# Patient Record
Sex: MALE | Race: WHITE | Employment: OTHER | URBAN - METROPOLITAN AREA
[De-identification: names, ages, dates, MRNs, and addresses within clinical notes are randomized per-mention and may not be internally consistent; named-entity substitution may affect disease eponyms.]

---

## 2017-05-01 PROBLEM — Z91.199 NONCOMPLIANCE: Status: ACTIVE | Noted: 2017-05-01

## 2017-05-01 PROBLEM — I10 ESSENTIAL HYPERTENSION: Status: ACTIVE | Noted: 2017-05-01

## 2017-12-05 PROBLEM — E66.01 MORBID OBESITY (HCC): Status: ACTIVE | Noted: 2017-12-05

## 2017-12-13 PROBLEM — E66.9 OBESITY (BMI 35.0-39.9 WITHOUT COMORBIDITY): Status: ACTIVE | Noted: 2017-12-13

## 2019-04-22 ENCOUNTER — HOSPITAL ENCOUNTER (OUTPATIENT)
Dept: GENERAL RADIOLOGY | Age: 64
Discharge: HOME | End: 2019-04-22
Payer: MEDICARE

## 2019-04-22 DIAGNOSIS — G89.29 CHRONIC BILATERAL LOW BACK PAIN WITH SCIATICA, SCIATICA LATERALITY UNSPECIFIED: ICD-10-CM

## 2019-04-22 DIAGNOSIS — M54.40 CHRONIC BILATERAL LOW BACK PAIN WITH SCIATICA, SCIATICA LATERALITY UNSPECIFIED: ICD-10-CM

## 2019-04-22 PROCEDURE — 72100 X-RAY EXAM L-S SPINE 2/3 VWS: CPT

## 2019-04-22 NOTE — PROGRESS NOTES
He has very severe arthritis in the lower back.   Make sure he gets an appointment with Dr. Michelle Damon at [facility] and I would like to see him in 2 weeks

## 2020-01-28 PROBLEM — D75.1 ERYTHROCYTOSIS: Status: ACTIVE | Noted: 2020-01-28

## 2020-02-25 ENCOUNTER — HOSPITAL ENCOUNTER (OUTPATIENT)
Dept: LAB | Age: 65
Discharge: HOME | End: 2020-02-25
Payer: MEDICARE

## 2020-02-25 DIAGNOSIS — D58.2 ELEVATED HEMOGLOBIN (HCC): ICD-10-CM

## 2020-02-25 LAB
ALBUMIN SERPL-MCNC: 3.5 G/DL (ref 3.2–4.6)
ALBUMIN/GLOB SERPL: 0.8 {RATIO} (ref 1.2–3.5)
ALP SERPL-CCNC: 68 U/L (ref 50–136)
ALT SERPL-CCNC: 30 U/L (ref 12–65)
ANION GAP SERPL CALC-SCNC: 4 MMOL/L (ref 7–16)
AST SERPL-CCNC: 26 U/L (ref 15–37)
BASOPHILS # BLD: 0 K/UL (ref 0–0.2)
BASOPHILS NFR BLD: 0 % (ref 0–2)
BILIRUB SERPL-MCNC: 0.4 MG/DL (ref 0.2–1.1)
BUN SERPL-MCNC: 22 MG/DL (ref 8–23)
CALCIUM SERPL-MCNC: 10.3 MG/DL (ref 8.3–10.4)
CHLORIDE SERPL-SCNC: 106 MMOL/L (ref 98–107)
CO2 SERPL-SCNC: 29 MMOL/L (ref 21–32)
CREAT SERPL-MCNC: 1.21 MG/DL (ref 0.8–1.5)
DIFFERENTIAL METHOD BLD: ABNORMAL
EOSINOPHIL # BLD: 0.3 K/UL (ref 0–0.8)
EOSINOPHIL NFR BLD: 4 % (ref 0.5–7.8)
ERYTHROCYTE [DISTWIDTH] IN BLOOD BY AUTOMATED COUNT: 13 % (ref 11.9–14.6)
GLOBULIN SER CALC-MCNC: 4.5 G/DL (ref 2.3–3.5)
GLUCOSE SERPL-MCNC: 97 MG/DL (ref 65–100)
HCT VFR BLD AUTO: 52 % (ref 41.1–50.3)
HGB BLD-MCNC: 17.1 G/DL (ref 13.6–17.2)
IMM GRANULOCYTES # BLD AUTO: 0 K/UL (ref 0–0.5)
IMM GRANULOCYTES NFR BLD AUTO: 0 % (ref 0–5)
LYMPHOCYTES # BLD: 2.2 K/UL (ref 0.5–4.6)
LYMPHOCYTES NFR BLD: 30 % (ref 13–44)
MCH RBC QN AUTO: 30.2 PG (ref 26.1–32.9)
MCHC RBC AUTO-ENTMCNC: 32.9 G/DL (ref 31.4–35)
MCV RBC AUTO: 91.9 FL (ref 79.6–97.8)
MONOCYTES # BLD: 0.7 K/UL (ref 0.1–1.3)
MONOCYTES NFR BLD: 9 % (ref 4–12)
NEUTS SEG # BLD: 4.2 K/UL (ref 1.7–8.2)
NEUTS SEG NFR BLD: 56 % (ref 43–78)
NRBC # BLD: 0 K/UL (ref 0–0.2)
PLATELET # BLD AUTO: 185 K/UL (ref 150–450)
PMV BLD AUTO: 9.3 FL (ref 9.4–12.3)
POTASSIUM SERPL-SCNC: 5.8 MMOL/L (ref 3.5–5.1)
PROT SERPL-MCNC: 8 G/DL (ref 6.3–8.2)
RBC # BLD AUTO: 5.66 M/UL (ref 4.23–5.67)
SODIUM SERPL-SCNC: 139 MMOL/L (ref 136–145)
WBC # BLD AUTO: 7.5 K/UL (ref 4.3–11.1)

## 2020-02-25 PROCEDURE — 85025 COMPLETE CBC W/AUTO DIFF WBC: CPT

## 2020-02-25 PROCEDURE — 80053 COMPREHEN METABOLIC PANEL: CPT

## 2020-02-25 PROCEDURE — 84403 ASSAY OF TOTAL TESTOSTERONE: CPT

## 2020-02-25 PROCEDURE — 36415 COLL VENOUS BLD VENIPUNCTURE: CPT

## 2020-02-27 LAB
TESTOST FREE SERPL-MCNC: 1.3 PG/ML (ref 6.6–18.1)
TESTOST SERPL-MCNC: <3 NG/DL (ref 264–916)

## 2021-06-11 PROBLEM — I73.9 PAD (PERIPHERAL ARTERY DISEASE) (HCC): Status: ACTIVE | Noted: 2021-06-11

## 2021-11-08 PROBLEM — I65.23 ATHEROSCLEROSIS OF BOTH CAROTID ARTERIES: Status: ACTIVE | Noted: 2021-11-08

## 2022-03-18 PROBLEM — I73.9 PAD (PERIPHERAL ARTERY DISEASE) (HCC): Status: ACTIVE | Noted: 2021-06-11

## 2022-03-18 PROBLEM — I65.23 ATHEROSCLEROSIS OF BOTH CAROTID ARTERIES: Status: ACTIVE | Noted: 2021-11-08

## 2022-03-18 PROBLEM — E66.9 OBESITY (BMI 35.0-39.9 WITHOUT COMORBIDITY): Status: ACTIVE | Noted: 2017-12-13

## 2022-03-18 PROBLEM — I10 ESSENTIAL HYPERTENSION: Status: ACTIVE | Noted: 2017-05-01

## 2022-03-18 PROBLEM — Z91.199 NONCOMPLIANCE: Status: ACTIVE | Noted: 2017-05-01

## 2022-03-19 PROBLEM — E66.01 MORBID OBESITY (HCC): Status: ACTIVE | Noted: 2017-12-05

## 2022-03-19 PROBLEM — D75.1 ERYTHROCYTOSIS: Status: ACTIVE | Noted: 2020-01-28

## 2022-06-13 ENCOUNTER — NURSE ONLY (OUTPATIENT)
Dept: UROLOGY | Age: 67
End: 2022-06-13
Payer: MEDICARE

## 2022-06-13 DIAGNOSIS — E29.1 HYPOGONADISM IN MALE: Primary | ICD-10-CM

## 2022-06-13 PROCEDURE — 96372 THER/PROPH/DIAG INJ SC/IM: CPT | Performed by: UROLOGY

## 2022-06-13 RX ORDER — TESTOSTERONE CYPIONATE 200 MG/ML
100 INJECTION INTRAMUSCULAR ONCE
Status: COMPLETED | OUTPATIENT
Start: 2022-06-13 | End: 2022-06-13

## 2022-06-13 RX ADMIN — TESTOSTERONE CYPIONATE 100 MG: 200 INJECTION INTRAMUSCULAR at 10:23

## 2022-06-13 NOTE — PROGRESS NOTES
Per 's note from 08/18/2021, and his verbal consent as in office clinician, patient due to receive 100mg (0.5cc) testosterone injection every 2 weeks. Patient received injection Rangel Engel today without incident. Testosterone injections authorized by insurance in schedule note by Ayse Donnelly.  Per that note: No PA Req / per [employer]ail[employer] / Rozanna Cornea

## 2022-06-27 ENCOUNTER — NURSE ONLY (OUTPATIENT)
Dept: UROLOGY | Age: 67
End: 2022-06-27
Payer: MEDICARE

## 2022-06-27 DIAGNOSIS — E29.1 HYPOGONADISM IN MALE: Primary | ICD-10-CM

## 2022-06-27 PROCEDURE — 96372 THER/PROPH/DIAG INJ SC/IM: CPT | Performed by: UROLOGY

## 2022-06-27 RX ORDER — TESTOSTERONE CYPIONATE 200 MG/ML
100 INJECTION INTRAMUSCULAR ONCE
Status: COMPLETED | OUTPATIENT
Start: 2022-06-27 | End: 2022-06-27

## 2022-06-27 RX ADMIN — TESTOSTERONE CYPIONATE 100 MG: 200 INJECTION INTRAMUSCULAR at 11:35

## 2022-06-27 NOTE — PROGRESS NOTES
Per 's note from 08/18/2021, and his verbal consent as in office clinician, patient due to receive 100mg (0.5cc) testosterone injection every 2 weeks. Patient received injection LUOQ today without incident. Testosterone injections authorized by insurance in schedule note by Trine Lefort.  Per that note: No PA Req / per [employer] / Aileen Bradley

## 2022-07-01 ENCOUNTER — TELEPHONE (OUTPATIENT)
Dept: ORTHOPEDIC SURGERY | Age: 67
End: 2022-07-01

## 2022-07-11 ENCOUNTER — NURSE ONLY (OUTPATIENT)
Dept: UROLOGY | Age: 67
End: 2022-07-11
Payer: MEDICARE

## 2022-07-11 DIAGNOSIS — E29.1 HYPOGONADISM IN MALE: Primary | ICD-10-CM

## 2022-07-11 PROCEDURE — 96372 THER/PROPH/DIAG INJ SC/IM: CPT | Performed by: UROLOGY

## 2022-07-11 RX ORDER — TESTOSTERONE CYPIONATE 200 MG/ML
100 INJECTION INTRAMUSCULAR ONCE
Status: COMPLETED | OUTPATIENT
Start: 2022-07-11 | End: 2022-07-11

## 2022-07-11 RX ADMIN — TESTOSTERONE CYPIONATE 100 MG: 200 INJECTION INTRAMUSCULAR at 10:47

## 2022-07-11 NOTE — PROGRESS NOTES
Per 's note from 08/18/2021, and 's verbal consent as in office clinician, patient due to receive 100mg (0.5cc) testosterone injection every 2 weeks. Patient received injection Natividad Robin today without incident. Testosterone injections authorized by insurance in schedule note by Reggie Mcdonald.  Per that note: No EBENEZER Agudelo / per Availchristiano / Bruce Gonzales

## 2022-07-13 ENCOUNTER — TELEPHONE (OUTPATIENT)
Dept: ORTHOPEDIC SURGERY | Age: 67
End: 2022-07-13

## 2022-07-13 DIAGNOSIS — M54.17 LUMBOSACRAL RADICULOPATHY: ICD-10-CM

## 2022-07-13 DIAGNOSIS — M47.816 LUMBAR SPONDYLOSIS: Primary | ICD-10-CM

## 2022-07-13 NOTE — LETTER
[facility] [address] [address]  Phone: [phone]  Fax: [phone]    Vick Cifuentes MD        2022      Lumbar Spine Injection Precert Authorization Form    Name: Danyel Lorenzo  : 1955  Age: 75 y.o. Gender: male    Clinical Information    Referring Doctor: Josiah Adams MD  Diagnosis:     ICD-10-CM    1. Lumbar spondylosis  M47.816    2. Lumbosacral radiculopathy  M54.17    . Body Part: lumbar spine    Type of Service    [ ] 74410+- EMMANUEL Caudal or Lumbar    [ ] 58316- Facet Lumbar (single Level)    [ ] 51993- Facet 2nd Level    [ ] 13480- Facet 3 or more level    [ ] 91141- Sacroiliac joint     [ ] 95875- SNRB Transforaminal EMMANUEL Lumbar or Sacral (single level)    [ ] 31149- SNRB add levels Lumbar or Sacral     [ ] 64806- Sciatic Nerve, single.      [ ] 56712- Radiofrequency L/S single facet     [ ] 45187- Radiofrequency L/s additional facet       Comments  Left L4 SNRB    Insurance:        Sincerely,        Vick Cifuentes MD

## 2022-07-14 NOTE — TELEPHONE ENCOUNTER
Patient scheduled for repeat injections with Dr. Parada Look 7/21 GR 3:00p
Pt called to schedule back injection
Afib    BPH (benign prostatic hyperplasia)    Diabetes    Diabetes mellitus with no complication    History of non-ST elevation myocardial infarction (NSTEMI)    Hypertension    Osteomyelitis  s/p debridement  Perforated gastric ulcer  s/p emergent ex-lap omentopexy and plication 6/2019  Pulmonary embolism

## 2022-07-21 ENCOUNTER — OFFICE VISIT (OUTPATIENT)
Dept: ORTHOPEDIC SURGERY | Age: 67
End: 2022-07-21
Payer: MEDICARE

## 2022-07-21 DIAGNOSIS — M54.17 LUMBOSACRAL RADICULOPATHY: Primary | ICD-10-CM

## 2022-07-21 DIAGNOSIS — M54.50 LOW BACK PAIN, UNSPECIFIED BACK PAIN LATERALITY, UNSPECIFIED CHRONICITY, UNSPECIFIED WHETHER SCIATICA PRESENT: ICD-10-CM

## 2022-07-21 PROCEDURE — 64483 NJX AA&/STRD TFRM EPI L/S 1: CPT | Performed by: PHYSICAL MEDICINE & REHABILITATION

## 2022-07-21 PROCEDURE — 20552 NJX 1/MLT TRIGGER POINT 1/2: CPT | Performed by: PHYSICAL MEDICINE & REHABILITATION

## 2022-07-21 RX ORDER — TRIAMCINOLONE ACETONIDE 40 MG/ML
160 INJECTION, SUSPENSION INTRA-ARTICULAR; INTRAMUSCULAR ONCE
Status: COMPLETED | OUTPATIENT
Start: 2022-07-21 | End: 2022-07-21

## 2022-07-21 RX ADMIN — TRIAMCINOLONE ACETONIDE 160 MG: 40 INJECTION, SUSPENSION INTRA-ARTICULAR; INTRAMUSCULAR at 15:14

## 2022-07-21 NOTE — PROGRESS NOTES
Date: 07/21/22   Name: Abel Brooke    Pre-Procedural Diagnosis:    Diagnosis Orders   1. Lumbosacral radiculopathy  FL NERVE BLOCK LUMBOSACRAL 1ST    triamcinolone acetonide (KENALOG-40) injection 160 mg      2. Low back pain, unspecified back pain laterality, unspecified chronicity, unspecified whether sciatica present  INJECT TRIGGER POINT, 1 OR 2    triamcinolone acetonide (KENALOG-40) injection 160 mg          Procedure: Selective Nerve Root Blocks (Transforaminal) - Single Level with lumbar trigger point injections    Precautions: [facility] Precautions spine injections: None. Patient denies any prior sensitivity to steroid, local anesthetic, contrast dye, iodine or shellfish. The procedure was discussed at length with the patient and informed consent was signed. The patient was placed in a prone position on the fluoroscopy table and the skin was prepped and draped in a routine sterile fashion. The areas to be injected was/were anesthetized with approximately 5 cc of 1% Lidocaine. A 22-gauge 3.5 inch inch spinal needle was carefully advanced under fluoroscopic guidance to the left L4 transforaminal space(s). At this time 0.25 cc of omnipaque administered. Once proper placement was confirmed, 2 cc of 0.25% Marcaine and 80 mg of Kenalog were injected through the spinal needle at that/each site. After informed oral consent, patient was prepped per routine. The left lower lumbar paraspinal area(s) was injected. 80 mg of Kenalog with 1 cc of 0.25% Marcaine injected at each site. Patient tolerated well. Band aid(s) applied. Fluoroscopic guidance was used intermittently over a 10-minute period to insure proper needle placement and patient safety. A hard copy of the fluoroscopic  images has been placed in the patient's chart. The patient was monitored after the procedure and discharged home in stable fashion.      Resume Meds:  N/A  Kirstin Kelly MD  07/21/22

## 2022-07-25 ENCOUNTER — NURSE ONLY (OUTPATIENT)
Dept: UROLOGY | Age: 67
End: 2022-07-25
Payer: MEDICARE

## 2022-07-25 DIAGNOSIS — E29.1 HYPOGONADISM IN MALE: Primary | ICD-10-CM

## 2022-07-25 PROCEDURE — 96372 THER/PROPH/DIAG INJ SC/IM: CPT | Performed by: UROLOGY

## 2022-07-25 RX ORDER — TESTOSTERONE CYPIONATE 200 MG/ML
100 INJECTION INTRAMUSCULAR ONCE
Status: COMPLETED | OUTPATIENT
Start: 2022-07-25 | End: 2022-07-25

## 2022-07-25 RX ADMIN — TESTOSTERONE CYPIONATE 100 MG: 200 INJECTION INTRAMUSCULAR at 11:39

## 2022-07-25 NOTE — PROGRESS NOTES
Per 's note from 08/18/2021, and 's verbal consent as in office clinician, patient due to receive 100mg (0.5cc) testosterone injection every 2 weeks. Patient received injection Jc Shah today without incident. Testosterone injections authorized by insurance in schedule note by Bennett New.  Per that note: No PA Req / per [employer]aility / Néstor Weiner

## 2022-08-10 ENCOUNTER — NURSE ONLY (OUTPATIENT)
Dept: UROLOGY | Age: 67
End: 2022-08-10

## 2022-08-10 DIAGNOSIS — N13.8 BENIGN PROSTATIC HYPERPLASIA WITH URINARY OBSTRUCTION: ICD-10-CM

## 2022-08-10 DIAGNOSIS — N40.1 BENIGN PROSTATIC HYPERPLASIA WITH URINARY OBSTRUCTION: ICD-10-CM

## 2022-08-10 DIAGNOSIS — N13.8 BENIGN PROSTATIC HYPERPLASIA WITH URINARY OBSTRUCTION: Primary | ICD-10-CM

## 2022-08-10 DIAGNOSIS — N40.1 BENIGN PROSTATIC HYPERPLASIA WITH URINARY OBSTRUCTION: Primary | ICD-10-CM

## 2022-08-10 LAB — PSA SERPL-MCNC: 0.7 NG/ML

## 2022-08-17 ENCOUNTER — OFFICE VISIT (OUTPATIENT)
Dept: UROLOGY | Age: 67
End: 2022-08-17
Payer: MEDICARE

## 2022-08-17 DIAGNOSIS — N20.0 KIDNEY STONE: ICD-10-CM

## 2022-08-17 DIAGNOSIS — N13.8 BENIGN PROSTATIC HYPERPLASIA WITH URINARY OBSTRUCTION: ICD-10-CM

## 2022-08-17 DIAGNOSIS — E29.1 HYPOGONADISM IN MALE: Primary | ICD-10-CM

## 2022-08-17 DIAGNOSIS — N40.1 BENIGN PROSTATIC HYPERPLASIA WITH URINARY OBSTRUCTION: ICD-10-CM

## 2022-08-17 LAB
ALBUMIN SERPL-MCNC: 3.6 G/DL (ref 3.2–4.6)
ALBUMIN/GLOB SERPL: 0.9 {RATIO} (ref 1.2–3.5)
ALP SERPL-CCNC: 80 U/L (ref 50–136)
ALT SERPL-CCNC: 27 U/L (ref 12–65)
AST SERPL-CCNC: 26 U/L (ref 15–37)
BASOPHILS # BLD: 0 K/UL (ref 0–0.2)
BASOPHILS NFR BLD: 1 % (ref 0–2)
BILIRUB DIRECT SERPL-MCNC: 0.2 MG/DL
BILIRUB SERPL-MCNC: 0.5 MG/DL (ref 0.2–1.1)
BILIRUBIN, URINE, POC: NEGATIVE
BLOOD URINE, POC: NORMAL
DIFFERENTIAL METHOD BLD: ABNORMAL
EOSINOPHIL # BLD: 0.2 K/UL (ref 0–0.8)
EOSINOPHIL NFR BLD: 2 % (ref 0.5–7.8)
ERYTHROCYTE [DISTWIDTH] IN BLOOD BY AUTOMATED COUNT: 14 % (ref 11.9–14.6)
GLOBULIN SER CALC-MCNC: 3.8 G/DL (ref 2.3–3.5)
GLUCOSE URINE, POC: NEGATIVE
HCT VFR BLD AUTO: 55.1 % (ref 41.1–50.3)
HGB BLD-MCNC: 18.2 G/DL (ref 13.6–17.2)
IMM GRANULOCYTES # BLD AUTO: 0 K/UL (ref 0–0.5)
IMM GRANULOCYTES NFR BLD AUTO: 1 % (ref 0–5)
KETONES, URINE, POC: NEGATIVE
LEUKOCYTE ESTERASE, URINE, POC: NEGATIVE
LYMPHOCYTES # BLD: 2 K/UL (ref 0.5–4.6)
LYMPHOCYTES NFR BLD: 24 % (ref 13–44)
MCH RBC QN AUTO: 31.8 PG (ref 26.1–32.9)
MCHC RBC AUTO-ENTMCNC: 33 G/DL (ref 31.4–35)
MCV RBC AUTO: 96.3 FL (ref 79.6–97.8)
MONOCYTES # BLD: 0.6 K/UL (ref 0.1–1.3)
MONOCYTES NFR BLD: 7 % (ref 4–12)
NEUTS SEG # BLD: 5.7 K/UL (ref 1.7–8.2)
NEUTS SEG NFR BLD: 65 % (ref 43–78)
NITRITE, URINE, POC: NEGATIVE
NRBC # BLD: 0 K/UL (ref 0–0.2)
PH, URINE, POC: 6 (ref 4.6–8)
PLATELET # BLD AUTO: 193 K/UL (ref 150–450)
PMV BLD AUTO: 10.2 FL (ref 9.4–12.3)
PROT SERPL-MCNC: 7.4 G/DL (ref 6.3–8.2)
PROTEIN,URINE, POC: NEGATIVE
RBC # BLD AUTO: 5.72 M/UL (ref 4.23–5.6)
SPECIFIC GRAVITY, URINE, POC: 1.02 (ref 1–1.03)
URINALYSIS CLARITY, POC: NORMAL
URINALYSIS COLOR, POC: NORMAL
UROBILINOGEN, POC: NORMAL
WBC # BLD AUTO: 8.6 K/UL (ref 4.3–11.1)

## 2022-08-17 PROCEDURE — 81003 URINALYSIS AUTO W/O SCOPE: CPT | Performed by: UROLOGY

## 2022-08-17 PROCEDURE — 1123F ACP DISCUSS/DSCN MKR DOCD: CPT | Performed by: UROLOGY

## 2022-08-17 PROCEDURE — G8427 DOCREV CUR MEDS BY ELIG CLIN: HCPCS | Performed by: UROLOGY

## 2022-08-17 PROCEDURE — G8417 CALC BMI ABV UP PARAM F/U: HCPCS | Performed by: UROLOGY

## 2022-08-17 PROCEDURE — 4004F PT TOBACCO SCREEN RCVD TLK: CPT | Performed by: UROLOGY

## 2022-08-17 PROCEDURE — 3017F COLORECTAL CA SCREEN DOC REV: CPT | Performed by: UROLOGY

## 2022-08-17 PROCEDURE — 99214 OFFICE O/P EST MOD 30 MIN: CPT | Performed by: UROLOGY

## 2022-08-17 RX ORDER — TESTOSTERONE CYPIONATE 200 MG/ML
100 VIAL (ML) INTRAMUSCULAR
Qty: 5 ML | Refills: 5 | Status: SHIPPED | OUTPATIENT
Start: 2022-08-17

## 2022-08-17 ASSESSMENT — ENCOUNTER SYMPTOMS
BLOOD IN STOOL: 0
EYE PAIN: 0
EYE DISCHARGE: 0
DIARRHEA: 0
WHEEZING: 0
NAUSEA: 0
SHORTNESS OF BREATH: 0
HEARTBURN: 0
SKIN LESIONS: 0
CONSTIPATION: 0
BACK PAIN: 0
INDIGESTION: 0
ABDOMINAL PAIN: 0
VOMITING: 0
COUGH: 0

## 2022-08-17 NOTE — PROGRESS NOTES
[facility]  [address] [facility] [address], [address] [facility]  [phone]          Subha Holland  : 1955    Chief Complaint   Patient presents with    Follow-up          HPI     Subha Holland is a 75 y.o.  male with symptomatic hypogonadism who presents to clinic for follow up. He previously followed with NP Krissy Stephenson and was on testosterone 200 mg IM q10 days. His last level on 20 was > 1500 with a Hb 18. 3. He was sent to Dr. Lucita Taylor with Hematology who agreed that it was likely due to his very elevated T levels. TRT was stopped and levels normalized. He was then restarted on 200 mg IM TRT q14 days and gets his injections at our office. Hct was again elevated and dose decreased to 100 mg q14 days. Total testosterone and Hct due today. Denies significant urgency, frequency, urinary retention, hematuria, urinary incontinence or other symptoms/concerns. No bothersome fatigue/low libido. States he feels great over all. Of note, he does report ED and is not on any medication at this time for it. Does not want ED medication today. He also reports passing a kidney stone a few weeks back without difficulty.      Lab Results   Component Value Date    PSA 0.7 08/10/2022    PSA 1.2 2021    PSA 0.7 2021           Past Medical History:   Diagnosis Date    Arthritis     Asthma     Calculus of kidney     Hypercholesterolemia     Hypertension      Past Surgical History:   Procedure Laterality Date    HEENT Bilateral     cataracts    ORTHOPEDIC SURGERY      rotator cuff ten years ago     Current Outpatient Medications   Medication Sig Dispense Refill    Testosterone Cypionate 200 MG/ML SOLN Inject 100 mg as directed every 14 days 5 mL 5    aspirin 81 MG EC tablet Take 81 mg by mouth daily      atorvastatin (LIPITOR) 80 MG tablet TAKE 1 TABLET EVERY NIGHT      diazePAM (VALIUM) 5 MG tablet Take 5 mg by mouth.      ezetimibe (ZETIA) 10 MG tablet Take 10 mg by mouth daily      lisinopril (PRINIVIL;ZESTRIL) 20 MG tablet TAKE 1 TABLET EVERY DAY      naproxen sodium (ANAPROX) 220 MG tablet Take 220 mg by mouth daily      rivaroxaban (XARELTO) 2.5 MG TABS tablet Take 2.5 mg by mouth 2 times daily (with meals)      traMADol (ULTRAM) 50 MG tablet Take 50 mg by mouth every 6 hours as needed. No current facility-administered medications for this visit. Allergies   Allergen Reactions    Methocarbamol Hives    Ciprofloxacin Nausea And Vomiting     Social History     Socioeconomic History    Marital status:      Spouse name: Not on file    Number of children: Not on file    Years of education: Not on file    Highest education level: Not on file   Occupational History    Not on file   Tobacco Use    Smoking status: Some Days     Packs/day: 1.00     Types: Cigarettes     Last attempt to quit: 2013     Years since quittin.6    Smokeless tobacco: Never   Substance and Sexual Activity    Alcohol use: Yes     Alcohol/week: 0.0 standard drinks    Drug use: No    Sexual activity: Not on file   Other Topics Concern    Not on file   Social History Narrative    Not on file     Social Determinants of Health     Financial Resource Strain: Not on file   Food Insecurity: Not on file   Transportation Needs: Not on file   Physical Activity: Not on file   Stress: Not on file   Social Connections: Not on file   Intimate Partner Violence: Not on file   Housing Stability: Not on file     Family History   Problem Relation Age of Onset    Asthma Mother     Thyroid Disease Mother     COPD Mother     Heart Disease Father     Hypertension Father        Review of Systems  Constitutional:   Negative for fever, chills, appetite change, malaise/fatigue, headaches and weight loss. Skin:  Negative for skin lesions, rash and itching. Eyes:  Negative for visual disturbance, eye pain and eye discharge.   ENT:  Negative for difficulty articulating words, pain swallowing, high frequency hearing loss and dry mouth. Respiratory:  Negative for cough, blood in sputum, shortness of breath and wheezing. Cardiovascular:  Negative for chest pain, hypertension, irregular heartbeat, leg pain, leg swelling, regular rate and rhythm and varicose veins. GI:  Negative for nausea, vomiting, abdominal pain, blood in stool, constipation, diarrhea, indigestion and heartburn. Genitourinary:  Negative for urinary burning, hematuria, flank pain, recurrent UTIs, history of urolithiasis, nocturia, slower stream, straining, urgency, leakage w/ urge, frequent urination, incomplete emptying, erectile dysfunction, testicular pain, sexually transmitted disease, discharge and urethral stricture. Musculoskeletal:  Negative for back pain, bone pain, arthralgias, tenderness, muscle weakness and neck pain. Neurological:  Negative for dizziness, focal weakness, numbness, seizures and tremors. Psychological:  Negative for depression and psychiatric problem. Endocrine:  Negative for cold intolerance, thirst, excessive urination, fatigue and heat intolerance. Hem/Lymphatic:  Negative for easy bleeding, easy bruising and frequent infections. Urinalysis  UA - Dipstick  Results for orders placed or performed in visit on 08/17/22   AMB POC URINALYSIS DIP STICK AUTO W/O MICRO   Result Value Ref Range    Color (UA POC)      Clarity (UA POC)      Glucose, Urine, POC Negative Negative    Bilirubin, Urine, POC Negative Negative    KETONES, Urine, POC Negative Negative    Specific Gravity, Urine, POC 1.020 1.001 - 1.035    Blood (UA POC) Trace Negative    pH, Urine, POC 6.0 4.6 - 8.0    Protein, Urine, POC Negative Negative    Urobilinogen, POC 0.2 mg/dL     Nitrite, Urine, POC Negative Negative    Leukocyte Esterase, Urine, POC Negative Negative           Assessment and Plan    ICD-10-CM    1.  Hypogonadism in male  E29.1 AMB POC URINALYSIS DIP STICK AUTO W/O MICRO     Testosterone, free, total     Testosterone, free, total Hepatic Function Panel     CBC with Auto Differential     Testosterone Cypionate 200 MG/ML SOLN     Testosterone, free, total     CANCELED: CBC with Auto Differential     CANCELED: Hepatic Function Panel     CANCELED: Hepatic Function Panel     CANCELED: CBC with Auto Differential      2. Benign prostatic hyperplasia with urinary obstruction  N40.1 AMB POC URINALYSIS DIP STICK AUTO W/O MICRO    N13.8 PSA, Diagnostic        Hypogonadism:   Will get CBC. LFTs and T level today and call with results. Otherwise, will continue TRT 6 months 100 mg [id number]. This is working well for him. BPH/LUTS:   Will recheck PSA in 1 year. Doing well without bothersome LUTS. Will continue to monitor. Kidney stone:   Passed on own. No symptoms of stone today. I have spent 30 minutes today reviewing previous notes, test results and face to face with the patient as well as documenting. Juancarlos Apodaca M.D.     [facility]  [place] [address] [address] [facility]  Phone: [phone]  Fax: [phone]

## 2022-08-19 ENCOUNTER — TELEPHONE (OUTPATIENT)
Dept: UROLOGY | Age: 67
End: 2022-08-19

## 2022-08-19 NOTE — TELEPHONE ENCOUNTER
----- Message from Lenette Schirmer, MD sent at 8/18/2022  1:01 PM EDT -----  Castaneda Cons,    Please Call Mr. Meliton Sinha to let him know that his Hemoglobin is high again. I would recommend that he stop his testosterone and donate 1 units of blood as well as increase his hydration. Please then schedule him repeat CBC in 1 month after he has donated blood. He should continue to hold testosterone until that recheck CBC is completed. Thanks!   [place]

## 2022-08-20 LAB
TESTOST FREE SERPL-MCNC: 0.8 PG/ML (ref 6.6–18.1)
TESTOST SERPL-MCNC: 108 NG/DL (ref 264–916)

## 2022-10-03 ENCOUNTER — OFFICE VISIT (OUTPATIENT)
Dept: ORTHOPEDIC SURGERY | Age: 67
End: 2022-10-03
Payer: MEDICARE

## 2022-10-03 DIAGNOSIS — M54.50 LOW BACK PAIN, UNSPECIFIED BACK PAIN LATERALITY, UNSPECIFIED CHRONICITY, UNSPECIFIED WHETHER SCIATICA PRESENT: Primary | ICD-10-CM

## 2022-10-03 DIAGNOSIS — R29.898 LEG WEAKNESS, BILATERAL: ICD-10-CM

## 2022-10-03 DIAGNOSIS — M48.061 SPINAL STENOSIS OF LUMBAR REGION WITHOUT NEUROGENIC CLAUDICATION: ICD-10-CM

## 2022-10-03 PROCEDURE — 3017F COLORECTAL CA SCREEN DOC REV: CPT | Performed by: PHYSICAL MEDICINE & REHABILITATION

## 2022-10-03 PROCEDURE — G8417 CALC BMI ABV UP PARAM F/U: HCPCS | Performed by: PHYSICAL MEDICINE & REHABILITATION

## 2022-10-03 PROCEDURE — 99213 OFFICE O/P EST LOW 20 MIN: CPT | Performed by: PHYSICAL MEDICINE & REHABILITATION

## 2022-10-03 PROCEDURE — 4004F PT TOBACCO SCREEN RCVD TLK: CPT | Performed by: PHYSICAL MEDICINE & REHABILITATION

## 2022-10-03 PROCEDURE — 20552 NJX 1/MLT TRIGGER POINT 1/2: CPT | Performed by: PHYSICAL MEDICINE & REHABILITATION

## 2022-10-03 PROCEDURE — 1123F ACP DISCUSS/DSCN MKR DOCD: CPT | Performed by: PHYSICAL MEDICINE & REHABILITATION

## 2022-10-03 PROCEDURE — G8484 FLU IMMUNIZE NO ADMIN: HCPCS | Performed by: PHYSICAL MEDICINE & REHABILITATION

## 2022-10-03 PROCEDURE — G8428 CUR MEDS NOT DOCUMENT: HCPCS | Performed by: PHYSICAL MEDICINE & REHABILITATION

## 2022-10-03 RX ORDER — TRIAMCINOLONE ACETONIDE 40 MG/ML
160 INJECTION, SUSPENSION INTRA-ARTICULAR; INTRAMUSCULAR ONCE
Status: COMPLETED | OUTPATIENT
Start: 2022-10-03 | End: 2022-10-03

## 2022-10-03 RX ORDER — TRAMADOL HYDROCHLORIDE 50 MG/1
50 TABLET ORAL DAILY PRN
Qty: 30 TABLET | Refills: 2 | Status: SHIPPED | OUTPATIENT
Start: 2022-10-03 | End: 2022-11-02

## 2022-10-03 RX ADMIN — TRIAMCINOLONE ACETONIDE 160 MG: 40 INJECTION, SUSPENSION INTRA-ARTICULAR; INTRAMUSCULAR at 16:08

## 2022-10-03 NOTE — PROGRESS NOTES
Date:  10/03/22      Diagnosis Orders   1. Low back pain, unspecified back pain laterality, unspecified chronicity, unspecified whether sciatica present  INJECT TRIGGER POINT, 1 OR 2    triamcinolone acetonide (KENALOG-40) injection 160 mg    MRI LUMBAR SPINE WO CONTRAST      2. Leg weakness, bilateral  MRI LUMBAR SPINE WO CONTRAST      3. Spinal stenosis of lumbar region without neurogenic claudication            HPI:  I am seeing John Blandon in evalution/folowup. I last saw him in the office setting just over a year ago. At that time he was having pain in the lower lumbar paraspinal areas but also some weakness in the legs with ambulation. He was evaluated by Dr. Tristen Duran and was felt that spine surgery could be more of a last resort. A translaminar lumbar EMMANUEL worked pretty well then repeat injection was performed there was some difficulty placing the needle into the epidural spaces. At that time trigger point injections as well as a left L4 selective nerve root block offered some benefit, 5% pain reduction for up to 6 months. This injection was performed in January of this year as well as most recently 2 and half months ago. The latter injection offered a 75% pain reduction for over 2 months. The pain is come back in a similar fashion though not with his much gravitation of symptoms into the left leg. He still has some trouble with ambulation, tells me he can walk about 40 or 50 feet starts having some difficulties, though it does vary. Last year we felt it was around 150 feet before difficulties. He tells me he definitely feels that he has been progressively getting worse. Takes tramadol sparingly, no side effects. The most recent MRI of the lumbar spine was from around 3 years ago that revealed moderate spinal stenosis centrally at several levels. This did show some progression from prior MRI.     Did have an episode a few weeks back where he said he was sitting his truck and and felt that his right leg was little bit weaker. It got better. There were no other associated features. He thought there might be a problem with the right arm but he was not sure there was nothing else associated. There is no shortness of breath, slurred speech, etc.    ROS: As per previous notes    PE: Certain cooperative. He has reasonably good strength in lower extremities. Some weakness of dorsiflexion on the left, noting he has at least 2 inches of a built up heel say for leg length discrepancy. He has depressed reflexes. He has tenderness in lower lumbar paraspinal areas. Good range of motion in the hips. Assessment/Plan:  PREDOMINANTLY MUSCULOSKELETAL LUMBOSACRAL SPINE PAIN the context of significant spinal stenosis, there apparently has been some progression of gait abnormality. Fortunately his radicular symptoms are doing pretty well, responded to the last injection. Does have pain across the lower lumbar paraspinal areas. I would advocate a repeat MRI of the lumbar spine. Trigger point injections into the lower lumbar paraspinal areas today. I will give him a refill on his tramadol, noting he takes it sparingly. I will see him back in 6 months. The spell he had few weeks ago was very nonspecific, mostly right leg in which she does have weakness on both sides. If he has an episode in which there is definite involvement of other areas on the right such as speech or arm that he would need to seek medical attention. Right now it is very nonspecific. Trigger Point Injection(s):  After informed oral consent, patient was prepped per routine. The bilateral lumbar paraspinal area(s) were injected. 80 mg of Kenalog with 1 cc of 0.25% Marcaine injected at that site. Patient tolerated well. Band aid(s) applied. A total of two muscles/sites injected today for trigger point charge.     Jim Ferreira MD

## 2022-10-10 ENCOUNTER — TELEPHONE (OUTPATIENT)
Dept: ORTHOPEDIC SURGERY | Age: 67
End: 2022-10-10

## 2022-10-10 DIAGNOSIS — M47.816 LUMBAR SPONDYLOSIS: ICD-10-CM

## 2022-10-10 DIAGNOSIS — M48.061 SPINAL STENOSIS OF LUMBAR REGION WITHOUT NEUROGENIC CLAUDICATION: ICD-10-CM

## 2022-10-10 DIAGNOSIS — M54.17 LUMBOSACRAL RADICULOPATHY: Primary | ICD-10-CM

## 2022-10-10 NOTE — TELEPHONE ENCOUNTER
MRI order faxed to [facility] due to insurance problems. Patient aware that MRI appointment at [facility] has been canceled.

## 2022-10-25 DIAGNOSIS — R56.9 SEIZURE (HCC): Primary | ICD-10-CM

## 2022-10-25 RX ORDER — DIAZEPAM 5 MG/1
TABLET ORAL
Qty: 30 TABLET | Refills: 3 | Status: SHIPPED | OUTPATIENT
Start: 2022-10-25 | End: 2022-11-25

## 2022-10-31 ENCOUNTER — NURSE ONLY (OUTPATIENT)
Dept: UROLOGY | Age: 67
End: 2022-10-31

## 2022-10-31 DIAGNOSIS — R79.9 ABNORMAL BLOOD CELL COUNT: Primary | ICD-10-CM

## 2022-10-31 LAB
BASOPHILS # BLD: 0 K/UL (ref 0–0.2)
BASOPHILS NFR BLD: 0 % (ref 0–2)
DIFFERENTIAL METHOD BLD: NORMAL
EOSINOPHIL # BLD: 0.1 K/UL (ref 0–0.8)
EOSINOPHIL NFR BLD: 1 % (ref 0.5–7.8)
ERYTHROCYTE [DISTWIDTH] IN BLOOD BY AUTOMATED COUNT: 14.1 % (ref 11.9–14.6)
HCT VFR BLD AUTO: 43.8 % (ref 41.1–50.3)
HGB BLD-MCNC: 14.9 G/DL (ref 13.6–17.2)
IMM GRANULOCYTES # BLD AUTO: 0.1 K/UL (ref 0–0.5)
IMM GRANULOCYTES NFR BLD AUTO: 1 % (ref 0–5)
LYMPHOCYTES # BLD: 2.2 K/UL (ref 0.5–4.6)
LYMPHOCYTES NFR BLD: 23 % (ref 13–44)
MCH RBC QN AUTO: 32 PG (ref 26.1–32.9)
MCHC RBC AUTO-ENTMCNC: 34 G/DL (ref 31.4–35)
MCV RBC AUTO: 94.2 FL (ref 82–102)
MONOCYTES # BLD: 0.7 K/UL (ref 0.1–1.3)
MONOCYTES NFR BLD: 7 % (ref 4–12)
NEUTS SEG # BLD: 6.5 K/UL (ref 1.7–8.2)
NEUTS SEG NFR BLD: 68 % (ref 43–78)
NRBC # BLD: 0 K/UL (ref 0–0.2)
PLATELET # BLD AUTO: 150 K/UL (ref 150–450)
PMV BLD AUTO: 10 FL (ref 9.4–12.3)
RBC # BLD AUTO: 4.65 M/UL (ref 4.23–5.6)
WBC # BLD AUTO: 9.6 K/UL (ref 4.3–11.1)

## 2022-11-07 ENCOUNTER — APPOINTMENT (OUTPATIENT)
Dept: CT IMAGING | Age: 67
DRG: 163 | End: 2022-11-07
Payer: MEDICARE

## 2022-11-07 ENCOUNTER — HOSPITAL ENCOUNTER (INPATIENT)
Age: 67
LOS: 4 days | Discharge: HOME | DRG: 163 | End: 2022-11-11
Attending: EMERGENCY MEDICINE | Admitting: INTERNAL MEDICINE
Payer: MEDICARE

## 2022-11-07 ENCOUNTER — NURSE ONLY (OUTPATIENT)
Dept: UROLOGY | Age: 67
End: 2022-11-07
Payer: MEDICARE

## 2022-11-07 ENCOUNTER — HOSPITAL ENCOUNTER (EMERGENCY)
Dept: GENERAL RADIOLOGY | Age: 67
Discharge: HOME | DRG: 163 | End: 2022-11-10
Payer: MEDICARE

## 2022-11-07 DIAGNOSIS — R06.02 SHORTNESS OF BREATH: ICD-10-CM

## 2022-11-07 DIAGNOSIS — I26.99 OTHER ACUTE PULMONARY EMBOLISM WITHOUT ACUTE COR PULMONALE (HCC): Primary | ICD-10-CM

## 2022-11-07 DIAGNOSIS — I26.99 ACUTE PULMONARY EMBOLISM, UNSPECIFIED PULMONARY EMBOLISM TYPE, UNSPECIFIED WHETHER ACUTE COR PULMONALE PRESENT (HCC): ICD-10-CM

## 2022-11-07 DIAGNOSIS — E29.1 HYPOGONADISM IN MALE: Primary | ICD-10-CM

## 2022-11-07 PROBLEM — I73.9 PAD (PERIPHERAL ARTERY DISEASE) (HCC): Status: ACTIVE | Noted: 2021-06-11

## 2022-11-07 PROBLEM — I65.23 ATHEROSCLEROSIS OF BOTH CAROTID ARTERIES: Status: ACTIVE | Noted: 2021-11-08

## 2022-11-07 PROBLEM — E66.9 OBESITY (BMI 35.0-39.9 WITHOUT COMORBIDITY): Status: ACTIVE | Noted: 2017-12-13

## 2022-11-07 PROBLEM — I10 ESSENTIAL HYPERTENSION: Status: ACTIVE | Noted: 2017-05-01

## 2022-11-07 LAB
ALBUMIN SERPL-MCNC: 3.3 G/DL (ref 3.2–4.6)
ALBUMIN/GLOB SERPL: 0.8 {RATIO} (ref 0.4–1.6)
ALP SERPL-CCNC: 73 U/L (ref 50–136)
ALT SERPL-CCNC: 35 U/L (ref 12–65)
ANION GAP SERPL CALC-SCNC: 5 MMOL/L (ref 2–11)
APTT PPP: 167.5 SEC (ref 24.5–34.2)
AST SERPL-CCNC: 48 U/L (ref 15–37)
B PERT DNA SPEC QL NAA+PROBE: NOT DETECTED
BASOPHILS # BLD: 0 K/UL (ref 0–0.2)
BASOPHILS NFR BLD: 0 % (ref 0–2)
BILIRUB SERPL-MCNC: 0.8 MG/DL (ref 0.2–1.1)
BILIRUB UR QL: ABNORMAL
BORDETELLA PARAPERTUSSIS BY PCR: NOT DETECTED
BUN SERPL-MCNC: 29 MG/DL (ref 8–23)
C PNEUM DNA SPEC QL NAA+PROBE: NOT DETECTED
CALCIUM SERPL-MCNC: 10.3 MG/DL (ref 8.3–10.4)
CHLORIDE SERPL-SCNC: 108 MMOL/L (ref 101–110)
CO2 SERPL-SCNC: 23 MMOL/L (ref 21–32)
CREAT SERPL-MCNC: 1.3 MG/DL (ref 0.8–1.5)
DIFFERENTIAL METHOD BLD: ABNORMAL
EKG ATRIAL RATE: 127 BPM
EKG DIAGNOSIS: ABNORMAL
EKG P AXIS: 73 DEGREES
EKG P-R INTERVAL: 146 MS
EKG Q-T INTERVAL: 294 MS
EKG QRS DURATION: 78 MS
EKG QTC CALCULATION (BAZETT): 427 MS
EKG R AXIS: 65 DEGREES
EKG T AXIS: 21 DEGREES
EKG VENTRICULAR RATE: 127 BPM
EOSINOPHIL # BLD: 0 K/UL (ref 0–0.8)
EOSINOPHIL NFR BLD: 0 % (ref 0.5–7.8)
ERYTHROCYTE [DISTWIDTH] IN BLOOD BY AUTOMATED COUNT: 13.8 % (ref 11.9–14.6)
FLUAV SUBTYP SPEC NAA+PROBE: NOT DETECTED
FLUBV RNA SPEC QL NAA+PROBE: NOT DETECTED
GLOBULIN SER CALC-MCNC: 4.3 G/DL (ref 2.8–4.5)
GLUCOSE BLD STRIP.AUTO-MCNC: 106 MG/DL (ref 65–100)
GLUCOSE SERPL-MCNC: 131 MG/DL (ref 65–100)
GLUCOSE UR QL STRIP.AUTO: NEGATIVE MG/DL
HADV DNA SPEC QL NAA+PROBE: NOT DETECTED
HCOV 229E RNA SPEC QL NAA+PROBE: NOT DETECTED
HCOV HKU1 RNA SPEC QL NAA+PROBE: NOT DETECTED
HCOV NL63 RNA SPEC QL NAA+PROBE: NOT DETECTED
HCOV OC43 RNA SPEC QL NAA+PROBE: NOT DETECTED
HCT VFR BLD AUTO: 48 % (ref 41.1–50.3)
HGB BLD-MCNC: 16.2 G/DL (ref 13.6–17.2)
HMPV RNA SPEC QL NAA+PROBE: NOT DETECTED
HPIV1 RNA SPEC QL NAA+PROBE: NOT DETECTED
HPIV2 RNA SPEC QL NAA+PROBE: NOT DETECTED
HPIV3 RNA SPEC QL NAA+PROBE: NOT DETECTED
HPIV4 RNA SPEC QL NAA+PROBE: NOT DETECTED
IMM GRANULOCYTES # BLD AUTO: 0.1 K/UL (ref 0–0.5)
IMM GRANULOCYTES NFR BLD AUTO: 1 % (ref 0–5)
INR PPP: 1.2
KETONES UR-MCNC: ABNORMAL MG/DL
LACTATE SERPL-SCNC: 2.1 MMOL/L (ref 0.4–2)
LACTATE SERPL-SCNC: 2.2 MMOL/L (ref 0.4–2)
LEUKOCYTE ESTERASE UR QL STRIP: NEGATIVE
LYMPHOCYTES # BLD: 1.9 K/UL (ref 0.5–4.6)
LYMPHOCYTES NFR BLD: 15 % (ref 13–44)
M PNEUMO DNA SPEC QL NAA+PROBE: NOT DETECTED
MCH RBC QN AUTO: 31.8 PG (ref 26.1–32.9)
MCHC RBC AUTO-ENTMCNC: 33.8 G/DL (ref 31.4–35)
MCV RBC AUTO: 94.1 FL (ref 82–102)
MONOCYTES # BLD: 0.7 K/UL (ref 0.1–1.3)
MONOCYTES NFR BLD: 6 % (ref 4–12)
NEUTS SEG # BLD: 9.5 K/UL (ref 1.7–8.2)
NEUTS SEG NFR BLD: 78 % (ref 43–78)
NITRITE UR QL: NEGATIVE
NRBC # BLD: 0 K/UL (ref 0–0.2)
NT PRO BNP: 4319 PG/ML (ref 5–125)
PH UR: 5 [PH] (ref 5–9)
PLATELET # BLD AUTO: 156 K/UL (ref 150–450)
PMV BLD AUTO: 9.7 FL (ref 9.4–12.3)
POTASSIUM SERPL-SCNC: 4.9 MMOL/L (ref 3.5–5.1)
PROCALCITONIN SERPL-MCNC: <0.05 NG/ML (ref 0–0.49)
PROT SERPL-MCNC: 7.6 G/DL (ref 6.3–8.2)
PROT UR QL: 100 MG/DL
PROTHROMBIN TIME: 15.6 SEC (ref 12.6–14.3)
RBC # BLD AUTO: 5.1 M/UL (ref 4.23–5.6)
RBC # UR STRIP: NEGATIVE /UL
RSV RNA SPEC QL NAA+PROBE: NOT DETECTED
RV+EV RNA SPEC QL NAA+PROBE: NOT DETECTED
SARS-COV-2 RNA RESP QL NAA+PROBE: NOT DETECTED
SERVICE CMNT-IMP: ABNORMAL
SERVICE CMNT-IMP: ABNORMAL
SODIUM SERPL-SCNC: 136 MMOL/L (ref 133–143)
SP GR UR: >1.03 (ref 1–1.02)
TROPONIN I SERPL HS-MCNC: 1087.4 PG/ML (ref 0–14)
UROBILINOGEN UR QL: 0.2 EU/DL (ref 0.2–1)
WBC # BLD AUTO: 12.2 K/UL (ref 4.3–11.1)

## 2022-11-07 PROCEDURE — 6370000000 HC RX 637 (ALT 250 FOR IP): Performed by: FAMILY MEDICINE

## 2022-11-07 PROCEDURE — 85025 COMPLETE CBC W/AUTO DIFF WBC: CPT

## 2022-11-07 PROCEDURE — 80053 COMPREHEN METABOLIC PANEL: CPT

## 2022-11-07 PROCEDURE — 6360000002 HC RX W HCPCS: Performed by: FAMILY MEDICINE

## 2022-11-07 PROCEDURE — 96372 THER/PROPH/DIAG INJ SC/IM: CPT | Performed by: UROLOGY

## 2022-11-07 PROCEDURE — 93005 ELECTROCARDIOGRAM TRACING: CPT | Performed by: EMERGENCY MEDICINE

## 2022-11-07 PROCEDURE — 1100000003 HC PRIVATE W/ TELEMETRY

## 2022-11-07 PROCEDURE — 2580000003 HC RX 258: Performed by: EMERGENCY MEDICINE

## 2022-11-07 PROCEDURE — 71045 X-RAY EXAM CHEST 1 VIEW: CPT

## 2022-11-07 PROCEDURE — 83880 ASSAY OF NATRIURETIC PEPTIDE: CPT

## 2022-11-07 PROCEDURE — 99285 EMERGENCY DEPT VISIT HI MDM: CPT

## 2022-11-07 PROCEDURE — 94640 AIRWAY INHALATION TREATMENT: CPT

## 2022-11-07 PROCEDURE — 85730 THROMBOPLASTIN TIME PARTIAL: CPT

## 2022-11-07 PROCEDURE — 87040 BLOOD CULTURE FOR BACTERIA: CPT

## 2022-11-07 PROCEDURE — 6360000004 HC RX CONTRAST MEDICATION: Performed by: EMERGENCY MEDICINE

## 2022-11-07 PROCEDURE — 71260 CT THORAX DX C+: CPT | Performed by: EMERGENCY MEDICINE

## 2022-11-07 PROCEDURE — 84145 PROCALCITONIN (PCT): CPT

## 2022-11-07 PROCEDURE — 2580000003 HC RX 258: Performed by: FAMILY MEDICINE

## 2022-11-07 PROCEDURE — 85610 PROTHROMBIN TIME: CPT

## 2022-11-07 PROCEDURE — 96374 THER/PROPH/DIAG INJ IV PUSH: CPT

## 2022-11-07 PROCEDURE — 84484 ASSAY OF TROPONIN QUANT: CPT

## 2022-11-07 PROCEDURE — 81003 URINALYSIS AUTO W/O SCOPE: CPT

## 2022-11-07 PROCEDURE — 0202U NFCT DS 22 TRGT SARS-COV-2: CPT

## 2022-11-07 PROCEDURE — 85520 HEPARIN ASSAY: CPT

## 2022-11-07 PROCEDURE — 83605 ASSAY OF LACTIC ACID: CPT

## 2022-11-07 PROCEDURE — 94664 DEMO&/EVAL PT USE INHALER: CPT

## 2022-11-07 PROCEDURE — 82962 GLUCOSE BLOOD TEST: CPT

## 2022-11-07 PROCEDURE — 6360000002 HC RX W HCPCS: Performed by: EMERGENCY MEDICINE

## 2022-11-07 RX ORDER — DEXTROSE MONOHYDRATE 100 MG/ML
INJECTION, SOLUTION INTRAVENOUS CONTINUOUS PRN
Status: DISCONTINUED | OUTPATIENT
Start: 2022-11-07 | End: 2022-11-11 | Stop reason: HOSPADM

## 2022-11-07 RX ORDER — DIAZEPAM 5 MG/1
5 TABLET ORAL DAILY
Status: DISCONTINUED | OUTPATIENT
Start: 2022-11-08 | End: 2022-11-11 | Stop reason: HOSPADM

## 2022-11-07 RX ORDER — 0.9 % SODIUM CHLORIDE 0.9 %
1000 INTRAVENOUS SOLUTION INTRAVENOUS
Status: COMPLETED | OUTPATIENT
Start: 2022-11-07 | End: 2022-11-07

## 2022-11-07 RX ORDER — 0.9 % SODIUM CHLORIDE 0.9 %
100 INTRAVENOUS SOLUTION INTRAVENOUS ONCE
Status: COMPLETED | OUTPATIENT
Start: 2022-11-07 | End: 2022-11-07

## 2022-11-07 RX ORDER — HEPARIN SODIUM 1000 [USP'U]/ML
80 INJECTION, SOLUTION INTRAVENOUS; SUBCUTANEOUS PRN
Status: DISCONTINUED | OUTPATIENT
Start: 2022-11-07 | End: 2022-11-09

## 2022-11-07 RX ORDER — ATORVASTATIN CALCIUM 40 MG/1
80 TABLET, FILM COATED ORAL NIGHTLY
Status: DISCONTINUED | OUTPATIENT
Start: 2022-11-07 | End: 2022-11-11 | Stop reason: HOSPADM

## 2022-11-07 RX ORDER — HEPARIN SODIUM 10000 [USP'U]/100ML
5-30 INJECTION, SOLUTION INTRAVENOUS CONTINUOUS
Status: DISPENSED | OUTPATIENT
Start: 2022-11-07 | End: 2022-11-09

## 2022-11-07 RX ORDER — HEPARIN SODIUM 1000 [USP'U]/ML
40 INJECTION, SOLUTION INTRAVENOUS; SUBCUTANEOUS PRN
Status: DISCONTINUED | OUTPATIENT
Start: 2022-11-07 | End: 2022-11-09

## 2022-11-07 RX ORDER — ACETAMINOPHEN 325 MG/1
650 TABLET ORAL EVERY 6 HOURS PRN
Status: DISCONTINUED | OUTPATIENT
Start: 2022-11-07 | End: 2022-11-11 | Stop reason: HOSPADM

## 2022-11-07 RX ORDER — BUDESONIDE 0.5 MG/2ML
0.5 INHALANT ORAL 2 TIMES DAILY
Status: DISCONTINUED | OUTPATIENT
Start: 2022-11-07 | End: 2022-11-08

## 2022-11-07 RX ORDER — FAMOTIDINE 20 MG/1
20 TABLET, FILM COATED ORAL 2 TIMES DAILY
Status: DISCONTINUED | OUTPATIENT
Start: 2022-11-07 | End: 2022-11-11 | Stop reason: HOSPADM

## 2022-11-07 RX ORDER — EZETIMIBE 10 MG/1
10 TABLET ORAL NIGHTLY
Status: DISCONTINUED | OUTPATIENT
Start: 2022-11-07 | End: 2022-11-11 | Stop reason: HOSPADM

## 2022-11-07 RX ORDER — SODIUM CHLORIDE 0.9 % (FLUSH) 0.9 %
5-40 SYRINGE (ML) INJECTION EVERY 12 HOURS SCHEDULED
Status: DISCONTINUED | OUTPATIENT
Start: 2022-11-07 | End: 2022-11-11 | Stop reason: HOSPADM

## 2022-11-07 RX ORDER — ALBUTEROL SULFATE 2.5 MG/3ML
2.5 SOLUTION RESPIRATORY (INHALATION) EVERY 8 HOURS PRN
Status: DISCONTINUED | OUTPATIENT
Start: 2022-11-07 | End: 2022-11-11 | Stop reason: HOSPADM

## 2022-11-07 RX ORDER — INSULIN LISPRO 100 [IU]/ML
0-8 INJECTION, SOLUTION INTRAVENOUS; SUBCUTANEOUS
Status: DISCONTINUED | OUTPATIENT
Start: 2022-11-07 | End: 2022-11-11 | Stop reason: HOSPADM

## 2022-11-07 RX ORDER — ONDANSETRON 2 MG/ML
4 INJECTION INTRAMUSCULAR; INTRAVENOUS EVERY 6 HOURS PRN
Status: DISCONTINUED | OUTPATIENT
Start: 2022-11-07 | End: 2022-11-11 | Stop reason: HOSPADM

## 2022-11-07 RX ORDER — TESTOSTERONE CYPIONATE 200 MG/ML
100 INJECTION INTRAMUSCULAR ONCE
Status: COMPLETED | OUTPATIENT
Start: 2022-11-07 | End: 2022-11-07

## 2022-11-07 RX ORDER — ARFORMOTEROL TARTRATE 15 UG/2ML
15 SOLUTION RESPIRATORY (INHALATION) 2 TIMES DAILY
Status: DISCONTINUED | OUTPATIENT
Start: 2022-11-07 | End: 2022-11-08

## 2022-11-07 RX ORDER — ONDANSETRON 4 MG/1
4 TABLET, ORALLY DISINTEGRATING ORAL EVERY 8 HOURS PRN
Status: DISCONTINUED | OUTPATIENT
Start: 2022-11-07 | End: 2022-11-11 | Stop reason: HOSPADM

## 2022-11-07 RX ORDER — ACETAMINOPHEN 650 MG/1
650 SUPPOSITORY RECTAL EVERY 6 HOURS PRN
Status: DISCONTINUED | OUTPATIENT
Start: 2022-11-07 | End: 2022-11-11 | Stop reason: HOSPADM

## 2022-11-07 RX ORDER — SODIUM CHLORIDE 9 MG/ML
INJECTION, SOLUTION INTRAVENOUS PRN
Status: DISCONTINUED | OUTPATIENT
Start: 2022-11-07 | End: 2022-11-11 | Stop reason: HOSPADM

## 2022-11-07 RX ORDER — HEPARIN SODIUM 1000 [USP'U]/ML
80 INJECTION, SOLUTION INTRAVENOUS; SUBCUTANEOUS ONCE
Status: COMPLETED | OUTPATIENT
Start: 2022-11-07 | End: 2022-11-07

## 2022-11-07 RX ORDER — INSULIN LISPRO 100 [IU]/ML
0-4 INJECTION, SOLUTION INTRAVENOUS; SUBCUTANEOUS NIGHTLY
Status: DISCONTINUED | OUTPATIENT
Start: 2022-11-07 | End: 2022-11-11 | Stop reason: HOSPADM

## 2022-11-07 RX ORDER — LEVALBUTEROL INHALATION SOLUTION 1.25 MG/3ML
1.25 SOLUTION RESPIRATORY (INHALATION) EVERY 8 HOURS PRN
Status: DISCONTINUED | OUTPATIENT
Start: 2022-11-07 | End: 2022-11-07 | Stop reason: CLARIF

## 2022-11-07 RX ORDER — SODIUM CHLORIDE 0.9 % (FLUSH) 0.9 %
10 SYRINGE (ML) INJECTION
Status: COMPLETED | OUTPATIENT
Start: 2022-11-07 | End: 2022-11-07

## 2022-11-07 RX ORDER — POLYETHYLENE GLYCOL 3350 17 G/17G
17 POWDER, FOR SOLUTION ORAL DAILY PRN
Status: DISCONTINUED | OUTPATIENT
Start: 2022-11-07 | End: 2022-11-11 | Stop reason: HOSPADM

## 2022-11-07 RX ORDER — ASPIRIN 81 MG/1
81 TABLET ORAL DAILY
Status: DISCONTINUED | OUTPATIENT
Start: 2022-11-08 | End: 2022-11-11 | Stop reason: HOSPADM

## 2022-11-07 RX ORDER — SODIUM CHLORIDE 0.9 % (FLUSH) 0.9 %
5-40 SYRINGE (ML) INJECTION PRN
Status: DISCONTINUED | OUTPATIENT
Start: 2022-11-07 | End: 2022-11-11 | Stop reason: HOSPADM

## 2022-11-07 RX ORDER — NICOTINE 21 MG/24HR
1 PATCH, TRANSDERMAL 24 HOURS TRANSDERMAL DAILY
Status: DISCONTINUED | OUTPATIENT
Start: 2022-11-07 | End: 2022-11-11 | Stop reason: HOSPADM

## 2022-11-07 RX ADMIN — SODIUM CHLORIDE 100 ML: 9 INJECTION, SOLUTION INTRAVENOUS at 17:22

## 2022-11-07 RX ADMIN — SODIUM CHLORIDE, PRESERVATIVE FREE 10 ML: 5 INJECTION INTRAVENOUS at 17:22

## 2022-11-07 RX ADMIN — HEPARIN SODIUM 7660 UNITS: 1000 INJECTION INTRAVENOUS; SUBCUTANEOUS at 18:33

## 2022-11-07 RX ADMIN — ATORVASTATIN CALCIUM 80 MG: 40 TABLET, FILM COATED ORAL at 22:33

## 2022-11-07 RX ADMIN — FAMOTIDINE 20 MG: 20 TABLET, FILM COATED ORAL at 22:34

## 2022-11-07 RX ADMIN — SODIUM CHLORIDE, PRESERVATIVE FREE 5 ML: 5 INJECTION INTRAVENOUS at 22:52

## 2022-11-07 RX ADMIN — EZETIMIBE 10 MG: 10 TABLET ORAL at 22:33

## 2022-11-07 RX ADMIN — ARFORMOTEROL TARTRATE 15 MCG: 15 SOLUTION RESPIRATORY (INHALATION) at 20:54

## 2022-11-07 RX ADMIN — SODIUM CHLORIDE 1000 ML: 9 INJECTION, SOLUTION INTRAVENOUS at 16:26

## 2022-11-07 RX ADMIN — BUDESONIDE 500 MCG: 0.5 INHALANT RESPIRATORY (INHALATION) at 20:54

## 2022-11-07 RX ADMIN — HEPARIN SODIUM 18 UNITS/KG/HR: 10000 INJECTION, SOLUTION INTRAVENOUS at 18:33

## 2022-11-07 RX ADMIN — TESTOSTERONE CYPIONATE 100 MG: 200 INJECTION INTRAMUSCULAR at 11:07

## 2022-11-07 RX ADMIN — IOPAMIDOL 100 ML: 755 INJECTION, SOLUTION INTRAVENOUS at 17:22

## 2022-11-07 ASSESSMENT — PAIN - FUNCTIONAL ASSESSMENT: PAIN_FUNCTIONAL_ASSESSMENT: 0-10

## 2022-11-07 ASSESSMENT — ENCOUNTER SYMPTOMS
ABDOMINAL PAIN: 0
SHORTNESS OF BREATH: 1
BACK PAIN: 0
VOMITING: 0
COUGH: 1

## 2022-11-07 ASSESSMENT — PAIN SCALES - GENERAL: PAINLEVEL_OUTOF10: 0

## 2022-11-07 NOTE — ED TRIAGE NOTES
Patient ambulatory to triage with CO sudden onset SOB with tachycardia noted at home since last night. Denies hx irregular HR. Denies CP reports nausea.

## 2022-11-07 NOTE — PROGRESS NOTES
Pt presents for testosterone injection. After order verified, 100 mg testosterone given IM in right hip. Pt tolerated well. To return in 3 weeks for next injection.

## 2022-11-07 NOTE — ED PROVIDER NOTES
Emergency Department Provider Note                   PCP:                NOT ON FILE               Age: 75 y.o. Sex: male     No diagnosis found. DISPOSITION          MDM  Number of Diagnoses or Management Options  Diagnosis management comments: EKG shows sinus tachycardia with occasional PVC. No diagnostic ST changes. Blood work shows mild leukocytosis 12.2 lactic acid 2.1. Unremarkable basic panel. Respiratory panel negative. Chest x-ray no acute abnormality. Due to patient's significant hypoxia and tachycardia chest CT was obtained and confirms bilateral pulmonary emboli. We will treat with heparin. Hospitalist consulted for admission. Orders Placed This Encounter   Procedures    Culture, Blood 1    Culture, Blood 1    Respiratory Panel, Molecular, with COVID-19 (Restricted: peds pts or suitable admitted adults)    XR CHEST PORTABLE    CT CHEST PULMONARY EMBOLISM W CONTRAST    Lactate, Sepsis    CBC with Auto Differential    CMP    Procalcitonin    Cardiac Monitor - ED Only    Straight Cath (Select if patient is unable to provide a sample)    POCT Urine Dipstick    EKG 12 Lead    Saline lock IV        Medications   0.9 % sodium chloride bolus (1,000 mLs IntraVENous New Bag 11/7/22 1408)       New Prescriptions    No medications on file        Tyra Driver is a 75 y.o. male who presents to the Emergency Department with chief complaint of    Chief Complaint   Patient presents with    Shortness of Breath      26-year-old white male history of hypertension but no cardiopulmonary disease presents with heart racing associated with shortness of breath for the past 2 days. Has had some chills and sweats but no measured temperature. No chest pain. Nonproductive cough. Shortness of breath aggravated by exertion. The history is provided by the patient. Review of Systems   Constitutional:  Positive for chills. Negative for fever.    Respiratory:  Positive for cough and shortness of breath. Cardiovascular:  Negative for chest pain and leg swelling. Gastrointestinal:  Negative for abdominal pain and vomiting. Genitourinary:  Negative for dysuria. Musculoskeletal:  Negative for back pain. Skin:  Negative for rash. Neurological:  Negative for headaches. All other systems reviewed and are negative. Past Medical History:   Diagnosis Date    Arthritis     Asthma     Calculus of kidney     Hypercholesterolemia     Hypertension         Past Surgical History:   Procedure Laterality Date    HEENT Bilateral     cataracts    ORTHOPEDIC SURGERY      rotator cuff ten years ago        Family History   Problem Relation Age of Onset    Asthma Mother     Thyroid Disease Mother     COPD Mother     Heart Disease Father     Hypertension Father         Social History     Socioeconomic History    Marital status:    Tobacco Use    Smoking status: Some Days     Packs/day: 1.00     Types: Cigarettes     Last attempt to quit: 2013     Years since quittin.8    Smokeless tobacco: Never   Substance and Sexual Activity    Alcohol use: Yes     Alcohol/week: 0.0 standard drinks    Drug use: No         Methocarbamol and Ciprofloxacin     Previous Medications    ASPIRIN 81 MG EC TABLET    Take 81 mg by mouth daily    ATORVASTATIN (LIPITOR) 80 MG TABLET    TAKE 1 TABLET EVERY NIGHT    DIAZEPAM (VALIUM) 5 MG TABLET    Take one a day    EZETIMIBE (ZETIA) 10 MG TABLET    Take 10 mg by mouth daily    LISINOPRIL (PRINIVIL;ZESTRIL) 20 MG TABLET    TAKE 1 TABLET EVERY DAY    NAPROXEN SODIUM (ANAPROX) 220 MG TABLET    Take 220 mg by mouth daily    RIVAROXABAN (XARELTO) 2.5 MG TABS TABLET    Take 2.5 mg by mouth 2 times daily (with meals)    TESTOSTERONE CYPIONATE 200 MG/ML SOLN    Inject 100 mg as directed every 14 days    TRAMADOL (ULTRAM) 50 MG TABLET    Take 50 mg by mouth every 6 hours as needed. Vitals signs and nursing note reviewed.    Patient Vitals for the past 4 hrs:   Temp Pulse Resp BP SpO2   11/07/22 1404 98.5 °F (36.9 °C) -- 24 112/84 93 %   11/07/22 1402 -- (!) 130 -- -- (!) 86 %          Physical Exam  Vitals and nursing note reviewed. Constitutional:       General: He is not in acute distress. Appearance: Normal appearance. He is not toxic-appearing. HENT:      Head: Normocephalic and atraumatic. Nose: Nose normal.      Mouth/Throat:      Mouth: Mucous membranes are moist.      Pharynx: Oropharynx is clear. Eyes:      Conjunctiva/sclera: Conjunctivae normal.      Pupils: Pupils are equal, round, and reactive to light. Cardiovascular:      Rate and Rhythm: Regular rhythm. Tachycardia present. Heart sounds: No murmur heard. Pulmonary:      Effort: Pulmonary effort is normal.      Breath sounds: Normal breath sounds. No wheezing or rales. Abdominal:      General: There is no distension. Palpations: Abdomen is soft. Tenderness: There is no abdominal tenderness. Musculoskeletal:         General: No swelling. Normal range of motion. Cervical back: Normal range of motion and neck supple. Skin:     General: Skin is warm and dry. Neurological:      Mental Status: He is alert and oriented to person, place, and time.    Psychiatric:         Mood and Affect: Mood normal.         Behavior: Behavior normal.        EKG 12 Lead    Date/Time: 11/7/2022 5:32 PM  Performed by: Paul Gonzalez MD  Authorized by: Paul Gonzalez MD     ECG reviewed by ED Physician in the absence of a cardiologist: yes    Interpretation:     Interpretation: abnormal    Rate:     ECG rate:  127    ECG rate assessment: tachycardic    Rhythm:     Rhythm: sinus tachycardia    Ectopy:     Ectopy: PVCs      PVCs:  Infrequent  QRS:     QRS axis:  Normal  ST segments:     ST segments:  Non-specific  T waves:     T waves: normal    Critical Care  Performed by: Paul Gonzalez MD  Authorized by: Paul Gonzalez MD     Critical care provider statement:     Critical care time (minutes):  40    Critical care was necessary to treat or prevent imminent or life-threatening deterioration of the following conditions: Management of shortness of breath with hypoxia and tachycardia due to bilateral pulmonary emboli.     Critical care was time spent personally by me on the following activities:  Blood draw for specimens, discussions with consultants, evaluation of patient's response to treatment, examination of patient, ordering and performing treatments and interventions, ordering and review of laboratory studies, ordering and review of radiographic studies, pulse oximetry, re-evaluation of patient's condition and review of old charts    I assumed direction of critical care for this patient from another provider in my specialty: no      Care discussed with: admitting provider        Results for orders placed or performed during the hospital encounter of 11/07/22   Respiratory Panel, Molecular, with COVID-19 (Restricted: peds pts or suitable admitted adults)    Specimen: Nasopharyngeal   Result Value Ref Range    Adenovirus by PCR NOT DETECTED NOTDET      Coronavirus 229E by PCR NOT DETECTED NOTDET      Coronavirus HKU1 by PCR NOT DETECTED NOTDET      Coronavirus NL63 by PCR NOT DETECTED NOTDET      Coronavirus OC43 by PCR NOT DETECTED NOTDET      SARS-CoV-2, PCR NOT DETECTED NOTDET      Human Metapneumovirus by PCR NOT DETECTED NOTDET      Rhinovirus Enterovirus PCR NOT DETECTED NOTDET      Influenza A by PCR NOT DETECTED NOTDET      Influenza B PCR NOT DETECTED NOTDET      Parainfluenza 1 PCR NOT DETECTED NOTDET      Parainfluenza 2 PCR NOT DETECTED NOTDET      Parainfluenza 3 PCR NOT DETECTED NOTDET      Parainfluenza 4 PCR NOT DETECTED NOTDET      Respiratory Syncytial Virus by PCR NOT DETECTED NOTDET      Bordetella parapertussis by PCR NOT DETECTED NOTDET      Bordetella pertussis by PCR NOT DETECTED NOTDET      Chlamydophila Pneumonia PCR NOT DETECTED NOTDET Mycoplasma pneumo by PCR NOT DETECTED NOTDET     XR CHEST PORTABLE    Narrative    PA CHEST ONE VIEW    HISTORY: Sepsis    COMPARISON: None    FINDINGS: The heart size is normal. There is no consolidation, pleural  effusions, or pulmonary edema. Impression    No consolidation.    Lactate, Sepsis   Result Value Ref Range    Lactic Acid, Sepsis 2.1 (H) 0.4 - 2.0 MMOL/L   CBC with Auto Differential   Result Value Ref Range    WBC 12.2 (H) 4.3 - 11.1 K/uL    RBC 5.10 4.23 - 5.6 M/uL    Hemoglobin 16.2 13.6 - 17.2 g/dL    Hematocrit 48.0 41.1 - 50.3 %    MCV 94.1 82 - 102 FL    MCH 31.8 26.1 - 32.9 PG    MCHC 33.8 31.4 - 35.0 g/dL    RDW 13.8 11.9 - 14.6 %    Platelets 567 626 - 220 K/uL    MPV 9.7 9.4 - 12.3 FL    nRBC 0.00 0.0 - 0.2 K/uL    Differential Type AUTOMATED      Seg Neutrophils 78 43 - 78 %    Lymphocytes 15 13 - 44 %    Monocytes 6 4.0 - 12.0 %    Eosinophils % 0 (L) 0.5 - 7.8 %    Basophils 0 0.0 - 2.0 %    Immature Granulocytes 1 0.0 - 5.0 %    Segs Absolute 9.5 (H) 1.7 - 8.2 K/UL    Absolute Lymph # 1.9 0.5 - 4.6 K/UL    Absolute Mono # 0.7 0.1 - 1.3 K/UL    Absolute Eos # 0.0 0.0 - 0.8 K/UL    Basophils Absolute 0.0 0.0 - 0.2 K/UL    Absolute Immature Granulocyte 0.1 0.0 - 0.5 K/UL   CMP   Result Value Ref Range    Sodium 136 133 - 143 mmol/L    Potassium 4.9 3.5 - 5.1 mmol/L    Chloride 108 101 - 110 mmol/L    CO2 23 21 - 32 mmol/L    Anion Gap 5 2 - 11 mmol/L    Glucose 131 (H) 65 - 100 mg/dL    BUN 29 (H) 8 - 23 MG/DL    Creatinine 1.30 0.8 - 1.5 MG/DL    Est, Glom Filt Rate 60 (L) >60 ml/min/1.73m2    Calcium 10.3 8.3 - 10.4 MG/DL    Total Bilirubin 0.8 0.2 - 1.1 MG/DL    ALT 35 12 - 65 U/L    AST 48 (H) 15 - 37 U/L    Alk Phosphatase 73 50 - 136 U/L    Total Protein 7.6 6.3 - 8.2 g/dL    Albumin 3.3 3.2 - 4.6 g/dL    Globulin 4.3 2.8 - 4.5 g/dL    Albumin/Globulin Ratio 0.8 0.4 - 1.6     Procalcitonin   Result Value Ref Range    Procalcitonin <0.05 0.00 - 0.49 ng/mL   EKG 12 Lead   Result Value Ref Range    Ventricular Rate 127 BPM    Atrial Rate 127 BPM    P-R Interval 146 ms    QRS Duration 78 ms    Q-T Interval 294 ms    QTc Calculation (Bazett) 427 ms    P Axis 73 degrees    R Axis 65 degrees    T Axis 21 degrees    Diagnosis       Sinus tachycardia with occasional Premature ventricular complexes        XR CHEST PORTABLE   Final Result   No consolidation. CT CHEST PULMONARY EMBOLISM W CONTRAST    (Results Pending)                       Voice dictation software was used during the making of this note. This software is not perfect and grammatical and other typographical errors may be present. This note has not been completely proofread for errors.      Eveline Cushing, MD  11/07/22 5652

## 2022-11-08 ENCOUNTER — APPOINTMENT (OUTPATIENT)
Dept: ULTRASOUND IMAGING | Age: 67
DRG: 163 | End: 2022-11-08
Payer: MEDICARE

## 2022-11-08 ENCOUNTER — APPOINTMENT (OUTPATIENT)
Dept: NON INVASIVE DIAGNOSTICS | Age: 67
DRG: 163 | End: 2022-11-08
Payer: MEDICARE

## 2022-11-08 ENCOUNTER — APPOINTMENT (OUTPATIENT)
Dept: INTERVENTIONAL RADIOLOGY/VASCULAR | Age: 67
DRG: 163 | End: 2022-11-08
Payer: MEDICARE

## 2022-11-08 LAB
ALBUMIN SERPL-MCNC: 2.7 G/DL (ref 3.2–4.6)
ALBUMIN/GLOB SERPL: 0.8 {RATIO} (ref 0.4–1.6)
ALP SERPL-CCNC: 62 U/L (ref 50–136)
ALT SERPL-CCNC: 27 U/L (ref 12–65)
ANION GAP SERPL CALC-SCNC: 5 MMOL/L (ref 2–11)
AST SERPL-CCNC: 24 U/L (ref 15–37)
BASOPHILS # BLD: 0 K/UL (ref 0–0.2)
BASOPHILS NFR BLD: 0 % (ref 0–2)
BILIRUB SERPL-MCNC: 0.6 MG/DL (ref 0.2–1.1)
BUN SERPL-MCNC: 24 MG/DL (ref 8–23)
CALCIUM SERPL-MCNC: 8.9 MG/DL (ref 8.3–10.4)
CHLORIDE SERPL-SCNC: 110 MMOL/L (ref 101–110)
CO2 SERPL-SCNC: 24 MMOL/L (ref 21–32)
CREAT SERPL-MCNC: 1 MG/DL (ref 0.8–1.5)
DIFFERENTIAL METHOD BLD: ABNORMAL
ECHO AO ASC DIAM: 3.3 CM
ECHO AO ASCENDING AORTA INDEX: 1.59 CM/M2
ECHO AO ROOT DIAM: 3.1 CM
ECHO AO ROOT INDEX: 1.5 CM/M2
ECHO AV AREA PEAK VELOCITY: 2.2 CM2
ECHO AV AREA VTI: 1.8 CM2
ECHO AV AREA/BSA PEAK VELOCITY: 1.1 CM2/M2
ECHO AV AREA/BSA VTI: 0.9 CM2/M2
ECHO AV MEAN GRADIENT: 4 MMHG
ECHO AV MEAN VELOCITY: 0.9 M/S
ECHO AV PEAK GRADIENT: 7 MMHG
ECHO AV PEAK VELOCITY: 1.3 M/S
ECHO AV VELOCITY RATIO: 0.69
ECHO AV VTI: 17.2 CM
ECHO BSA: 2.13 M2
ECHO LA AREA 2C: 10.7 CM2
ECHO LA AREA 4C: 12.5 CM2
ECHO LA DIAMETER INDEX: 1.35 CM/M2
ECHO LA DIAMETER: 2.8 CM
ECHO LA MAJOR AXIS: 4.9 CM
ECHO LA MINOR AXIS: 4.6 CM
ECHO LA TO AORTIC ROOT RATIO: 0.9
ECHO LA VOL 2C: 20 ML (ref 18–58)
ECHO LA VOL 4C: 26 ML (ref 18–58)
ECHO LA VOL BP: 24 ML (ref 18–58)
ECHO LA VOL/BSA BIPLANE: 12 ML/M2 (ref 16–34)
ECHO LA VOLUME INDEX A2C: 10 ML/M2 (ref 16–34)
ECHO LA VOLUME INDEX A4C: 13 ML/M2 (ref 16–34)
ECHO LV E' LATERAL VELOCITY: 7 CM/S
ECHO LV E' SEPTAL VELOCITY: 9 CM/S
ECHO LV EDV A2C: 86 ML
ECHO LV EDV A4C: 113 ML
ECHO LV EDV INDEX A4C: 55 ML/M2
ECHO LV EDV NDEX A2C: 42 ML/M2
ECHO LV EJECTION FRACTION A2C: 42 %
ECHO LV EJECTION FRACTION A4C: 43 %
ECHO LV EJECTION FRACTION BIPLANE: 42 % (ref 55–100)
ECHO LV ESV A2C: 50 ML
ECHO LV ESV A4C: 64 ML
ECHO LV ESV INDEX A2C: 24 ML/M2
ECHO LV ESV INDEX A4C: 31 ML/M2
ECHO LV FRACTIONAL SHORTENING: 21 % (ref 28–44)
ECHO LV INTERNAL DIMENSION DIASTOLE INDEX: 2.03 CM/M2
ECHO LV INTERNAL DIMENSION DIASTOLIC: 4.2 CM (ref 4.2–5.9)
ECHO LV INTERNAL DIMENSION SYSTOLIC INDEX: 1.59 CM/M2
ECHO LV INTERNAL DIMENSION SYSTOLIC: 3.3 CM
ECHO LV IVSD: 1 CM (ref 0.6–1)
ECHO LV MASS 2D: 137.2 G (ref 88–224)
ECHO LV MASS INDEX 2D: 66.3 G/M2 (ref 49–115)
ECHO LV POSTERIOR WALL DIASTOLIC: 1 CM (ref 0.6–1)
ECHO LV RELATIVE WALL THICKNESS RATIO: 0.48
ECHO LVOT AREA: 3.1 CM2
ECHO LVOT AV VTI INDEX: 0.56
ECHO LVOT DIAM: 2 CM
ECHO LVOT MEAN GRADIENT: 2 MMHG
ECHO LVOT PEAK GRADIENT: 3 MMHG
ECHO LVOT PEAK VELOCITY: 0.9 M/S
ECHO LVOT STROKE VOLUME INDEX: 14.6 ML/M2
ECHO LVOT SV: 30.1 ML
ECHO LVOT VTI: 9.6 CM
ECHO MV A VELOCITY: 1.2 M/S
ECHO MV AREA VTI: 1.7 CM2
ECHO MV E DECELERATION TIME (DT): 248 MS
ECHO MV E VELOCITY: 0.58 M/S
ECHO MV E/A RATIO: 0.48
ECHO MV E/E' LATERAL: 8.29
ECHO MV E/E' RATIO (AVERAGED): 7.37
ECHO MV E/E' SEPTAL: 6.44
ECHO MV LVOT VTI INDEX: 1.9
ECHO MV MAX VELOCITY: 1.4 M/S
ECHO MV MEAN GRADIENT: 2 MMHG
ECHO MV MEAN VELOCITY: 0.7 M/S
ECHO MV PEAK GRADIENT: 8 MMHG
ECHO MV VTI: 18.2 CM
ECHO PV ACCELERATION TIME (AT): 100 MS
ECHO PV MAX VELOCITY: 0.7 M/S
ECHO PV PEAK GRADIENT: 2 MMHG
ECHO RV BASAL DIMENSION: 3.9 CM
ECHO RV FREE WALL PEAK S': 10 CM/S
ECHO RV INTERNAL DIMENSION: 3.5 CM
ECHO RV TAPSE: 1.3 CM (ref 1.7–?)
EOSINOPHIL # BLD: 0 K/UL (ref 0–0.8)
EOSINOPHIL NFR BLD: 0 % (ref 0.5–7.8)
ERYTHROCYTE [DISTWIDTH] IN BLOOD BY AUTOMATED COUNT: 13.9 % (ref 11.9–14.6)
EST. AVERAGE GLUCOSE BLD GHB EST-MCNC: 128 MG/DL
GLOBULIN SER CALC-MCNC: 3.4 G/DL (ref 2.8–4.5)
GLUCOSE BLD STRIP.AUTO-MCNC: 104 MG/DL (ref 65–100)
GLUCOSE BLD STRIP.AUTO-MCNC: 152 MG/DL (ref 65–100)
GLUCOSE BLD STRIP.AUTO-MCNC: 99 MG/DL (ref 65–100)
GLUCOSE SERPL-MCNC: 105 MG/DL (ref 65–100)
HBA1C MFR BLD: 6.1 % (ref 4.8–5.6)
HCT VFR BLD AUTO: 41.3 % (ref 41.1–50.3)
HGB BLD-MCNC: 13.9 G/DL (ref 13.6–17.2)
IMM GRANULOCYTES # BLD AUTO: 0.1 K/UL (ref 0–0.5)
IMM GRANULOCYTES NFR BLD AUTO: 1 % (ref 0–5)
LV EF: 48 %
LVEF MODALITY: ABNORMAL
LYMPHOCYTES # BLD: 1.8 K/UL (ref 0.5–4.6)
LYMPHOCYTES NFR BLD: 16 % (ref 13–44)
MAGNESIUM SERPL-MCNC: 2 MG/DL (ref 1.8–2.4)
MCH RBC QN AUTO: 31.9 PG (ref 26.1–32.9)
MCHC RBC AUTO-ENTMCNC: 33.7 G/DL (ref 31.4–35)
MCV RBC AUTO: 94.7 FL (ref 82–102)
MONOCYTES # BLD: 0.7 K/UL (ref 0.1–1.3)
MONOCYTES NFR BLD: 6 % (ref 4–12)
NEUTS SEG # BLD: 8.8 K/UL (ref 1.7–8.2)
NEUTS SEG NFR BLD: 77 % (ref 43–78)
NRBC # BLD: 0 K/UL (ref 0–0.2)
PLATELET # BLD AUTO: 130 K/UL (ref 150–450)
PMV BLD AUTO: 10.8 FL (ref 9.4–12.3)
POTASSIUM SERPL-SCNC: 3.8 MMOL/L (ref 3.5–5.1)
PROT SERPL-MCNC: 6.1 G/DL (ref 6.3–8.2)
RBC # BLD AUTO: 4.36 M/UL (ref 4.23–5.6)
SERVICE CMNT-IMP: ABNORMAL
SERVICE CMNT-IMP: ABNORMAL
SERVICE CMNT-IMP: NORMAL
SODIUM SERPL-SCNC: 139 MMOL/L (ref 133–143)
UFH PPP CHRO-ACNC: 0.96 IU/ML (ref 0.3–0.7)
UFH PPP CHRO-ACNC: >1.1 IU/ML (ref 0.3–0.7)
UFH PPP CHRO-ACNC: >1.1 IU/ML (ref 0.3–0.7)
WBC # BLD AUTO: 11.5 K/UL (ref 4.3–11.1)

## 2022-11-08 PROCEDURE — 2500000003 HC RX 250 WO HCPCS: Performed by: RADIOLOGY

## 2022-11-08 PROCEDURE — 6360000004 HC RX CONTRAST MEDICATION: Performed by: RADIOLOGY

## 2022-11-08 PROCEDURE — 85025 COMPLETE CBC W/AUTO DIFF WBC: CPT

## 2022-11-08 PROCEDURE — 85520 HEPARIN ASSAY: CPT

## 2022-11-08 PROCEDURE — 2580000003 HC RX 258: Performed by: FAMILY MEDICINE

## 2022-11-08 PROCEDURE — 6360000004 HC RX CONTRAST MEDICATION: Performed by: INTERNAL MEDICINE

## 2022-11-08 PROCEDURE — 83735 ASSAY OF MAGNESIUM: CPT

## 2022-11-08 PROCEDURE — 6360000002 HC RX W HCPCS: Performed by: RADIOLOGY

## 2022-11-08 PROCEDURE — 1100000003 HC PRIVATE W/ TELEMETRY

## 2022-11-08 PROCEDURE — 82962 GLUCOSE BLOOD TEST: CPT

## 2022-11-08 PROCEDURE — 02CQ3ZZ EXTIRPATION OF MATTER FROM RIGHT PULMONARY ARTERY, PERCUTANEOUS APPROACH: ICD-10-PCS | Performed by: RADIOLOGY

## 2022-11-08 PROCEDURE — 93970 EXTREMITY STUDY: CPT

## 2022-11-08 PROCEDURE — 93306 TTE W/DOPPLER COMPLETE: CPT | Performed by: INTERNAL MEDICINE

## 2022-11-08 PROCEDURE — 36415 COLL VENOUS BLD VENIPUNCTURE: CPT

## 2022-11-08 PROCEDURE — 6360000002 HC RX W HCPCS: Performed by: EMERGENCY MEDICINE

## 2022-11-08 PROCEDURE — 2580000003 HC RX 258: Performed by: RADIOLOGY

## 2022-11-08 PROCEDURE — 02CP3ZZ EXTIRPATION OF MATTER FROM PULMONARY TRUNK, PERCUTANEOUS APPROACH: ICD-10-PCS | Performed by: RADIOLOGY

## 2022-11-08 PROCEDURE — 83036 HEMOGLOBIN GLYCOSYLATED A1C: CPT

## 2022-11-08 PROCEDURE — 2700000000 HC OXYGEN THERAPY PER DAY

## 2022-11-08 PROCEDURE — C2628 CATHETER, OCCLUSION: HCPCS

## 2022-11-08 PROCEDURE — 36015 PLACE CATHETER IN ARTERY: CPT

## 2022-11-08 PROCEDURE — 94760 N-INVAS EAR/PLS OXIMETRY 1: CPT

## 2022-11-08 PROCEDURE — 2580000003 HC RX 258: Performed by: INTERNAL MEDICINE

## 2022-11-08 PROCEDURE — 6360000002 HC RX W HCPCS: Performed by: INTERNAL MEDICINE

## 2022-11-08 PROCEDURE — C8929 TTE W OR WO FOL WCON,DOPPLER: HCPCS

## 2022-11-08 PROCEDURE — 6370000000 HC RX 637 (ALT 250 FOR IP): Performed by: FAMILY MEDICINE

## 2022-11-08 PROCEDURE — 80053 COMPREHEN METABOLIC PANEL: CPT

## 2022-11-08 RX ORDER — FENTANYL CITRATE 50 UG/ML
INJECTION, SOLUTION INTRAMUSCULAR; INTRAVENOUS
Status: COMPLETED | OUTPATIENT
Start: 2022-11-08 | End: 2022-11-08

## 2022-11-08 RX ORDER — MIDAZOLAM HYDROCHLORIDE 1 MG/ML
INJECTION INTRAMUSCULAR; INTRAVENOUS
Status: COMPLETED | OUTPATIENT
Start: 2022-11-08 | End: 2022-11-08

## 2022-11-08 RX ORDER — SODIUM CHLORIDE 9 MG/ML
INJECTION, SOLUTION INTRAVENOUS CONTINUOUS
Status: DISCONTINUED | OUTPATIENT
Start: 2022-11-08 | End: 2022-11-08

## 2022-11-08 RX ORDER — HEPARIN SODIUM 5000 [USP'U]/ML
INJECTION, SOLUTION INTRAVENOUS; SUBCUTANEOUS
Status: COMPLETED | OUTPATIENT
Start: 2022-11-08 | End: 2022-11-08

## 2022-11-08 RX ORDER — SODIUM CHLORIDE 9 MG/ML
INJECTION, SOLUTION INTRAVENOUS CONTINUOUS PRN
Status: COMPLETED | OUTPATIENT
Start: 2022-11-08 | End: 2022-11-08

## 2022-11-08 RX ORDER — ACETAMINOPHEN 325 MG/1
650 TABLET ORAL EVERY 4 HOURS PRN
Status: DISCONTINUED | OUTPATIENT
Start: 2022-11-08 | End: 2022-11-08 | Stop reason: SDUPTHER

## 2022-11-08 RX ORDER — DIPHENHYDRAMINE HYDROCHLORIDE 50 MG/ML
INJECTION INTRAMUSCULAR; INTRAVENOUS
Status: COMPLETED | OUTPATIENT
Start: 2022-11-08 | End: 2022-11-08

## 2022-11-08 RX ORDER — OXYCODONE HYDROCHLORIDE 5 MG/1
10 TABLET ORAL EVERY 4 HOURS PRN
Status: DISCONTINUED | OUTPATIENT
Start: 2022-11-08 | End: 2022-11-11 | Stop reason: HOSPADM

## 2022-11-08 RX ORDER — LIDOCAINE HYDROCHLORIDE 20 MG/ML
INJECTION, SOLUTION INFILTRATION; PERINEURAL
Status: COMPLETED | OUTPATIENT
Start: 2022-11-08 | End: 2022-11-08

## 2022-11-08 RX ORDER — HEPARIN SODIUM 200 [USP'U]/100ML
INJECTION, SOLUTION INTRAVENOUS CONTINUOUS PRN
Status: COMPLETED | OUTPATIENT
Start: 2022-11-08 | End: 2022-11-08

## 2022-11-08 RX ADMIN — ASPIRIN 81 MG: 81 TABLET ORAL at 12:44

## 2022-11-08 RX ADMIN — HEPARIN SODIUM 2000 ML/HR: 200 INJECTION, SOLUTION INTRAVENOUS at 09:45

## 2022-11-08 RX ADMIN — HEPARIN SODIUM 5 ML: 5000 INJECTION INTRAVENOUS; SUBCUTANEOUS at 09:53

## 2022-11-08 RX ADMIN — EZETIMIBE 10 MG: 10 TABLET ORAL at 20:02

## 2022-11-08 RX ADMIN — MIDAZOLAM 0.5 MG: 1 INJECTION INTRAMUSCULAR; INTRAVENOUS at 10:00

## 2022-11-08 RX ADMIN — Medication 0.15 ML: at 16:15

## 2022-11-08 RX ADMIN — HEPARIN SODIUM 2000 ML/HR: 200 INJECTION, SOLUTION INTRAVENOUS at 10:15

## 2022-11-08 RX ADMIN — ATORVASTATIN CALCIUM 80 MG: 40 TABLET, FILM COATED ORAL at 20:02

## 2022-11-08 RX ADMIN — DIPHENHYDRAMINE HYDROCHLORIDE 50 MG: 50 INJECTION, SOLUTION INTRAMUSCULAR; INTRAVENOUS at 08:50

## 2022-11-08 RX ADMIN — MIDAZOLAM 0.5 MG: 1 INJECTION INTRAMUSCULAR; INTRAVENOUS at 09:37

## 2022-11-08 RX ADMIN — SODIUM CHLORIDE, PRESERVATIVE FREE 10 ML: 5 INJECTION INTRAVENOUS at 12:45

## 2022-11-08 RX ADMIN — SODIUM CHLORIDE 125 ML/HR: 9 INJECTION, SOLUTION INTRAVENOUS at 08:30

## 2022-11-08 RX ADMIN — MIDAZOLAM 0.5 MG: 1 INJECTION INTRAMUSCULAR; INTRAVENOUS at 09:12

## 2022-11-08 RX ADMIN — FENTANYL CITRATE 50 MCG: 50 INJECTION, SOLUTION INTRAMUSCULAR; INTRAVENOUS at 08:50

## 2022-11-08 RX ADMIN — FAMOTIDINE 20 MG: 20 TABLET, FILM COATED ORAL at 12:44

## 2022-11-08 RX ADMIN — FENTANYL CITRATE 25 MCG: 50 INJECTION, SOLUTION INTRAMUSCULAR; INTRAVENOUS at 10:00

## 2022-11-08 RX ADMIN — HEPARIN SODIUM 13 UNITS/KG/HR: 10000 INJECTION, SOLUTION INTRAVENOUS at 12:10

## 2022-11-08 RX ADMIN — MIDAZOLAM 0.5 MG: 1 INJECTION INTRAMUSCULAR; INTRAVENOUS at 10:25

## 2022-11-08 RX ADMIN — FENTANYL CITRATE 25 MCG: 50 INJECTION, SOLUTION INTRAMUSCULAR; INTRAVENOUS at 10:25

## 2022-11-08 RX ADMIN — FENTANYL CITRATE 25 MCG: 50 INJECTION, SOLUTION INTRAMUSCULAR; INTRAVENOUS at 09:12

## 2022-11-08 RX ADMIN — HEPARIN SODIUM 2000 ML/HR: 200 INJECTION, SOLUTION INTRAVENOUS at 09:15

## 2022-11-08 RX ADMIN — IOPAMIDOL 150 ML: 612 INJECTION, SOLUTION INTRAVENOUS at 10:32

## 2022-11-08 RX ADMIN — ACETAMINOPHEN 650 MG: 325 TABLET ORAL at 20:03

## 2022-11-08 RX ADMIN — DIAZEPAM 5 MG: 5 TABLET ORAL at 12:44

## 2022-11-08 RX ADMIN — LIDOCAINE HYDROCHLORIDE 10 ML: 20 INJECTION, SOLUTION INFILTRATION; PERINEURAL at 09:11

## 2022-11-08 RX ADMIN — FENTANYL CITRATE 25 MCG: 50 INJECTION, SOLUTION INTRAMUSCULAR; INTRAVENOUS at 09:37

## 2022-11-08 RX ADMIN — FAMOTIDINE 20 MG: 20 TABLET, FILM COATED ORAL at 20:02

## 2022-11-08 RX ADMIN — MIDAZOLAM 1 MG: 1 INJECTION INTRAMUSCULAR; INTRAVENOUS at 08:50

## 2022-11-08 ASSESSMENT — PAIN DESCRIPTION - ORIENTATION: ORIENTATION: MID

## 2022-11-08 ASSESSMENT — PAIN DESCRIPTION - DESCRIPTORS: DESCRIPTORS: ACHING

## 2022-11-08 ASSESSMENT — PAIN SCALES - GENERAL
PAINLEVEL_OUTOF10: 0
PAINLEVEL_OUTOF10: 3

## 2022-11-08 ASSESSMENT — PAIN - FUNCTIONAL ASSESSMENT: PAIN_FUNCTIONAL_ASSESSMENT: NONE - DENIES PAIN

## 2022-11-08 ASSESSMENT — PAIN DESCRIPTION - LOCATION: LOCATION: HEAD

## 2022-11-08 NOTE — PROGRESS NOTES
TRANSFER - IN REPORT:    Verbal report received from Michael kunz RN on Armen Lora  being received from ED for routine progression of patient care      Report consisted of patient's Situation, Background, Assessment and   Recommendations(SBAR). Information from the following report(s) ED SBAR, MAR, and Recent Results was reviewed with the receiving nurse. Opportunity for questions and clarification was provided. Assessment completed upon patient's arrival to unit and care assumed.

## 2022-11-08 NOTE — PROGRESS NOTES
Hospitalist Progress Note   Admit Date:  2022  3:59 PM   Name:  Rocael Mercado   Age:  75 y.o. Sex:  male  :  1955   MRN:  [id number]   Room:  [facility]/    Reason(s) for Admission: Shortness of breath [R06.02]  Other acute pulmonary embolism without acute cor pulmonale (HCC) [I26.99]  Acute pulmonary embolism, unspecified pulmonary embolism type, unspecified whether acute cor pulmonale present [facility] Course & Interval History:   Mr. Stephen Hinkle is a nice 74 y/o WM with a h/o ABIGAIL, PAD, HTN, obesity, CAD, HLD who was admitted to our service on  with bilateral PE. Recently resumed on testosterone supplementation outpatient after a 2-3 month hiatus due to erythrocytosis. Developed SOB a few days PTA but states he didn't start his testosterone until the day he was admitted? BNP 4k, troponin 1k. CT chest with large bilateral PE. He was started on a heparin drip. IR was consulted and he underwent bilateral PA thrombectomy on . US with RLE DVT but no intervention required for this per IR. Will transition to Eliquis on  AM.    Subjective/24hr Events (22): Feeling much better after his procedure. Notes his mom had history of DVTs but no other family member that he is aware of. No chest pain, N/V/D. Wants to eat. Numerous family members present. Assessment & Plan:   # Acute hypoxemic respiratory failure 2/2 bilateral PE // RLE DVT  - RA sat 86% initially, improved with 2L. Should be able to wean to RA post-procedure. - S/p thrombectomy on . No intervention required for LE DVT. Con't heparin drip tonight and transition to Eliquis tomorrow morning.   - Testosterone seems an unlikely culprit since he reported taking 1 dose the day of admission and his symptoms started several days prior. May warrant outpatient hypercoag work up given his mom's history of DVTs. # Thrombocytopenia   - CBC tomorrow. # HTN   - Currently controlled off ACEi. Monitor.     # PAD   - ASA, statin    # HLD   - Statin, Zetia    # GERD   - Pepcid    Discharge Planning: Home when able. Diet:  ADULT DIET;  Regular; Low Fat/Low Chol/High Fiber/YOVANA  Code status: Full Code    Hospital Problems             Last Modified POA    * (Principal) Acute pulmonary embolism, unspecified pulmonary embolism type, unspecified whether acute cor pulmonale present ([facility] [address] 11/7/2022 Yes    PAD (peripheral artery disease) ([facility] [address] 11/7/2022 Yes    Atherosclerosis of both carotid arteries 11/7/2022 Yes    Obesity (BMI 35.0-39.9 without comorbidity) 11/7/2022 Yes    Essential hypertension 11/7/2022 Yes    Coronary artery disease involving native coronary artery of native heart without angina pectoris 11/7/2022 Yes    Mixed hyperlipidemia 11/7/2022 Yes       Objective:   Patient Vitals for the past 24 hrs:   Temp Pulse Resp BP SpO2   11/08/22 1122 -- 96 16 117/80 96 %   11/08/22 1115 -- 97 16 122/78 97 %   11/08/22 1101 -- 99 16 136/88 96 %   11/08/22 1051 -- 100 16 (!) 132/96 91 %   11/08/22 1042 -- 97 14 119/83 96 %   11/08/22 1037 -- 99 14 (!) 150/92 95 %   11/08/22 1033 -- 99 14 (!) 140/85 98 %   11/08/22 1028 -- (!) 107 15 (!) 145/93 97 %   11/08/22 1023 -- (!) 103 19 (!) 151/103 98 %   11/08/22 1018 -- (!) 107 20 (!) 169/102 96 %   11/08/22 1014 -- (!) 129 23 -- (!) 67 %   11/08/22 1013 -- (!) 126 22 (!) 112/94 (!) 75 %   11/08/22 1008 -- (!) 110 18 (!) 156/101 92 %   11/08/22 1003 -- (!) 115 22 (!) 140/89 91 %   11/08/22 0957 -- (!) 108 16 (!) 139/92 90 %   11/08/22 0952 -- (!) 112 14 (!) 121/93 91 %   11/08/22 0947 -- (!) 103 15 128/89 90 %   11/08/22 0942 -- 88 16 121/82 92 %   11/08/22 0937 -- (!) 105 19 (!) 133/92 92 %   11/08/22 0933 -- (!) 106 15 119/81 90 %   11/08/22 0928 -- (!) 105 13 108/80 91 %   11/08/22 0923 -- (!) 105 14 106/79 90 %   11/08/22 0918 -- (!) 106 16 112/83 (!) 89 %   11/08/22 0912 -- (!) 106 17 (!) 123/90 93 %   11/08/22 0908 -- (!) 106 17 96/79 90 %   11/08/22 0904 -- (!) 107 20 108/78 91 % 11/08/22 0857 -- (!) 112 22 (!) 145/99 94 %   11/08/22 0852 -- (!) 114 29 (!) 154/100 95 %   11/08/22 0848 -- (!) 109 28 (!) 131/90 94 %   11/08/22 0755 98 °F (36.7 °C) (!) 108 18 (!) 124/99 96 %   11/08/22 0353 98.1 °F (36.7 °C) (!) 109 18 133/87 93 %   11/07/22 2313 98.1 °F (36.7 °C) (!) 117 18 137/80 92 %   11/07/22 2252 98.2 °F (36.8 °C) (!) 117 18 138/89 --   11/07/22 2244 -- (!) 117 -- 138/89 --   11/07/22 2222 -- (!) 117 -- 138/89 --   11/07/22 2221 98.2 °F (36.8 °C) (!) 118 18 (!) 125/95 92 %   11/07/22 2054 -- (!) 101 18 -- 98 %   11/07/22 1907 -- (!) 113 -- -- 97 %   11/07/22 1802 -- (!) 111 -- (!) 158/105 97 %       Estimated body mass index is 33.05 kg/m² as calculated from the following:    Height as of this encounter: 5' 7\" (1.702 m). Weight as of this encounter: 211 lb (95.7 kg). Intake/Output Summary (Last 24 hours) at 11/8/2022 1431  Last data filed at 11/8/2022 1335  Gross per 24 hour   Intake 210 ml   Output 1 ml   Net 209 ml         Physical Exam:   Blood pressure 117/80, pulse 96, temperature 98 °F (36.7 °C), temperature source Oral, resp. rate 16, height 5' 7\" (1.702 m), weight 211 lb (95.7 kg), SpO2 96 %. General:    Well nourished. Obese. Pleasant, conversant, awake in bed, NAD/  Head:  Normocephalic, atraumatic  Eyes:  Sclerae appear normal. Pupils equally round. ENT:  Nares appear normal, no drainage. Moist oral mucosa  Neck:  No restricted ROM. Trachea midline. CV:   RRR. No m/r/g. No jugular venous distension. Lungs:   CTAB. No wheezing, rhonchi, or rales. Respirations even, unlabored. Abdomen: Bowel sounds present. Soft, nontender, nondistended. Extremities: No cyanosis or clubbing. RLE edema. Skin:     No rashes and normal coloration. Warm and dry. Neuro:  CN II-XII grossly intact. Sensation intact. A&Ox3  Psych:  Normal mood and affect.       I have reviewed ordered lab tests and independently visualized imaging below:    Recent Labs:  Recent Results (from the past 48 hour(s))   EKG 12 Lead    Collection Time: 11/07/22  1:58 PM   Result Value Ref Range    Ventricular Rate 127 BPM    Atrial Rate 127 BPM    P-R Interval 146 ms    QRS Duration 78 ms    Q-T Interval 294 ms    QTc Calculation (Bazett) 427 ms    P Axis 73 degrees    R Axis 65 degrees    T Axis 21 degrees    Diagnosis       Sinus tachycardia with occasional Premature ventricular complexes   Lactate, Sepsis    Collection Time: 11/07/22  2:19 PM   Result Value Ref Range    Lactic Acid, Sepsis 2.1 (H) 0.4 - 2.0 MMOL/L   CBC with Auto Differential    Collection Time: 11/07/22  2:19 PM   Result Value Ref Range    WBC 12.2 (H) 4.3 - 11.1 K/uL    RBC 5.10 4.23 - 5.6 M/uL    Hemoglobin 16.2 13.6 - 17.2 g/dL    Hematocrit 48.0 41.1 - 50.3 %    MCV 94.1 82 - 102 FL    MCH 31.8 26.1 - 32.9 PG    MCHC 33.8 31.4 - 35.0 g/dL    RDW 13.8 11.9 - 14.6 %    Platelets 241 684 - 220 K/uL    MPV 9.7 9.4 - 12.3 FL    nRBC 0.00 0.0 - 0.2 K/uL    Differential Type AUTOMATED      Seg Neutrophils 78 43 - 78 %    Lymphocytes 15 13 - 44 %    Monocytes 6 4.0 - 12.0 %    Eosinophils % 0 (L) 0.5 - 7.8 %    Basophils 0 0.0 - 2.0 %    Immature Granulocytes 1 0.0 - 5.0 %    Segs Absolute 9.5 (H) 1.7 - 8.2 K/UL    Absolute Lymph # 1.9 0.5 - 4.6 K/UL    Absolute Mono # 0.7 0.1 - 1.3 K/UL    Absolute Eos # 0.0 0.0 - 0.8 K/UL    Basophils Absolute 0.0 0.0 - 0.2 K/UL    Absolute Immature Granulocyte 0.1 0.0 - 0.5 K/UL   CMP    Collection Time: 11/07/22  2:19 PM   Result Value Ref Range    Sodium 136 133 - 143 mmol/L    Potassium 4.9 3.5 - 5.1 mmol/L    Chloride 108 101 - 110 mmol/L    CO2 23 21 - 32 mmol/L    Anion Gap 5 2 - 11 mmol/L    Glucose 131 (H) 65 - 100 mg/dL    BUN 29 (H) 8 - 23 MG/DL    Creatinine 1.30 0.8 - 1.5 MG/DL    Est, Glom Filt Rate 60 (L) >60 ml/min/1.73m2    Calcium 10.3 8.3 - 10.4 MG/DL    Total Bilirubin 0.8 0.2 - 1.1 MG/DL    ALT 35 12 - 65 U/L    AST 48 (H) 15 - 37 U/L    Alk Phosphatase 73 50 - 136 U/L    Total Protein 7.6 6.3 - 8.2 g/dL    Albumin 3.3 3.2 - 4.6 g/dL    Globulin 4.3 2.8 - 4.5 g/dL    Albumin/Globulin Ratio 0.8 0.4 - 1.6     Procalcitonin    Collection Time: 11/07/22  2:19 PM   Result Value Ref Range    Procalcitonin <0.05 0.00 - 0.49 ng/mL   Respiratory Panel, Molecular, with COVID-19 (Restricted: peds pts or suitable admitted adults)    Collection Time: 11/07/22  2:19 PM    Specimen: Nasopharyngeal   Result Value Ref Range    Adenovirus by PCR NOT DETECTED NOTDET      Coronavirus 229E by PCR NOT DETECTED NOTDET      Coronavirus HKU1 by PCR NOT DETECTED NOTDET      Coronavirus NL63 by PCR NOT DETECTED NOTDET      Coronavirus OC43 by PCR NOT DETECTED NOTDET      SARS-CoV-2, PCR NOT DETECTED NOTDET      Human Metapneumovirus by PCR NOT DETECTED NOTDET      Rhinovirus Enterovirus PCR NOT DETECTED NOTDET      Influenza A by PCR NOT DETECTED NOTDET      Influenza B PCR NOT DETECTED NOTDET      Parainfluenza 1 PCR NOT DETECTED NOTDET      Parainfluenza 2 PCR NOT DETECTED NOTDET      Parainfluenza 3 PCR NOT DETECTED NOTDET      Parainfluenza 4 PCR NOT DETECTED NOTDET      Respiratory Syncytial Virus by PCR NOT DETECTED NOTDET      Bordetella parapertussis by PCR NOT DETECTED NOTDET      Bordetella pertussis by PCR NOT DETECTED NOTDET      Chlamydophila Pneumonia PCR NOT DETECTED NOTDET      Mycoplasma pneumo by PCR NOT DETECTED NOTDET     POCT Urinalysis no Micro    Collection Time: 11/07/22  4:29 PM   Result Value Ref Range    Specific Gravity, Urine, POC >1.030 (H) 1.001 - 1.023    pH, Urine, POC 5.0 5.0 - 9.0      Protein, Urine,  (A) NEG mg/dL    Glucose, UA POC Negative NEG mg/dL    Ketones, Urine, POC TRACE (A) NEG mg/dL    Bilirubin, Urine, POC MODERATE (A) NEG      Blood, UA POC Negative NEG      URINE UROBILINOGEN POC 0.2 0.2 - 1.0 EU/dL    Nitrate, Urine, POC Negative NEG      Leukocyte Est, UA POC Negative NEG      Performed by: Magali Ivy    Lactate, Sepsis    Collection Time: 11/07/22  6:33 PM Result Value Ref Range    Lactic Acid, Sepsis 2.2 (H) 0.4 - 2.0 MMOL/L   APTT    Collection Time: 11/07/22  6:33 PM   Result Value Ref Range    .5 (HH) 24.5 - 34.2 SEC   Protime-INR    Collection Time: 11/07/22  6:33 PM   Result Value Ref Range    Protime 15.6 (H) 12.6 - 14.3 sec    INR 1.2     Brain Natriuretic Peptide    Collection Time: 11/07/22  9:36 PM   Result Value Ref Range    NT Pro-BNP 4,319 (H) 5 - 125 PG/ML   Troponin    Collection Time: 11/07/22  9:36 PM   Result Value Ref Range    Troponin, High Sensitivity 1,087.4 (HH) 0 - 14 pg/mL   POCT Glucose    Collection Time: 11/07/22 10:25 PM   Result Value Ref Range    POC Glucose 106 (H) 65 - 100 mg/dL    Performed by: Yassine    Anti-Xa, Unfractionated Heparin    Collection Time: 11/07/22 11:46 PM   Result Value Ref Range    Anti-XA Unfrac Heparin >1.1 (H) 0.3 - 0.7 IU/mL   CBC with Auto Differential    Collection Time: 11/08/22  3:21 AM   Result Value Ref Range    WBC 11.5 (H) 4.3 - 11.1 K/uL    RBC 4.36 4.23 - 5.6 M/uL    Hemoglobin 13.9 13.6 - 17.2 g/dL    Hematocrit 41.3 41.1 - 50.3 %    MCV 94.7 82 - 102 FL    MCH 31.9 26.1 - 32.9 PG    MCHC 33.7 31.4 - 35.0 g/dL    RDW 13.9 11.9 - 14.6 %    Platelets 435 (L) 026 - 450 K/uL    MPV 10.8 9.4 - 12.3 FL    nRBC 0.00 0.0 - 0.2 K/uL    Differential Type AUTOMATED      Seg Neutrophils 77 43 - 78 %    Lymphocytes 16 13 - 44 %    Monocytes 6 4.0 - 12.0 %    Eosinophils % 0 (L) 0.5 - 7.8 %    Basophils 0 0.0 - 2.0 %    Immature Granulocytes 1 0.0 - 5.0 %    Segs Absolute 8.8 (H) 1.7 - 8.2 K/UL    Absolute Lymph # 1.8 0.5 - 4.6 K/UL    Absolute Mono # 0.7 0.1 - 1.3 K/UL    Absolute Eos # 0.0 0.0 - 0.8 K/UL    Basophils Absolute 0.0 0.0 - 0.2 K/UL    Absolute Immature Granulocyte 0.1 0.0 - 0.5 K/UL   Magnesium    Collection Time: 11/08/22  3:21 AM   Result Value Ref Range    Magnesium 2.0 1.8 - 2.4 mg/dL   Comprehensive Metabolic Panel    Collection Time: 11/08/22  3:21 AM   Result Value Ref Range    Sodium 139 133 - 143 mmol/L    Potassium 3.8 3.5 - 5.1 mmol/L    Chloride 110 101 - 110 mmol/L    CO2 24 21 - 32 mmol/L    Anion Gap 5 2 - 11 mmol/L    Glucose 105 (H) 65 - 100 mg/dL    BUN 24 (H) 8 - 23 MG/DL    Creatinine 1.00 0.8 - 1.5 MG/DL    Est, Glom Filt Rate >60 >60 ml/min/1.73m2    Calcium 8.9 8.3 - 10.4 MG/DL    Total Bilirubin 0.6 0.2 - 1.1 MG/DL    ALT 27 12 - 65 U/L    AST 24 15 - 37 U/L    Alk Phosphatase 62 50 - 136 U/L    Total Protein 6.1 (L) 6.3 - 8.2 g/dL    Albumin 2.7 (L) 3.2 - 4.6 g/dL    Globulin 3.4 2.8 - 4.5 g/dL    Albumin/Globulin Ratio 0.8 0.4 - 1.6     Hemoglobin A1c    Collection Time: 11/08/22  3:21 AM   Result Value Ref Range    Hemoglobin A1C 6.1 (H) 4.8 - 5.6 %    eAG 128 mg/dL   Anti-Xa, Unfractionated Heparin    Collection Time: 11/08/22  8:20 AM   Result Value Ref Range    Anti-XA Unfrac Heparin 0.96 (H) 0.3 - 0.7 IU/mL   POCT Glucose    Collection Time: 11/08/22 12:04 PM   Result Value Ref Range    POC Glucose 104 (H) 65 - 100 mg/dL    Performed by: [facility]          Other Studies:  XR CHEST PORTABLE    Result Date: 11/7/2022  PA CHEST ONE VIEW HISTORY: Sepsis COMPARISON: None FINDINGS: The heart size is normal. There is no consolidation, pleural effusions, or pulmonary edema. No consolidation. CT CHEST PULMONARY EMBOLISM W CONTRAST    Result Date: 11/7/2022  CT Chest with contrast Clinical Indication:  Acute severe shortness of breath, tachycardia, sepsis evaluation. History of coronary artery disease. Comparison:  Radiograph chest earlier today, CT cardiac 9/29/2016 Technique:  Dose reduction technique used: Automated exposure Control and/or adjustment of mA and kV according to patient size. Contiguous axial images were obtained from the neck base through the upper abdomen following the uneventful administration of 100 cc Isovue 370 intravenous contrast. Pulmonary embolus protocol was used.   Coronal reconstructions were done for further evaluation of vessels. Findings:  Pulmonary arteries are opacified with contrast, normal in caliber. There are filling defects involving the distal main pulmonary artery bilaterally with extension into all lobar branches, nearly occlusive. No ventricular septal bowing is evident. There are coronary artery calcifications, and aortic calcifications. Aorta is normal in caliber. No pleural or pericardial effusion. Lung windows demonstrate AP elongation of trachea which can be seen with COPD. No evidence of consolidation, pneumothorax or pulmonary mass. Small peripheral pulmonary infarctions involving lateral right middle and lower lobes. Limited upper abdominal views demonstrate unchanged left renal cortical calcification and right renal cyst. Bones demonstrate no acute osseous abnormality. 1. Acute large bilateral pulmonary emboli. 2. Small right peripheral lung pulmonary infarctions. 3. Coronary artery and aortic calcifications. 4. Probable COPD. DC5 The critical results contained in this report were communicated to Dr. Henrry Huston by Dr Darron Cam on the phone at 5:30 PM. Critical results were communicated as outlined in Section II.C.2.a.i of the ACR Practice Guideline for Communication of Diagnostic Imaging Findings. Vascular duplex lower extremity venous bilateral    Result Date: 11/8/2022  Bilateral lower extremity venous duplex exam. CLINICAL INDICATION: Shortness of breath, pulmonary embolus PROCEDURE: Realtime grayscale, color, and spectral Doppler analysis of the bilateral lower extremity deep venous system from the common femoral vein through the posterior tibial and peroneal veins. COMPARISON: No prior similar studies available for direct comparison. FINDINGS: Right leg: There is occlusive thrombus in the mid to distal femoral vein and in the popliteal vein with occlusive thrombus also noted in the posterior tibial and peroneal veins. Left leg:  There is normal compressibility appreciated throughout the imaged venous structures. Normal flow dynamics are noted. No echogenic intraluminal filling defect noted to indicate deep venous thrombosis. Occlusive DVT in the right femoral, popliteal, posterior tibial and peroneal veins.        Current Meds:  Current Facility-Administered Medications   Medication Dose Route Frequency    oxyCODONE (ROXICODONE) immediate release tablet 10 mg  10 mg Oral Q4H PRN    Or    oxyCODONE (ROXICODONE) immediate release tablet 10 mg  10 mg Oral Q4H PRN    [START ON 11/9/2022] apixaban (ELIQUIS) tablet 10 mg  10 mg Oral BID    heparin (porcine) injection 7,660 Units  80 Units/kg IntraVENous PRN    heparin (porcine) injection 3,830 Units  40 Units/kg IntraVENous PRN    heparin 25,000 units in dextrose 5% 250 mL (premix) infusion  5-30 Units/kg/hr IntraVENous Continuous    sodium chloride flush 0.9 % injection 5-40 mL  5-40 mL IntraVENous 2 times per day    sodium chloride flush 0.9 % injection 5-40 mL  5-40 mL IntraVENous PRN    0.9 % sodium chloride infusion   IntraVENous PRN    ondansetron (ZOFRAN-ODT) disintegrating tablet 4 mg  4 mg Oral Q8H PRN    Or    ondansetron (ZOFRAN) injection 4 mg  4 mg IntraVENous Q6H PRN    polyethylene glycol (GLYCOLAX) packet 17 g  17 g Oral Daily PRN    acetaminophen (TYLENOL) tablet 650 mg  650 mg Oral Q6H PRN    Or    acetaminophen (TYLENOL) suppository 650 mg  650 mg Rectal Q6H PRN    glucose chewable tablet 16 g  4 tablet Oral PRN    dextrose bolus 10% 125 mL  125 mL IntraVENous PRN    Or    dextrose bolus 10% 250 mL  250 mL IntraVENous PRN    glucagon (rDNA) injection 1 mg  1 mg SubCUTAneous PRN    dextrose 10 % infusion   IntraVENous Continuous PRN    insulin lispro (HUMALOG) injection vial 0-8 Units  0-8 Units SubCUTAneous TID WC    insulin lispro (HUMALOG) injection vial 0-4 Units  0-4 Units SubCUTAneous Nightly    atorvastatin (LIPITOR) tablet 80 mg  80 mg Oral Nightly    diazePAM (VALIUM) tablet 5 mg  5 mg Oral Daily    aspirin EC tablet 81 mg  81 mg Oral Daily    ezetimibe (ZETIA) tablet 10 mg  10 mg Oral Nightly    nicotine (NICODERM CQ) 21 MG/24HR 1 patch  1 patch TransDERmal Daily    famotidine (PEPCID) tablet 20 mg  20 mg Oral BID    tiotropium (SPIRIVA RESPIMAT) 2.5 MCG/ACT inhaler 2 puff  2 puff Inhalation Daily    albuterol (PROVENTIL) nebulizer solution 2.5 mg  2.5 mg Nebulization Q8H PRN       Signed:  Larry Dueñas MD    Part of this note may have been written by using a voice dictation software. The note has been proof read but may still contain some grammatical/other typographical errors.

## 2022-11-08 NOTE — H&P
Hospitalist History and Physical   Admit Date:  2022  3:59 PM   Name:  Ruchi Gonzalez   Age:  75 y.o. Sex:  male  :  1955   MRN:  [id number]   Room:  [place] [address]    Presenting Complaint: Shortness of Breath     Reason(s) for Admission: Acute pulmonary embolism, unspecified pulmonary embolism type, unspecified whether acute cor pulmonale present ([facility] [I26.99]     History of Present Illness:   Ruchi Gonzalez is a 75 y.o. male with medical history of obesity, hypogonadism on testosterone, PAD, tobacco abuse who presented with sudden onset SOB with elevated HR and palpitations at home since last night with 2-3 day history of GUERRERO. Admits to some tactile chills and sweats but no known fevers. Has never happened before. No sick contacts. SOB worse with exertion. +non productive cough. Quit smoking in  but restarted and has not smoked for several days due to progressive SOB over 2 days    RR 24 //105 86% spo2 on RA  WBC 12.2K Scr 1.3 BUN 29 Procal nil. Viral respiratory panel negative. LA 2.1 UA no LE/nitrates. CXR nil   CTA PE   1. Acute large bilateral pulmonary emboli. 2. Small right peripheral lung pulmonary infarctions. 3. Coronary artery and aortic calcifications. 4. Probable COPD. S/p 1 L NS , heparin bolus    Was prescribed low dose Xarelto for PAD however it costs 500% monthly w/ his insurance so did not start. No recent travel or immobility. Recently restarted back on testosterone. Was taken off for several months due to erythrocytosis. And restarted on it every 3 weeks rather than every 2 weeks. No family h/o cancer or blood clots. No personal history of either. Had not had colon cancer screening     Review of Systems:  10 systems reviewed and negative except as noted in HPI. Assessment & Plan:     Acute large bilateral PE - submassive, high risk with elevated LA. No RHS on CTA. HDS. Check BNP and troponin. Suspect provoked from testosterone therapy. Heparin gtt. TTE. Venous duplex BLE (RLE larger than left). Remote telemetry. Advised to stop testosterone and needs colon cancer screening and probably low dose CT for lung CA screen outpatient. Check FOBT. Consult IR to evaluate for mechanical interventions. Plan to start [address] in 24 to 48 hours pending clinical progression. Acute hypoxic respiratory failure 2/2 VTE and pulmonary infarcts - improved with 2 L NC. Tobacco dependence syndrome - smoking cessation counseling. NRT. Needs RX on discharge. PAD - asa, statin, smoking cessation     Hyperglycemia - start SSI. Check a1c     HLD - statin     Obesity - adds to complexity     Essential tremor - f/b Dr Cecelia Bourgeois. Cont valium 5 mg daily     Patient is critically ill. Without intervention, there is a high probability of acute organ impairment or life-threatening deterioration in the patient's condition from: PE, hypoxia, RHS, pulmonary infarct   Total critical care time spent: 44 minutes. Time is indicative of direct patient attendance at bedside and on the patient's floor nearby. Includes time spent at bedside performing history and exam, performing chart review, discussing findings and treatment plan with patient and/or family, discussing patient with nursing staff, consultants and colleagues, and ordering/reviewing pertinent laboratory and radiographic evaluations. Time excludes procedures. CPT:  13558: First 30-74 minutes  17459: Each block of 30 min. beyond 76        Anticipated discharge needs:         Diet: ADULT DIET;  Regular; Low Fat/Low Chol/High Fiber/YOVANA  VTE ppx: heparin gtt   Code status: Full Code    Hospital Problems:  Principal Problem:    Acute pulmonary embolism, unspecified pulmonary embolism type, unspecified whether acute cor pulmonale present ([facility]  Active Problems:    PAD (peripheral artery disease) (HCC)    Atherosclerosis of both carotid arteries    Obesity (BMI 35.0-39.9 without comorbidity)    Essential hypertension    Coronary artery disease involving native coronary artery of native heart without angina pectoris    Mixed hyperlipidemia  Resolved Problems:    * No resolved hospital problems. *       Past History:     Past Medical History:   Diagnosis Date    Arthritis     Asthma     Calculus of kidney     Hypercholesterolemia     Hypertension        Past Surgical History:   Procedure Laterality Date    HEENT Bilateral     cataracts    ORTHOPEDIC SURGERY      rotator cuff ten years ago        Social History     Tobacco Use    Smoking status: Every Day     Packs/day: 1.00     Types: Cigarettes    Smokeless tobacco: Never   Substance Use Topics    Alcohol use:  Yes     Alcohol/week: 0.0 standard drinks      Social History     Substance and Sexual Activity   Drug Use No       Family History   Problem Relation Age of Onset    Asthma Mother     Thyroid Disease Mother     COPD Mother     Heart Disease Father     Hypertension Father     Cancer Neg Hx     Deep Vein Thrombosis Neg Hx         Immunization History   Administered Date(s) Administered    Influenza, FLUARIX, FLULAVAL, FLUZONE (age 10 mo+) AND AFLURIA, (age 1 y+), PF, 0.5mL 11/01/2018, 09/10/2019     Allergies   Allergen Reactions    Methocarbamol Hives    Ciprofloxacin Nausea And Vomiting     Prior to Admit Medications:  Current Outpatient Medications   Medication Instructions    aspirin 81 mg, Oral, DAILY    atorvastatin (LIPITOR) 80 MG tablet TAKE 1 TABLET EVERY NIGHT    diazePAM (VALIUM) 5 MG tablet Take one a day    ezetimibe (ZETIA) 10 mg, Oral, DAILY    lisinopril (PRINIVIL;ZESTRIL) 20 MG tablet TAKE 1 TABLET EVERY DAY    naproxen sodium (ANAPROX) 220 mg, Oral, DAILY    rivaroxaban (XARELTO) 2.5 mg, Oral, 2 TIMES DAILY WITH MEALS    Testosterone Cypionate 100 mg, Injection, EVERY 14 DAYS    traMADol (ULTRAM) 50 mg, Oral, EVERY 6 HOURS PRN         Objective:   Patient Vitals for the past 24 hrs:   Temp Pulse Resp BP SpO2   11/07/22 1907 -- (!) 113 -- -- 97 %   11/07/22 1802 -- Isaac Lozano 111 -- (!) 158/105 97 %   11/07/22 1404 98.5 °F (36.9 °C) -- 24 112/84 93 %   11/07/22 1402 -- (!) 130 -- -- (!) 86 %       Oxygen Therapy  SpO2: 97 %  O2 Device: None (Room air)    Estimated body mass index is 33.05 kg/m² as calculated from the following:    Height as of this encounter: 5' 7\" (1.702 m). Weight as of this encounter: 211 lb (95.7 kg). No intake or output data in the 24 hours ending 11/07/22 1957      Physical Exam:    Blood pressure (!) 158/105, pulse (!) 113, temperature 98.5 °F (36.9 °C), temperature source Oral, resp. rate 24, height 5' 7\" (1.702 m), weight 211 lb (95.7 kg), SpO2 97 %. General:    Obese. Ill appearing, NAD  Head:  Normocephalic, atraumatic  Eyes:  Sclerae appear normal.  Pupils equally round. ENT:  Nares appear normal, no drainage. Moist oral mucosa  Neck:  No restricted ROM. Trachea midline   CV:   Tachycardic rate, regular rhythm  No m/r/g. No jugular venous distension. Lungs:   Increased WOB; scant wheeze. No rales   Abdomen: Bowel sounds present. Soft, nontender, nondistended. Extremities: RLE varicosities. R leg > left leg. Skin:     No rashes and normal coloration. Warm and dry. Neuro:  CN II-XII grossly intact. Sensation intact. A&Ox3  Psych:  Normal mood and affect.       I have personally reviewed labs and tests showing:  Recent Labs:  Recent Results (from the past 24 hour(s))   EKG 12 Lead    Collection Time: 11/07/22  1:58 PM   Result Value Ref Range    Ventricular Rate 127 BPM    Atrial Rate 127 BPM    P-R Interval 146 ms    QRS Duration 78 ms    Q-T Interval 294 ms    QTc Calculation (Bazett) 427 ms    P Axis 73 degrees    R Axis 65 degrees    T Axis 21 degrees    Diagnosis       Sinus tachycardia with occasional Premature ventricular complexes   Lactate, Sepsis    Collection Time: 11/07/22  2:19 PM   Result Value Ref Range    Lactic Acid, Sepsis 2.1 (H) 0.4 - 2.0 MMOL/L   CBC with Auto Differential    Collection Time: 11/07/22  2:19 PM   Result Value Ref Range    WBC 12.2 (H) 4.3 - 11.1 K/uL    RBC 5.10 4.23 - 5.6 M/uL    Hemoglobin 16.2 13.6 - 17.2 g/dL    Hematocrit 48.0 41.1 - 50.3 %    MCV 94.1 82 - 102 FL    MCH 31.8 26.1 - 32.9 PG    MCHC 33.8 31.4 - 35.0 g/dL    RDW 13.8 11.9 - 14.6 %    Platelets 451 358 - 420 K/uL    MPV 9.7 9.4 - 12.3 FL    nRBC 0.00 0.0 - 0.2 K/uL    Differential Type AUTOMATED      Seg Neutrophils 78 43 - 78 %    Lymphocytes 15 13 - 44 %    Monocytes 6 4.0 - 12.0 %    Eosinophils % 0 (L) 0.5 - 7.8 %    Basophils 0 0.0 - 2.0 %    Immature Granulocytes 1 0.0 - 5.0 %    Segs Absolute 9.5 (H) 1.7 - 8.2 K/UL    Absolute Lymph # 1.9 0.5 - 4.6 K/UL    Absolute Mono # 0.7 0.1 - 1.3 K/UL    Absolute Eos # 0.0 0.0 - 0.8 K/UL    Basophils Absolute 0.0 0.0 - 0.2 K/UL    Absolute Immature Granulocyte 0.1 0.0 - 0.5 K/UL   CMP    Collection Time: 11/07/22  2:19 PM   Result Value Ref Range    Sodium 136 133 - 143 mmol/L    Potassium 4.9 3.5 - 5.1 mmol/L    Chloride 108 101 - 110 mmol/L    CO2 23 21 - 32 mmol/L    Anion Gap 5 2 - 11 mmol/L    Glucose 131 (H) 65 - 100 mg/dL    BUN 29 (H) 8 - 23 MG/DL    Creatinine 1.30 0.8 - 1.5 MG/DL    Est, Glom Filt Rate 60 (L) >60 ml/min/1.73m2    Calcium 10.3 8.3 - 10.4 MG/DL    Total Bilirubin 0.8 0.2 - 1.1 MG/DL    ALT 35 12 - 65 U/L    AST 48 (H) 15 - 37 U/L    Alk Phosphatase 73 50 - 136 U/L    Total Protein 7.6 6.3 - 8.2 g/dL    Albumin 3.3 3.2 - 4.6 g/dL    Globulin 4.3 2.8 - 4.5 g/dL    Albumin/Globulin Ratio 0.8 0.4 - 1.6     Procalcitonin    Collection Time: 11/07/22  2:19 PM   Result Value Ref Range    Procalcitonin <0.05 0.00 - 0.49 ng/mL   Respiratory Panel, Molecular, with COVID-19 (Restricted: peds pts or suitable admitted adults)    Collection Time: 11/07/22  2:19 PM    Specimen: Nasopharyngeal   Result Value Ref Range    Adenovirus by PCR NOT DETECTED NOTDET      Coronavirus 229E by PCR NOT DETECTED NOTDET      Coronavirus HKU1 by PCR NOT DETECTED NOTDET      Coronavirus NL63 by PCR NOT DETECTED NOTDET      Coronavirus OC43 by PCR NOT DETECTED NOTDET      SARS-CoV-2, PCR NOT DETECTED NOTDET      Human Metapneumovirus by PCR NOT DETECTED NOTDET      Rhinovirus Enterovirus PCR NOT DETECTED NOTDET      Influenza A by PCR NOT DETECTED NOTDET      Influenza B PCR NOT DETECTED NOTDET      Parainfluenza 1 PCR NOT DETECTED NOTDET      Parainfluenza 2 PCR NOT DETECTED NOTDET      Parainfluenza 3 PCR NOT DETECTED NOTDET      Parainfluenza 4 PCR NOT DETECTED NOTDET      Respiratory Syncytial Virus by PCR NOT DETECTED NOTDET      Bordetella parapertussis by PCR NOT DETECTED NOTDET      Bordetella pertussis by PCR NOT DETECTED NOTDET      Chlamydophila Pneumonia PCR NOT DETECTED NOTDET      Mycoplasma pneumo by PCR NOT DETECTED NOTDET     POCT Urinalysis no Micro    Collection Time: 11/07/22  4:29 PM   Result Value Ref Range    Specific Gravity, Urine, POC >1.030 (H) 1.001 - 1.023    pH, Urine, POC 5.0 5.0 - 9.0      Protein, Urine,  (A) NEG mg/dL    Glucose, UA POC Negative NEG mg/dL    Ketones, Urine, POC TRACE (A) NEG mg/dL    Bilirubin, Urine, POC MODERATE (A) NEG      Blood, UA POC Negative NEG      URINE UROBILINOGEN POC 0.2 0.2 - 1.0 EU/dL    Nitrate, Urine, POC Negative NEG      Leukocyte Est, UA POC Negative NEG      Performed by: Mildred Willams    Lactate, Sepsis    Collection Time: 11/07/22  6:33 PM   Result Value Ref Range    Lactic Acid, Sepsis 2.2 (H) 0.4 - 2.0 MMOL/L   APTT    Collection Time: 11/07/22  6:33 PM   Result Value Ref Range    .5 (HH) 24.5 - 34.2 SEC   Protime-INR    Collection Time: 11/07/22  6:33 PM   Result Value Ref Range    Protime 15.6 (H) 12.6 - 14.3 sec    INR 1.2         I have personally reviewed imaging studies showing:  XR CHEST PORTABLE    Result Date: 11/7/2022  PA CHEST ONE VIEW HISTORY: Sepsis COMPARISON: None FINDINGS: The heart size is normal. There is no consolidation, pleural effusions, or pulmonary edema. No consolidation.     CT CHEST PULMONARY EMBOLISM W CONTRAST    Result Date: 11/7/2022  CT Chest with contrast Clinical Indication:  Acute severe shortness of breath, tachycardia, sepsis evaluation. History of coronary artery disease. Comparison:  Radiograph chest earlier today, CT cardiac 9/29/2016 Technique:  Dose reduction technique used: Automated exposure Control and/or adjustment of mA and kV according to patient size. Contiguous axial images were obtained from the neck base through the upper abdomen following the uneventful administration of 100 cc Isovue 370 intravenous contrast. Pulmonary embolus protocol was used. Coronal reconstructions were done for further evaluation of vessels. Findings:  Pulmonary arteries are opacified with contrast, normal in caliber. There are filling defects involving the distal main pulmonary artery bilaterally with extension into all lobar branches, nearly occlusive. No ventricular septal bowing is evident. There are coronary artery calcifications, and aortic calcifications. Aorta is normal in caliber. No pleural or pericardial effusion. Lung windows demonstrate AP elongation of trachea which can be seen with COPD. No evidence of consolidation, pneumothorax or pulmonary mass. Small peripheral pulmonary infarctions involving lateral right middle and lower lobes. Limited upper abdominal views demonstrate unchanged left renal cortical calcification and right renal cyst. Bones demonstrate no acute osseous abnormality. 1. Acute large bilateral pulmonary emboli. 2. Small right peripheral lung pulmonary infarctions. 3. Coronary artery and aortic calcifications. 4. Probable COPD. DC5 The critical results contained in this report were communicated to Dr. Olga Burns by Dr Barbie Rich on the phone at 5:30 PM. Critical results were communicated as outlined in Section II.C.2.a.i of the ACR Practice Guideline for Communication of Diagnostic Imaging Findings.        Echocardiogram:  No results found for this or any previous visit. Orders Placed This Encounter   Medications    0.9 % sodium chloride bolus    sodium chloride flush 0.9 % injection 10 mL    0.9 % sodium chloride bolus    iopamidol (ISOVUE-370) 76 % injection 100 mL    heparin (porcine) injection 7,660 Units    heparin (porcine) injection 7,660 Units    heparin (porcine) injection 3,830 Units    heparin 25,000 units in dextrose 5% 250 mL (premix) infusion    sodium chloride flush 0.9 % injection 5-40 mL    sodium chloride flush 0.9 % injection 5-40 mL    0.9 % sodium chloride infusion    OR Linked Order Group     ondansetron (ZOFRAN-ODT) disintegrating tablet 4 mg     ondansetron (ZOFRAN) injection 4 mg    polyethylene glycol (GLYCOLAX) packet 17 g    OR Linked Order Group     acetaminophen (TYLENOL) tablet 650 mg     acetaminophen (TYLENOL) suppository 650 mg    glucose chewable tablet 16 g    OR Linked Order Group     dextrose bolus 10% 125 mL     dextrose bolus 10% 250 mL    glucagon (rDNA) injection 1 mg    dextrose 10 % infusion    insulin lispro (HUMALOG) injection vial 0-8 Units    insulin lispro (HUMALOG) injection vial 0-4 Units    atorvastatin (LIPITOR) tablet 80 mg    diazePAM (VALIUM) tablet 5 mg    aspirin EC tablet 81 mg    ezetimibe (ZETIA) tablet 10 mg    nicotine (NICODERM CQ) 21 MG/24HR 1 patch    famotidine (PEPCID) tablet 20 mg         Signed:  Negin Stallings DO, DO    Part of this note may have been written by using a voice dictation software. The note has been proof read but may still contain some grammatical/other typographical errors.

## 2022-11-08 NOTE — BRIEF OP NOTE
Department of Interventional Radiology  [phone]        Interventional Radiology Brief Procedure Note    Patient: Yonas García MRN: [id number]  SSN: [id number]    YOB: 1955  Age: 75 y.o. Sex: male      Date of Procedure: 11/8/2022    Pre-Procedure Diagnosis: PE, SOB, Hypoxia. No LE edema or pain. Post-Procedure Diagnosis: SAME    Procedure(s):  Pulmonary Intervention    Brief Description of Procedure: Pulmonary Thrombectomy. Performed By: Abhi Robertson MD     Assistants: None    Anesthesia:Moderate Sedation    Estimated Blood Loss: 30 cc    Specimens:  None    Implants:  None    Findings: Right >> Left PE nearly completely evacuated today. Complications: None    Recommendations: 2 hour bedrest.  LE venous US today. Continue Heparin for now. Follow Up: Will follow up on US.       Signed By: Abhi Robertson MD     November 8, 2022

## 2022-11-08 NOTE — OR NURSING
TRANSFER - OUT REPORT:    Verbal report given to PRAFUL Rossi on Xuan Kaur  being transferred to room 830 for routine progression of patient care       Report consisted of patient's Situation, Background, Assessment and   Recommendations(SBAR). Information from the following report(s) Nurse Handoff Report, Surgery Report, Intake/Output and MAR was reviewed with the receiving nurse. Lines:   Peripheral IV 11/07/22 Right Wrist (Active)   Site Assessment Clean, dry & intact 11/08/22 0724   Line Status Infusing 11/08/22 0724   Phlebitis Assessment No symptoms 11/08/22 0724   Infiltration Assessment 0 11/08/22 0724   Alcohol Cap Used No 11/08/22 0724   Dressing Status Clean, dry & intact 11/08/22 0724   Dressing Type Transparent 11/08/22 0724       Peripheral IV 11/07/22 Distal;Left Cephalic (Active)   Site Assessment Clean, dry & intact 11/08/22 0724   Line Status Capped 11/08/22 0724   Line Care Cap changed 11/08/22 0449   Phlebitis Assessment No symptoms 11/08/22 0724   Infiltration Assessment 0 11/08/22 0724   Alcohol Cap Used No 11/08/22 0724   Dressing Status Clean, dry & intact 11/08/22 0724   Dressing Type Transparent 11/08/22 0724        Opportunity for questions and clarification was provided.       Patient transported with:  Transport

## 2022-11-08 NOTE — PROGRESS NOTES
Department of Interventional Radiology  [phone]                                 Progress Note  Patient: Stanford Tafoya MRN: [id number]  SSN: [id number]    YOB: 1955  Age: 75 y.o. Sex: male      Subjective:   Patient  had a pulmonary thrombectomy under moderate sedation today. Due to him having significant bilateral PE a vascular ultrasound was ordered after his procedure which does show an occlusive DVT in the right femoral, popliteal, posterior tibial and peroneal veins. Patient has not been having any swelling in his right leg and at this time denies any significant pain. He is recovering well after his thrombectomy procedure he just had a few hours ago. Review of Systems:    Pertinent items are noted in HPI. Objective:     Vitals:    11/08/22 1051 11/08/22 1101 11/08/22 1115 11/08/22 1122   BP: (!) 132/96 136/88 122/78 117/80   Pulse: 100 99 97 96   Resp: 16 16 16 16   Temp:       TempSrc:       SpO2: 91% 96% 97% 96%   Weight:       Height:            Pain Assessment                      Pain Level: 0                                    Physical Exam:   HEART: regular rate and rhythm, S1, S2 normal, no murmur, click, rub or gallop  LUNG: clear to auscultation bilaterally  ABDOMEN: soft, non-tender; bowel sounds normal; no masses,  no organomegaly  EXTREMITIES: Patient does not have any swelling in his lower extremities. Skin is warm and dry to touch. No tenderness to palpation in the calf.   No evidence of phlegmasia  Lab/Data Review:  CBC:   Lab Results   Component Value Date/Time    WBC 11.5 11/08/2022 03:21 AM    RBC 4.36 11/08/2022 03:21 AM    HGB 13.9 11/08/2022 03:21 AM    HCT 41.3 11/08/2022 03:21 AM    MCV 94.7 11/08/2022 03:21 AM    RDW 13.9 11/08/2022 03:21 AM     11/08/2022 03:21 AM     CMP:    Lab Results   Component Value Date/Time     11/08/2022 03:21 AM    K 3.8 11/08/2022 03:21 AM     11/08/2022 03:21 AM    CO2 24 11/08/2022 03:21 AM    BUN 24 11/08/2022 03:21 AM    PROT 6.1 11/08/2022 03:21 AM     Assessment:   69-year-old male with past medical history of obesity, hypogonadism on testosterone, PAD, tobacco abuse, and recent diagnosis of bilateral pulmonary emboli. Pt underwent a thrombectomy earlier today with interventional radiology. Vascular duplex ultrasound of lower extremities was obtained after the procedure which shows occlusive DVT in the right femoral, popliteal, posterior tibial and peroneal veins. Plan:   Patient is not having any pain or swelling in his right lower extremity at this time and no evidence of phlegmasia therefore he does not need a thrombectomy of the right lower extremity. [employer] will come and fit him for compression stockings today. He can continue with anticoagulation and we will recheck him tomorrow to see if there are any worsening symptoms in his right leg.     Signed By: EBENEZER Briones     November 8, 2022

## 2022-11-08 NOTE — PRE SEDATION
Sedation Pre-Procedure Note    Patient Name: Porsche Killian   YOB: 1955  Room/Bed: [facility]  Medical Record Number: [id number]  Date: 11/8/2022   Time: 8:17 AM       Indication: Bilateral pulmonary emboli    Consent: I have discussed with the patient and/or the patient representative the indication, alternatives, and the possible risks and/or complications of the planned procedure and the anesthesia methods. The patient and/or patient representative appear to understand and agree to proceed. Vital Signs:   Vitals:    11/08/22 0755   BP: (!) 124/99   Pulse: (!) 108   Resp: 18   Temp: 98 °F (36.7 °C)   SpO2: 96%       Past Medical History:   has a past medical history of Arthritis, Asthma, Calculus of kidney, Hypercholesterolemia, and Hypertension. Past Surgical History:   has a past surgical history that includes orthopedic surgery and heent (Bilateral). Medications:   Scheduled Meds:    sodium chloride flush  5-40 mL IntraVENous 2 times per day    insulin lispro  0-8 Units SubCUTAneous TID WC    insulin lispro  0-4 Units SubCUTAneous Nightly    atorvastatin  80 mg Oral Nightly    diazePAM  5 mg Oral Daily    aspirin  81 mg Oral Daily    ezetimibe  10 mg Oral Nightly    nicotine  1 patch TransDERmal Daily    famotidine  20 mg Oral BID    Arformoterol Tartrate  15 mcg Nebulization BID    budesonide  0.5 mg Nebulization BID    tiotropium  2 puff Inhalation Daily     Continuous Infusions:    heparin (PORCINE) Infusion 15 Units/kg/hr (11/08/22 0234)    sodium chloride      dextrose       PRN Meds: heparin (porcine), heparin (porcine), sodium chloride flush, sodium chloride, ondansetron **OR** ondansetron, polyethylene glycol, acetaminophen **OR** acetaminophen, glucose, dextrose bolus **OR** dextrose bolus, glucagon (rDNA), dextrose, albuterol  Home Meds:   Prior to Admission medications    Medication Sig Start Date End Date Taking?  Authorizing Provider   diazePAM (VALIUM) 5 MG tablet Take one a day 10/25/22 11/25/22  Aide Gellerquest, DO   Testosterone Cypionate 200 MG/ML SOLN Inject 100 mg as directed every 14 days 8/17/22   Collette Altes, MD   aspirin 81 MG EC tablet Take 81 mg by mouth daily    Ar Automatic Reconciliation   atorvastatin (LIPITOR) 80 MG tablet TAKE 1 TABLET EVERY NIGHT 8/26/20   Ar Automatic Reconciliation   ezetimibe (ZETIA) 10 MG tablet Take 10 mg by mouth daily 6/11/21   Ar Automatic Reconciliation   lisinopril (PRINIVIL;ZESTRIL) 20 MG tablet TAKE 1 TABLET EVERY DAY 11/8/21   Ar Automatic Reconciliation   naproxen sodium (ANAPROX) 220 MG tablet Take 220 mg by mouth daily    Ar Automatic Reconciliation   rivaroxaban (XARELTO) 2.5 MG TABS tablet Take 2.5 mg by mouth 2 times daily (with meals) 6/11/21   Ar Automatic Reconciliation   traMADol (ULTRAM) 50 MG tablet Take 50 mg by mouth every 6 hours as needed. Ar Automatic Reconciliation          Pre-Sedation Documentation and Exam:   I have personally completed a history, physical exam & review of systems for this patient (see notes). Vital signs have been reviewed (see flow sheet for vitals).     Mallampati Airway Assessment:  normal, dentition not prohibitive, Mallampati Class II - (soft palate, fauces & uvula are visible)  Patient is edentulous    Prior History of Anesthesia Complications:   none    ASA Classification:  Class 3 - A patient with severe systemic disease that limits activity but is not incapacitating    Sedation/ Anesthesia Plan:   intravenous sedation    Medications Planned:   midazolam (Versed) intravenously and fentanyl intravenously    Patient is an appropriate candidate for plan of sedation: yes    Electronically signed by EBENEZER Mccurdy on 11/8/2022 at 8:17 AM

## 2022-11-08 NOTE — ED NOTES
Provider notified of Critical result on PTT of 167.5     [facility], RN  11/07/22 1948

## 2022-11-08 NOTE — ED NOTES
TRANSFER - OUT REPORT:    Verbal report given to Antonio BASILIO on Mandie Gupta  being transferred to  830 for routine progression of patient care       Report consisted of patient's Situation, Background, Assessment and   Recommendations(SBAR). Information from the following report(s) ED Encounter Summary, ED SBAR and [facility] was reviewed with the receiving nurse. Lines:   Peripheral IV 11/07/22 Right Wrist (Active)   Site Assessment Clean, dry & intact 11/07/22 1602   Line Status Blood return noted 11/07/22 1602   Phlebitis Assessment No symptoms 11/07/22 1602   Infiltration Assessment 0 11/07/22 1602       Peripheral IV 11/07/22 Distal;Left Cephalic (Active)        Opportunity for questions and clarification was provided.       Patient transported with:  Monitor, O2 @ 8lpm and Registered Nurse       Georgiana Martinez RN  11/07/22 9702

## 2022-11-08 NOTE — OR NURSING
TRANSFER - OUT REPORT:    Verbal report given to PRAFUL Yadav on Nicolás Barbosa  being transferred to IR prep/recovery room #2 for routine progression of patient care       Report consisted of patient's Situation, Background, Assessment and   Recommendations(SBAR). Information from the following report(s) Nurse Handoff Report, Surgery Report, Intake/Output and MAR was reviewed with the receiving nurse. Lines:   Peripheral IV 11/07/22 Right Wrist (Active)   Site Assessment Clean, dry & intact 11/08/22 0724   Line Status Infusing 11/08/22 0724   Phlebitis Assessment No symptoms 11/08/22 0724   Infiltration Assessment 0 11/08/22 0724   Alcohol Cap Used No 11/08/22 0724   Dressing Status Clean, dry & intact 11/08/22 0724   Dressing Type Transparent 11/08/22 0724       Peripheral IV 11/07/22 Distal;Left Cephalic (Active)   Site Assessment Clean, dry & intact 11/08/22 0724   Line Status Capped 11/08/22 0724   Line Care Cap changed 11/08/22 0449   Phlebitis Assessment No symptoms 11/08/22 0724   Infiltration Assessment 0 11/08/22 0724   Alcohol Cap Used No 11/08/22 0724   Dressing Status Clean, dry & intact 11/08/22 0724   Dressing Type Transparent 11/08/22 0724        Opportunity for questions and clarification was provided.       Patient transported with:  Registered Nurse

## 2022-11-08 NOTE — FLOWSHEET NOTE
Patient on 2 L NC. Heparin drip @ 15 units. Safety measures noted. Will continue to monitor per policy.      11/08/22 0719   Handoff   Communication Given Shift Handoff   Handoff Received From [facility]   Handoff Communication Face to Face   Time Handoff Given 9025

## 2022-11-08 NOTE — CARE COORDINATION
11/08/22 1350   Service Assessment   Patient Orientation Alert and Oriented   Cognition Alert   History Provided By Patient   Support Systems Spouse/Significant Other   PCP Verified by CM Yes   Prior Functional Level Independent in ADLs/IADLs   Can patient return to prior living arrangement Yes   Ability to make needs known: Good   Would you like for me to discuss the discharge plan with any other family members/significant others, and if so, who? Yes   Financial Resources Medicare   Social/Functional History   Lives With Spouse   Home Layout One level   Home Access Stairs to enter with rails   Entrance Stairs - Number of Steps 5   Ambulation Assistance Independent   Transfer Assistance Independent   Active  Yes   Confirmed patient has a pcp -Lavinia Sanders. He had his first appointment today but missed it due to this hospital stay. No history of HH or rehab. Patient lives with his wife and stepson. His brother and sister in law are supportive. DME: none  CM following for discharge needs.

## 2022-11-09 LAB
ANION GAP SERPL CALC-SCNC: 6 MMOL/L (ref 2–11)
BASOPHILS # BLD: 0 K/UL (ref 0–0.2)
BASOPHILS NFR BLD: 0 % (ref 0–2)
BUN SERPL-MCNC: 16 MG/DL (ref 8–23)
CALCIUM SERPL-MCNC: 8.6 MG/DL (ref 8.3–10.4)
CHLORIDE SERPL-SCNC: 111 MMOL/L (ref 101–110)
CO2 SERPL-SCNC: 24 MMOL/L (ref 21–32)
CREAT SERPL-MCNC: 0.9 MG/DL (ref 0.8–1.5)
DIFFERENTIAL METHOD BLD: ABNORMAL
EKG ATRIAL RATE: 108 BPM
EKG DIAGNOSIS: NORMAL
EKG P AXIS: 74 DEGREES
EKG P-R INTERVAL: 152 MS
EKG Q-T INTERVAL: 340 MS
EKG QRS DURATION: 92 MS
EKG QTC CALCULATION (BAZETT): 455 MS
EKG R AXIS: 70 DEGREES
EKG T AXIS: 51 DEGREES
EKG VENTRICULAR RATE: 108 BPM
EOSINOPHIL # BLD: 0.1 K/UL (ref 0–0.8)
EOSINOPHIL NFR BLD: 1 % (ref 0.5–7.8)
ERYTHROCYTE [DISTWIDTH] IN BLOOD BY AUTOMATED COUNT: 13.9 % (ref 11.9–14.6)
GLUCOSE BLD STRIP.AUTO-MCNC: 119 MG/DL (ref 65–100)
GLUCOSE BLD STRIP.AUTO-MCNC: 122 MG/DL (ref 65–100)
GLUCOSE BLD STRIP.AUTO-MCNC: 154 MG/DL (ref 65–100)
GLUCOSE BLD STRIP.AUTO-MCNC: 96 MG/DL (ref 65–100)
GLUCOSE SERPL-MCNC: 90 MG/DL (ref 65–100)
HCT VFR BLD AUTO: 38.1 % (ref 41.1–50.3)
HGB BLD-MCNC: 12.2 G/DL (ref 13.6–17.2)
IMM GRANULOCYTES # BLD AUTO: 0.2 K/UL (ref 0–0.5)
IMM GRANULOCYTES NFR BLD AUTO: 1 % (ref 0–5)
LYMPHOCYTES # BLD: 1.9 K/UL (ref 0.5–4.6)
LYMPHOCYTES NFR BLD: 15 % (ref 13–44)
MCH RBC QN AUTO: 30.9 PG (ref 26.1–32.9)
MCHC RBC AUTO-ENTMCNC: 32 G/DL (ref 31.4–35)
MCV RBC AUTO: 96.5 FL (ref 82–102)
MONOCYTES # BLD: 0.7 K/UL (ref 0.1–1.3)
MONOCYTES NFR BLD: 6 % (ref 4–12)
NEUTS SEG # BLD: 9.6 K/UL (ref 1.7–8.2)
NEUTS SEG NFR BLD: 77 % (ref 43–78)
NRBC # BLD: 0 K/UL (ref 0–0.2)
PLATELET # BLD AUTO: 112 K/UL (ref 150–450)
PMV BLD AUTO: 10.6 FL (ref 9.4–12.3)
POTASSIUM SERPL-SCNC: 3.9 MMOL/L (ref 3.5–5.1)
RBC # BLD AUTO: 3.95 M/UL (ref 4.23–5.6)
SERVICE CMNT-IMP: ABNORMAL
SERVICE CMNT-IMP: NORMAL
SODIUM SERPL-SCNC: 141 MMOL/L (ref 133–143)
UFH PPP CHRO-ACNC: 0.4 IU/ML (ref 0.3–0.7)
WBC # BLD AUTO: 12.5 K/UL (ref 4.3–11.1)

## 2022-11-09 PROCEDURE — 6370000000 HC RX 637 (ALT 250 FOR IP): Performed by: INTERNAL MEDICINE

## 2022-11-09 PROCEDURE — 93005 ELECTROCARDIOGRAM TRACING: CPT | Performed by: INTERNAL MEDICINE

## 2022-11-09 PROCEDURE — 1100000003 HC PRIVATE W/ TELEMETRY

## 2022-11-09 PROCEDURE — 36415 COLL VENOUS BLD VENIPUNCTURE: CPT

## 2022-11-09 PROCEDURE — 97162 PT EVAL MOD COMPLEX 30 MIN: CPT

## 2022-11-09 PROCEDURE — 97535 SELF CARE MNGMENT TRAINING: CPT

## 2022-11-09 PROCEDURE — 82962 GLUCOSE BLOOD TEST: CPT

## 2022-11-09 PROCEDURE — 97530 THERAPEUTIC ACTIVITIES: CPT

## 2022-11-09 PROCEDURE — 6370000000 HC RX 637 (ALT 250 FOR IP): Performed by: FAMILY MEDICINE

## 2022-11-09 PROCEDURE — 80048 BASIC METABOLIC PNL TOTAL CA: CPT

## 2022-11-09 PROCEDURE — 85025 COMPLETE CBC W/AUTO DIFF WBC: CPT

## 2022-11-09 PROCEDURE — 6370000000 HC RX 637 (ALT 250 FOR IP): Performed by: RADIOLOGY

## 2022-11-09 PROCEDURE — 2580000003 HC RX 258: Performed by: FAMILY MEDICINE

## 2022-11-09 PROCEDURE — 97165 OT EVAL LOW COMPLEX 30 MIN: CPT

## 2022-11-09 PROCEDURE — 85520 HEPARIN ASSAY: CPT

## 2022-11-09 RX ORDER — MIDAZOLAM HYDROCHLORIDE 1 MG/ML
INJECTION INTRAMUSCULAR; INTRAVENOUS
Status: COMPLETED | OUTPATIENT
Start: 2022-11-08 | End: 2022-11-08

## 2022-11-09 RX ORDER — FENTANYL CITRATE 50 UG/ML
INJECTION, SOLUTION INTRAMUSCULAR; INTRAVENOUS
Status: COMPLETED | OUTPATIENT
Start: 2022-11-08 | End: 2022-11-08

## 2022-11-09 RX ORDER — ALBUTEROL SULFATE 90 UG/1
2 AEROSOL, METERED RESPIRATORY (INHALATION) EVERY 4 HOURS PRN
Qty: 1 EACH | Refills: 0 | Status: SHIPPED | OUTPATIENT
Start: 2022-11-09

## 2022-11-09 RX ADMIN — SODIUM CHLORIDE, PRESERVATIVE FREE 10 ML: 5 INJECTION INTRAVENOUS at 09:22

## 2022-11-09 RX ADMIN — EZETIMIBE 10 MG: 10 TABLET ORAL at 21:02

## 2022-11-09 RX ADMIN — OXYCODONE 10 MG: 5 TABLET ORAL at 13:08

## 2022-11-09 RX ADMIN — DIAZEPAM 5 MG: 5 TABLET ORAL at 09:21

## 2022-11-09 RX ADMIN — APIXABAN 10 MG: 5 TABLET, FILM COATED ORAL at 21:02

## 2022-11-09 RX ADMIN — ATORVASTATIN CALCIUM 80 MG: 40 TABLET, FILM COATED ORAL at 21:02

## 2022-11-09 RX ADMIN — FAMOTIDINE 20 MG: 20 TABLET, FILM COATED ORAL at 21:02

## 2022-11-09 RX ADMIN — ASPIRIN 81 MG: 81 TABLET ORAL at 09:21

## 2022-11-09 RX ADMIN — FAMOTIDINE 20 MG: 20 TABLET, FILM COATED ORAL at 09:21

## 2022-11-09 RX ADMIN — APIXABAN 10 MG: 5 TABLET, FILM COATED ORAL at 09:21

## 2022-11-09 RX ADMIN — ACETAMINOPHEN 650 MG: 325 TABLET ORAL at 23:30

## 2022-11-09 RX ADMIN — SODIUM CHLORIDE, PRESERVATIVE FREE 5 ML: 5 INJECTION INTRAVENOUS at 21:02

## 2022-11-09 ASSESSMENT — PAIN DESCRIPTION - DESCRIPTORS: DESCRIPTORS: ACHING

## 2022-11-09 ASSESSMENT — PAIN SCALES - GENERAL
PAINLEVEL_OUTOF10: 0
PAINLEVEL_OUTOF10: 8
PAINLEVEL_OUTOF10: 0

## 2022-11-09 ASSESSMENT — PAIN DESCRIPTION - ORIENTATION: ORIENTATION: MID;RIGHT;LEFT

## 2022-11-09 ASSESSMENT — PAIN DESCRIPTION - LOCATION: LOCATION: HEAD

## 2022-11-09 NOTE — PROGRESS NOTES
ACUTE PHYSICAL THERAPY GOALS:   (Developed with and agreed upon by patient and/or caregiver.)  (1.) Julia Alessio  will move from supine to sit and sit to supine , scoot up and down, and roll side to side with MODIFIED INDEPENDENCE within 7 treatment day(s). (2.) Julia Alessio will transfer from bed to chair and chair to bed with MODIFIED INDEPENDENCE using the least restrictive device within 7 treatment day(s). (3.) Julia Mccallum will ambulate with MODIFIED INDEPENDENCE for 300 feet with the least restrictive device within 7 treatment day(s). (4.) Julia Mccallum will perform standing static and dynamic balance activities x 25 minutes with MODIFIED INDEPENDENCE to improve safety and activity tolerance within 7 treatment day(s). (5.) Julia Mccallum will ascend and descend 6 stairs using one hand rail(s) with MODIFIED INDEPENDENCE to improve functional mobility and safety within 7 treatment day(s). (6.) Julia Mccallum will perform therapeutic exercises x 20 min for HEP with INDEPENDENCE to improve strength, endurance, and functional mobility within 7 treatment day(s).      PHYSICAL THERAPY Initial Assessment, Daily Note, and PM  (Link to Caseload Tracking: PT Visit Days : 1  Acknowledge Orders  Time In/Out  PT Charge Capture  Rehab Caseload Tracker    Julia Mccallum is a 75 y.o. male   PRIMARY DIAGNOSIS: Acute pulmonary embolism, unspecified pulmonary embolism type, unspecified whether acute cor pulmonale present (HCC)  Shortness of breath [R06.02]  Other acute pulmonary embolism without acute cor pulmonale (HCC) [I26.99]  Acute pulmonary embolism, unspecified pulmonary embolism type, unspecified whether acute cor pulmonale present ([facility] [address] [I26.99]       Reason for Referral: Generalized Muscle Weakness (M62.81)  Difficulty in walking, Not elsewhere classified (R26.2)  Other abnormalities of gait and mobility (R26.89)  Inpatient: Payor: Kat Dvae / Plan: Darnell Owen O / Product Type: *No Product type* /     ASSESSMENT:     REHAB RECOMMENDATIONS:   Recommendation to date pending progress:  Setting:  Home Health Therapy    Equipment:    Rolling Walker     ASSESSMENT:  Mr. Ashwini Beard is a 75year old M who presented to hospital with bilateral PE, RLE DVT. Pt underwent pulmonary thrombectomy on 11/8, was started on heparin to address DVT. This date pt performs mobility including bed mobility, sitting balance activities, sit <> stand transfers, standing balance activities, and ambulation in room and halllway with CGA, BUE support at . Pt has a shoe lift he wears on his LLE. Pt endorsed significant fatigue following  minimal exertion; noted HR increased significantly to 147 bpm. RN and MD notified. Similar episode with pt getting up just to stand at sink. SpO2 >90% on room air throughout. Pt presents as functioning below his baseline, with deficits in mobility including transfers, gait, balance, and activity tolerance. Pt will benefit from skilled therapy services to address stated deficits to promote return to highest level of function, independence, and safety. Will continue to follow.      [address] [facility] [address] [address] AM-PAC 6 Clicks Basic Mobility Inpatient Short Form  AM-PAC Mobility Inpatient   How much difficulty turning over in bed?: A Little  How much difficulty sitting down on / standing up from a chair with arms?: A Little  How much difficulty moving from lying on back to sitting on side of bed?: A Little  How much help from another person moving to and from a bed to a chair?: A Little  How much help from another person needed to walk in hospital room?: A Little  How much help from another person for climbing 3-5 steps with a railing?: A Little  [facility] Inpatient Mobility Raw Score : 18  AM-PAC Inpatient T-Scale Score : 43.63  Mobility Inpatient CMS 0-100% Score: 46.58  Mobility Inpatient CMS G-Code Modifier : CK    SUBJECTIVE:   Mr. Ashwini Beard states, \"I just feel tired\" Social/Functional Lives With: Spouse  Home Layout: One level  Home Access: Stairs to enter with rails  Entrance Stairs - Number of Steps: 5  Has the patient had two or more falls in the past year or any fall with injury in the past year?: Yes  Ambulation Assistance: Independent  Transfer Assistance: Independent  Active : Yes    OBJECTIVE:     PAIN: Leim Kassie / O2: Dorrine  / Moran Jaime / Marcille Givens:   Pre Treatment:   Pain Assessment: None - Denies Pain      Post Treatment: 0/10 Vitals        Oxygen      Telemetry   Telemetry   RESTRICTIONS/PRECAUTIONS:                    GROSS EVALUATION: Intact Impaired (Comments):   AROM [x]     PROM [x]    Strength []   Functional weakness BLE. Balance [] Sitting - Static: Good  Sitting - Dynamic: Good, -  Standing - Static: Fair, +  Standing - Dynamic: Fair   Posture [] Forward Head  Rounded Shoulders   Sensation [x]     Coordination [x]      Tone []     Edema []    Activity Tolerance []   Limited. Tachy with minimal exertion.  SpO2 >90% on room air    []      COGNITION/  PERCEPTION: Intact Impaired (Comments):   Orientation [x]     Vision [x]     Hearing [x]     Cognition  [x]       MOBILITY: I Mod I S SBA CGA Min Mod Max Total  NT x2 Comments:   Bed Mobility    Rolling [] [] [] [] [x] [] [] [] [] [] []    Supine to Sit [] [] [] [] [x] [] [] [] [] [] []    Scooting [] [] [] [] [x] [] [] [] [] [] []    Sit to Supine [] [] [] [] [x] [] [] [] [] [] []    Transfers    Sit to Stand [] [] [] [] [x] [x] [] [] [] [] []    Bed to Chair [] [] [] [] [x] [x] [] [] [] [] []    Stand to Sit [] [] [] [] [x] [x] [] [] [] [] []     [] [] [] [] [] [] [] [] [] [] []    I=Independent, Mod I=Modified Independent, S=Supervision, SBA=Standby Assistance, CGA=Contact Guard Assistance,   Min=Minimal Assistance, Mod=Moderate Assistance, Max=Maximal Assistance, Total=Total Assistance, NT=Not Tested    GAIT: I Mod I S SBA CGA Min Mod Max Total  NT x2 Comments:   Level of Assistance [] [] [] [] [x] [x] [] [] [] [] []    Distance 100 + 20 feet    DME Rolling Walker    Gait Quality Path deviations , Trunk sway increased, and Wide base of support    Weightbearing Status      Stairs      I=Independent, Mod I=Modified Independent, S=Supervision, SBA=Standby Assistance, CGA=Contact Guard Assistance,   Min=Minimal Assistance, Mod=Moderate Assistance, Max=Maximal Assistance, Total=Total Assistance, NT=Not Tested    PLAN:   FREQUENCY AND DURATION: 3 times/week for duration of hospital stay or until stated goals are met, whichever comes first.    THERAPY PROGNOSIS: Good    PROBLEM LIST:   (Skilled intervention is medically necessary to address:)  Decreased Activity Tolerance  Decreased Balance  Decreased Gait Ability  Decreased Strength  Decreased Transfer Abilities INTERVENTIONS PLANNED:   (Benefits and precautions of physical therapy have been discussed with the patient.)  Therapeutic Activity  Therapeutic Exercise/HEP  Neuromuscular Re-education  Gait Training  Education       TREATMENT:   EVALUATION: MODERATE COMPLEXITY: (Untimed Charge)    TREATMENT:   Therapeutic Activity (12 Minutes): Therapeutic activity included Supine to Sit, Sit to Supine, Transfer Training, Ambulation on level ground, Sitting balance , and Standing balance to improve functional Activity tolerance, Balance, Mobility, and Strength.     TREATMENT GRID:  N/A    AFTER TREATMENT PRECAUTIONS: Bed, Bed/Chair Locked, Call light within reach, Needs within reach, RN notified, and Visitors at bedside    INTERDISCIPLINARY COLLABORATION:  MD/ PA/ NP , RN/ PCT, PT/ PTA, and OT/ WELDON    EDUCATION: Education Given To: Patient  Education Provided: Role of Therapy;Plan of Care  Education Outcome: Verbalized understanding    TIME IN/OUT:  Time In: 1331  Time Out: [place]  Minutes: [facility],

## 2022-11-09 NOTE — PROGRESS NOTES
Pt resting in bed comfortably at this time, alert and oriented times 4. No distress noted, respirations even and unlabored on 2 L NC. Pt denies pain at this time. Heparin gtt stopped. Pt instructed to call for assistance if needed, call light in place, will continue to monitor.

## 2022-11-09 NOTE — PLAN OF CARE
Problem: Discharge Planning  Goal: Discharge to home or other facility with appropriate resources  Outcome: Progressing  Flowsheets (Taken 11/8/2022 0724 by Jerad Collins RN)  Discharge to home or other facility with appropriate resources:   Identify barriers to discharge with patient and caregiver   Arrange for needed discharge resources and transportation as appropriate   Identify discharge learning needs (meds, wound care, etc)   Refer to discharge planning if patient needs post-hospital services based on physician order or complex needs related to functional status, cognitive ability or social support system     Problem: Safety - Adult  Goal: Free from fall injury  Outcome: Progressing

## 2022-11-09 NOTE — PROGRESS NOTES
ACUTE OCCUPATIONAL THERAPY GOALS:   (Developed with and agreed upon by patient and/or caregiver.)  1. Patient will complete lower body bathing and dressing with MOD I and adaptive equipment as needed. 2.Patient will complete upper body bathing and dressing with MOD I and adaptive equipment as needed. 3. Patient will complete toileting with MOD I.   4. Patient will tolerate 30 minutes of OT treatment with 1-2 rest breaks to increase activity tolerance for ADLs. 5. Patient will complete functional transfers with MOD I and adaptive equipment as needed. 6. Patient will complete simple grooming task standing at the sink MOD I. Timeframe: 7 visits      OCCUPATIONAL THERAPY Initial Assessment and Daily Note       OT Visit Days: 1  Acknowledge Orders  Time  OT Charge Capture  Rehab Caseload Tracker      Gaylord Romberg is a 75 y.o. male   PRIMARY DIAGNOSIS: Acute pulmonary embolism, unspecified pulmonary embolism type, unspecified whether acute cor pulmonale present (HCC)  Shortness of breath [R06.02]  Other acute pulmonary embolism without acute cor pulmonale (HCC) [I26.99]  Acute pulmonary embolism, unspecified pulmonary embolism type, unspecified whether acute cor pulmonale present ([facility] [address] [I26.99]       Reason for Referral: Generalized Muscle Weakness (M62.81)  Other lack of cordination (R27.8)  Difficulty in walking, Not elsewhere classified (R26.2)  Inpatient: Payor: Milind Cordial / Plan: [employer] / Product Type: *No Product type* /     ASSESSMENT:     REHAB RECOMMENDATIONS:   Recommendation to date pending progress:  Setting:  Home Health Therapy    Equipment:    Rolling Walker     ASSESSMENT:  Mr. Neeta Mast presented to the hospital with sudden onset of SOB--pt found to have bilateral Pes and a RLE DVT. Pt is now s/p a bilateral PA thrombectomy on 11/8. At baseline, pt is independent for his ADLs and IADLs. Today, pt was received supine in bed.  Completed bed mobility, LB dressing, functional transfers, ambulation with RW, and grooming tasks standing at the sink with overall CGA. Pt on room and with O2 sats remaining in the 90s throughout session. Pt's HR did increased to the 140s with minimal activity--RN and MD aware. Recommend pt return home with [facility] therapy once medically stable. Xuan Kaur currently demonstrates overall deficits in strength, balance, activity tolerance, and ADL performance. Patient would benefit from skilled OT services at this time in order to address functional deficits and OT goals stated above. [address] [facility] [address] [address] AM-PAC 6 Clicks Daily Activity Inpatient Short Form:    AM-PAC Daily Activity Inpatient   How much help for putting on and taking off regular lower body clothing?: A Little  How much help for Bathing?: A Little  How much help for Toileting?: A Little  How much help for putting on and taking off regular upper body clothing?: A Little  How much help for taking care of personal grooming?: A Little  How much help for eating meals?: None  [facility] Inpatient Daily Activity Raw Score: 19  AM-PAC Inpatient ADL T-Scale Score : 40.22  ADL Inpatient CMS 0-100% Score: 42.8  ADL Inpatient CMS G-Code Modifier : CK      SUBJECTIVE:     Mr. Constantin Samuels states, \"I just feel weak. \"     Social/Functional Lives With: Spouse  Home Layout: One level  Home Access: Stairs to enter with rails  Entrance Stairs - Number of Steps: 5  Ambulation Assistance: Independent  Transfer Assistance: Independent  Active : Yes    OBJECTIVE:     Memo Ogden / Courtney Espazra / Mulu Callas: NA    RESTRICTIONS/PRECAUTIONS:       PAIN: VITALS / O2:   Pre Treatment:   Pain Assessment: None - Denies Pain      Post Treatment: no change        Vitals          Oxygen            GROSS EVALUATION: INTACT IMPAIRED   (See Comments)   UE AROM [x] []   UE PROM [x] []   Strength []  Functional for bADLs, generalized weakness noted throughout      Posture / Balance []  Fair+ sitting and standing    Sensation [x] Coordination []       Tone [x]       Edema [x]    Activity Tolerance []  Increased HR with minimal activity (140s), RN and MD aware     Hand Dominance R [] L []      COGNITION/  PERCEPTION: INTACT IMPAIRED   (See Comments)   Orientation [x]     Vision [x]     Hearing [x]     Cognition  [x]     Perception [x]       MOBILITY: I Mod I S SBA CGA Min Mod Max Total  NT x2 Comments:   Bed Mobility    Rolling [] [] [] [] [] [] [] [] [] [x] []    Supine to Sit [] [] [] [] [x] [] [] [] [] [] []    Scooting [] [] [] [] [x] [] [] [] [] [] []    Sit to Supine [] [] [] [] [x] [] [] [] [] [] []    Transfers    Sit to Stand [] [] [] [] [x] [] [] [] [] [] []    Bed to Chair [] [] [] [] [] [] [] [] [] [x] [] EKG at bedside following session   Stand to Sit [] [] [] [] [x] [] [] [] [] [] []    Tub/Shower [] [] [] [] [] [] [] [] [] [x] []     Toilet [] [] [] [] [] [] [] [] [] [x] []      [] [] [] [] [] [] [] [] [] [] []    I=Independent, Mod I=Modified Independent, S=Supervision/Setup, SBA=Standby Assistance, CGA=Contact Guard Assistance, Min=Minimal Assistance, Mod=Moderate Assistance, Max=Maximal Assistance, Total=Total Assistance, NT=Not Tested    ACTIVITIES OF DAILY LIVING: I Mod I S SBA CGA Min Mod Max Total NT Comments   BASIC ADLs:              Upper Body Bathing  [] [] [] [] [] [] [] [] [] [x]    Lower Body Bathing [] [] [] [] [] [] [] [] [] [x]    Toileting [] [] [] [] [] [] [] [] [] [x]    Upper Body Dressing [] [] [] [] [] [] [] [] [] [x]    Lower Body Dressing [] [] [] [] [x] [] [] [] [] [] shoes   Feeding [] [] [] [] [] [] [] [] [] [x]    Grooming [] [] [] [] [x] [] [] [] [] [] Washed face standing at the sink   Personal Device Care [] [] [] [] [] [] [] [] [] [x]    Functional Mobility [] [] [] [] [x] [] [] [] [] [] RW   I=Independent, Mod I=Modified Independent, S=Supervision/Setup, SBA=Standby Assistance, CGA=Contact Guard Assistance, Min=Minimal Assistance, Mod=Moderate Assistance, Max=Maximal Assistance, Total=Total Assistance, NT=Not Tested    PLAN:   FREQUENCY/DURATION   OT Plan of Care: 3 times/week for duration of hospital stay or until stated goals are met, whichever comes first.    PROBLEM LIST:   (Skilled intervention is medically necessary to address:)  Decreased ADL/Functional Activities  Decreased Activity Tolerance  Decreased Balance  Decreased Coordination  Decreased Safety Awareness  Decreased Strength  Decreased Transfer Abilities   INTERVENTIONS PLANNED:  (Benefits and precautions of occupational therapy have been discussed with the patient.)  Self Care Training  Therapeutic Activity  Therapeutic Exercise/HEP  Neuromuscular Re-education  Manual Therapy  Education         TREATMENT:     EVALUATION: LOW COMPLEXITY: (Untimed Charge)    TREATMENT:   Co-Treatment PT/OT necessary due to patient's decreased overall endurance/tolerance levels, as well as need for high level skilled assistance to complete functional transfers/mobility and functional tasks  Self Care (10 minutes): Patient participated in lower body dressing and grooming ADLs in unsupported sitting and standing with no verbal cueing to increase independence, decrease assistance required, increase activity tolerance, and increase safety awareness. Patient also participated in functional mobility, functional transfer, and energy conservation training to increase independence, decrease assistance required, increase activity tolerance, and increase safety awareness. TREATMENT GRID:  N/A    AFTER TREATMENT PRECAUTIONS: Bed, Call light within reach, Needs within reach, RN notified, and Visitors at bedside    INTERDISCIPLINARY COLLABORATION:  RN/ PCT, PT/ PTA, and OT/ WELDON    EDUCATION:  Education Given To: Patient; Family  Education Provided: Role of Therapy;Plan of Care; Fall Prevention Strategies  Education Outcome: Verbalized understanding;Demonstrated understanding    TOTAL TREATMENT DURATION AND TIME:  Time In: 1331  Time Out: [address]  Minutes: Lavinia Saeed, [place]

## 2022-11-09 NOTE — PROGRESS NOTES
Discharge held due to tachycardia and dizziness with ambulation.  ECHO done 11-8-22 EF 45-50%, monitor on tele, ordered EKG  Alhaji Singh MD

## 2022-11-09 NOTE — DISCHARGE SUMMARY
Hospitalist Discharge Summary   Admit Date:  2022  3:59 PM   DC Note date: 2022  Name:  John Blandon   Age:  75 y.o. Sex:  male  :  1955   MRN:  [id number]   Room:  [facility]  PCP:  NOT ON FILE    Presenting Complaint: Shortness of Breath     Initial Admission Diagnosis: Shortness of breath [R06.02]  Other acute pulmonary embolism without acute cor pulmonale ([facility]) [I26.99]  Acute pulmonary embolism, unspecified pulmonary embolism type, unspecified whether acute cor pulmonale present ([facility] [address] [I26.99]     Problem List for this Hospitalization (present on admission):    Principal Problem:    Acute pulmonary embolism, unspecified pulmonary embolism type, unspecified whether acute cor pulmonale present ([facility] [address]  Active Problems:    PAD (peripheral artery disease) ([facility] [address]    Atherosclerosis of both carotid arteries    Obesity (BMI 35.0-39.9 without comorbidity)    Essential hypertension    Coronary artery disease involving native coronary artery of native heart without angina pectoris    Mixed hyperlipidemia  Resolved Problems:    * No resolved hospital problems. [facility] Course:    Mr. Hardeep Veras is a  74 y/o WM with a h/o ABIGAIL, PAD, HTN, obesity, CAD, HLD who was admitted  on 22 with bilateral PE/ RLE DVT. Recently resumed on testosterone supplementation. Also noted he is not very active due to chronic back pain and smokes cigars. No prior VTE. CT chest with large bilateral PE. He was started on a heparin drip. IR was consulted and he underwent bilateral PA thrombectomy on . US with RLE DVT but no intervention required for this per IR.  transitioned to [facility] on 22. He will have hematology referral at discharge. He is given albuterol/ spiriva due to smoking history and advised cessation. He has a mild anemia/ thrombocytopenia that will need CBC followup. Discharge plans are to home. Disposition: home         Diet: ADULT DIET;  Regular; Low Fat/Low Chol/High Fiber/YOVANA  Code Status: Full Code    Follow Ups:   Follow-up Information     [facility] Follow up on 12/9/2022. Specialty: Oncology  Why: 9 am  Contact information:  [phone] [id number] [phone]                     Time spent in patient discharge and coordination 35  minutes. Follow up labs/diagnostics (ultimately defer to outpatient provider):      CBC    Plan was discussed with patient . All questions answered. Patient was stable at time of discharge. Instructions given to call a physician or return if any concerns. Current Discharge Medication List        START taking these medications    Details   tiotropium (SPIRIVA RESPIMAT) 2.5 MCG/ACT AERS inhaler Inhale 2 puffs into the lungs daily  Qty: 1 each, Refills: 0      albuterol sulfate HFA (PROVENTIL;VENTOLIN;PROAIR) 108 (90 Base) MCG/ACT inhaler Inhale 2 puffs into the lungs every 4 hours as needed for Wheezing or Shortness of Breath  Qty: 1 each, Refills: 0      apixaban (ELIQUIS) 5 MG TABS tablet Take 10 mg every 12 hours for 7 days (last dose on 11-15-22 at 9 PM), then decrease to 5 mg every 12 hours on 11-16-22 at 9 AM  Qty: 60 tablet, Refills: 0           CONTINUE these medications which have NOT CHANGED    Details   diazePAM (VALIUM) 5 MG tablet Take one a day  Qty: 30 tablet, Refills: 3    Associated Diagnoses: Seizure ([facility] [address]      aspirin 81 MG EC tablet Take 81 mg by mouth daily      atorvastatin (LIPITOR) 80 MG tablet TAKE 1 TABLET EVERY NIGHT      ezetimibe (ZETIA) 10 MG tablet Take 10 mg by mouth daily      traMADol (ULTRAM) 50 MG tablet Take 50 mg by mouth every 6 hours as needed.            STOP taking these medications       Testosterone Cypionate 200 MG/ML SOLN Comments:   Reason for Stopping:         lisinopril (PRINIVIL;ZESTRIL) 20 MG tablet Comments:   Reason for Stopping:         naproxen sodium (ANAPROX) 220 MG tablet Comments:   Reason for Stopping: rivaroxaban (XARELTO) 2.5 MG TABS tablet Comments:   Reason for Stopping:               Procedures done this admission:  * No surgery found *    Consults this admission:  IP CONSULT TO INTERVENTIONAL RADIOLOGY    Echocardiogram results:  11/07/22    TRANSTHORACIC ECHOCARDIOGRAM (TTE) COMPLETE (CONTRAST/BUBBLE/3D PRN) 11/08/2022  4:45 PM (Final)    Interpretation Summary    Left Ventricle: Mildly reduced left ventricular systolic function with a visually estimated EF of 45 - 50%. Left ventricle size is normal. Normal wall thickness. Mild global hypokinesis present. Abnormal diastolic function. Right Ventricle: Right ventricle is mildly dilated. Mildly reduced systolic function. Aortic Valve: Tricuspid valve. Mild sclerosis of the aortic valve cusp. Mitral Valve: Mild regurgitation. Technical qualifiers: Color flow Doppler was performed and pulse wave and/or continuous wave Doppler was performed. Contrast used: Definity. Signed by: Bibi Huntley MD on 11/8/2022  4:45 PM      Diagnostic Imaging/Tests:   XR CHEST PORTABLE    Result Date: 11/7/2022  No consolidation. CT CHEST PULMONARY EMBOLISM W CONTRAST    Result Date: 11/7/2022  1. Acute large bilateral pulmonary emboli. 2. Small right peripheral lung pulmonary infarctions. 3. Coronary artery and aortic calcifications. 4. Probable COPD. DC5 The critical results contained in this report were communicated to Dr. Gail Bean by Dr Jase Barnard on the phone at 5:30 PM. Critical results were communicated as outlined in Section II.C.2.a.i of the ACR Practice Guideline for Communication of Diagnostic Imaging Findings. IR GUIDED THROMB DA\[facility]\"" VEIN    Result Date: 11/8/2022  Venography demonstrates no thrombus in the right common iliac vein or lower inferior vena cava. Pulmonary arteriography after thrombectomy demonstrates a small, nonocclusive, thrombus in one of the left lower lobe pulmonary artery segmental branches.   Final right pulmonary angiography demonstrates no residual thrombus. Plan:  2 hours bedrest.  Transition from heparin infusion to oral anticoagulation later tonight or tomorrow. Lower extremity venous ultrasound was performed after this procedure. Duplex ultrasound demonstrates occlusive thrombus in the right femoral, popliteal, posterior tibial, and peroneal veins. However, the patient has no right lower extremity swelling. Recommend minimum 6 months oral anticoagulation. _______________________________________________________________ PROCEDURE SUMMARY: - Venous access with ultrasound guidance - Pelvic venography. - Bilateral main pulmonary angiography - Superselective pulmonary angiography: Right superior trunk pulmonary angiography - Pulmonary arterial interventions as described below - Additional procedure(s): Inari FlowSaver with immediate blood return PROCEDURE DETAILS: Pre-procedure Consent:  Informed written and oral consent for the procedure was obtained after explanation of risks (including, but not limitted to:  hemorrhage, vascular injury, infection) benefits, and alternatives. The patient's questions were answered to his/her satisfaction. He/She stated understanding and requested that we proceed. Final verification:  A time-out identifying the patient and planned procedure was performed prior to this procedure. Preparation (MIPS):  Maximal sterile barrier technique (including:  Sterile Ultrasound probe cover, Sterile Ultrasound gel, cap, mask, Sterile Gown, Sterile Gloves, Sterile Drape, hand hygiene, and 2% chlorhexidine cutaneous antisepsis) was used. Anesthesia/sedation Level of anesthesia/sedation:  Moderate sedation (conscious sedation) Anesthesia/sedation administered by: Independent trained observer under attending supervision with continuous monitoring of the patient?s level of consciousness and physiologic status Total intra-service sedation time (minutes):  110 Access Local anesthesia was administered.   The right great saphenous vein insertion and common femoral vein was sonographically evaluated and determined to be patent. Real time ultrasound was used to visualize needle entry into the vessel and a permanent image was stored. Initially, an 8-Ugandan sheath was placed. Eventually, 24 Fr Inari Sheath sheath was placed. Vein accessed:  Right Great Saphenous Vein Access technique:  Micropuncture set with 21 gauge needle Pelvic venography The pelvic venous system was catheterized with an 8-Ugandan sheath. Indication for angiography:  Diagnostic angiography - There was no prior catheter-based angiographic study available and a full diagnostic study was performed. The decision to intervene was based on the diagnostic study. Concern for right pelvic thrombus given the recent pulmonary embolism. Vessel catheterized:  Right external iliac vein Findings:  Widely patent right external iliac vein and common iliac vein. No thrombus in the peripheral most portion of the inferior vena cava. Some contrast does reflux into the left common iliac vein, demonstrating patency. Multiple catheters and wires were utilized in an attempt to traverse the right heart and access the pulmonary outflow tract. 2  right ventriculograms were obtained in order to confirm appropriate location and to exclude a right ventricular thrombus. Right ventriculogram Vessel catheterized:  Right ventricle was accessed with a pigtail catheter Findings:  No right ventricle thrombus identified. Sluggish flow through the pulmonary outflow tract and into the main right and left pulmonary arteries. Eventually, a Bentson wire was successfully passed through the right heart using a 6-Ugandan  catheter. The  catheter was removed. An occlusion balloon catheter was advanced to the atriocaval junction. The balloon was inflated with 8 cc of air and then gently advanced over the Bentson wire into the left main pulmonary artery.   The balloon catheter met no resistance, confirming that the wire passed was safe and appropriate without in tangle and a chordee tendinae. The Bentson wire was replaced with an exchange length Amplatz wire with the tip positioned in a left lower lobe pulmonary artery. Due to the patient's worsening hypoxia, pulmonary angiography was not performed at this time. Instead, the Triever 24 was quickly advanced and thrombectomy was performed. Mechanical Thrombectomy/Aspiration Thrombectomy location : Left lower lobe and left main pulmonary artery Thrombectomy device:  Inari FlowTriever 24 Thrombectomy type:  Manual Aspiration A small volume of acute thrombus was returned. Post-intervention angiography:  Vessel catheterized:  Left main pulmonary artery Findings:  Excellent anterograde flow into the upper lobe, lingula, and lower lobe with the exception of one segmental branch in the lower lobe. Due to its distal location, and the patient's worsening hypoxia, heart rate, and blood pressure I decided to forego any further left lung treatment and to focus on the right lung. Using the exchange length Amplatz wire and the super pigtail catheter, the right lower lobe pulmonary artery was accessed. The pigtail catheter was removed, the dilator was replaced and the Triever 24 for catheter was quickly advanced into the right lower lobe pulmonary artery. Mechanical thrombectomy was then immediately performed. Mechanical thrombectomy location : Right lower lobe and main pulmonary artery Mechanical thrombectomy device:  Inari FlowTriever 24 Mechanical thrombectomy type:  Manual Aspiration Large volume of acute and chronic thrombus was returned. Once no additional thrombus could be aspirated, a right lower lobe pulmonary arteriogram was performed. Vessel catheterized:  Right lower lobe pulmonary artery Findings:  Excellent flow in the right lower lobe and right middle lobe pulmonary artery branches.   Although incompletely visualized, there appeared to be occlusive thrombus in the right upper lobe pulmonary artery. The [place] Ari was carefully retracted until the tip intubated the superior trunk pulmonary artery. The Amplatz wire was gently advanced and the aspiration catheter was seated into the superior trunk. Vessel catheterized:  Superior trunk, right pulmonary artery Findings:  Occlusive thrombus in the right upper lobe pulmonary artery. Once again, aspiration thrombectomy was performed. Mechanical Thrombectomy/Aspiration Thrombectomy location : Right upper lobe pulmonary artery Thrombectomy device:  Inari FlowTriever 24 Thrombectomy type:  Manual Aspiration A sizable thrombus was returned. After this, a final right upper lobe pulmonary arteriogram was performed. Vessel catheterized:  Right upper lobe pulmonary artery Findings:  No residual thrombus identified in the right upper lobe pulmonary artery distribution. Reflux into the main pulmonary artery again confirms its patency with excellent flow into the right middle lobe and lower lobe pulmonary artery branches. At this point, the [id number] and wire were removed. Blood Conservation:  Fair Zandra was utilized after every aspiration event. Closure The sheath was removed. Hemostasis was achieved with a pursestring suture and Inari FlowStasis device. A sterile dressing was applied. Contrast Contrast agent:  Isovue-300 Contrast volume (mL):  150 Radiation Dose Reference Air Kerma (Haroon Ray):  2138 mGy Dose Area Product/Kerma Area Product (DAP/ORLANDO/PKA): 47,660.26cGy-cm2 Fluoroscopy Exposure Time:  25 minutes 18 seconds     Fluorographic Images:  4 pulmonary arteriograms, 1 right ventriculogram, 1 pelvic venogram Additional Details Equipment details:  None Specimens removed:  None Estimated blood loss (mL):  11-50 Standardized report: [email or web address] Attestation Signer name: Juan Velasquez M.D. I attest that I performed the entire procedure.  I reviewed the stored images and agree with the report as written. Vascular duplex lower extremity venous bilateral    Result Date: 11/8/2022  Occlusive DVT in the right femoral, popliteal, posterior tibial and peroneal veins.         Labs: Results:       BMP, Mg, Phos Recent Labs     11/07/22 1419 11/08/22 0321 11/09/22 0432    139 141   K 4.9 3.8 3.9    110 111*   CO2 23 24 24   ANIONGAP 5 5 6   BUN 29* 24* 16   CREATININE 1.30 1.00 0.90   LABGLOM 60* >60 >60   CALCIUM 10.3 8.9 8.6   GLUCOSE 131* 105* 90   MG  --  2.0  --       CBC Recent Labs     11/07/22 1419 11/08/22 0321 11/09/22 0432   WBC 12.2* 11.5* 12.5*   RBC 5.10 4.36 3.95*   HGB 16.2 13.9 12.2*   HCT 48.0 41.3 38.1*   MCV 94.1 94.7 96.5   MCH 31.8 31.9 30.9   MCHC 33.8 33.7 32.0   RDW 13.8 13.9 13.9    130* 112*   MPV 9.7 10.8 10.6   NRBC 0.00 0.00 0.00   SEGS 78 77 77   LYMPHOPCT 15 16 15   EOSRELPCT 0* 0* 1   MONOPCT 6 6 6   BASOPCT 0 0 0   IMMGRAN 1 1 1   SEGSABS 9.5* 8.8* 9.6*   LYMPHSABS 1.9 1.8 1.9   EOSABS 0.0 0.0 0.1   MONOSABS 0.7 0.7 0.7   BASOSABS 0.0 0.0 0.0   ABSIMMGRAN 0.1 0.1 0.2      LFT Recent Labs     11/07/22 1419 11/08/22 0321   BILITOT 0.8 0.6   ALKPHOS 73 62   AST 48* 24   ALT 35 27   PROT 7.6 6.1*   LABALBU 3.3 2.7*   GLOB 4.3 3.4      Cardiac  Lab Results   Component Value Date/Time    NTPROBNP 4,319 11/07/2022 09:36 PM    TROPHS 1,087.4 11/07/2022 09:36 PM      Coags Lab Results   Component Value Date/Time    PROTIME 15.6 11/07/2022 06:33 PM    INR 1.2 11/07/2022 06:33 PM    APTT 167.5 11/07/2022 06:33 PM      A1c Lab Results   Component Value Date/Time    LABA1C 6.1 11/08/2022 03:21 AM     11/08/2022 03:21 AM      Lipids Lab Results   Component Value Date/Time    CHOL 148 06/02/2021 03:15 PM    LDLCALC 83 06/02/2021 03:15 PM    LABVLDL 29 03/09/2020 12:04 PM    HDL 32 06/02/2021 03:15 PM    TRIG 193 06/02/2021 03:15 PM      Thyroid  No results found for: TSHELE, PAR6BOL     Most Recent UA Lab Results   Component Value Date/Time GLUCOSEU Negative 11/07/2022 04:29 PM    BLOODU Negative 11/07/2022 04:29 PM        No results for input(s): CULTURE in the last 720 hours.     All Labs from Last 24 Hrs:  Recent Results (from the past 24 hour(s))   POCT Glucose    Collection Time: 11/08/22 12:04 PM   Result Value Ref Range    POC Glucose 104 (H) 65 - 100 mg/dL    Performed by: Mani    POCT Glucose    Collection Time: 11/08/22  4:16 PM   Result Value Ref Range    POC Glucose 99 65 - 100 mg/dL    Performed by: Rashad    Transthoracic echocardiogram (TTE) complete with contrast, bubble, strain, and 3D PRN    Collection Time: 11/08/22  4:26 PM   Result Value Ref Range    LV EDV A2C 86 mL    LV EDV A4C 113 mL    LV ESV A2C 50 mL    LV ESV A4C 64 mL    IVSd 1.0 0.6 - 1.0 cm    LVIDd 4.2 4.2 - 5.9 cm    LVIDs 3.3 cm    LVOT Diameter 2.0 cm    LVOT Mean Gradient 2 mmHg    LVOT VTI 9.6 cm    LVOT Peak Velocity 0.9 m/s    LVOT Peak Gradient 3 mmHg    LVPWd 1.0 0.6 - 1.0 cm    LV E' Lateral Velocity 7 cm/s    LV E' Septal Velocity 9 cm/s    LV Ejection Fraction A2C 42 %    LV Ejection Fraction A4C 43 %    EF BP 42 (A) 55 - 100 %    LVOT Area 3.1 cm2    LVOT SV 30.1 ml    LA Minor Axis 4.6 cm    LA Major [place] 4.9 cm    LA Area 2C 10.7 cm2    LA Area 4C 12.5 cm2    LA Volume 2C 20 18 - 58 mL    LA Volume 4C 26 18 - 58 mL    LA Volume BP 24 18 - 58 mL    LA Diameter 2.8 cm    AV Mean Velocity 0.9 m/s    AV Mean Gradient 4 mmHg    AV VTI 17.2 cm    AV Peak Velocity 1.3 m/s    AV Peak Gradient 7 mmHg    AV Area by VTI 1.8 cm2    AV Area by Peak Velocity 2.2 cm2    Aortic Root 3.1 cm    Ascending Aorta 3.3 cm    MV E Wave Deceleration Time 248.0 ms    MV A Velocity 1.20 m/s    MV E Velocity 0.58 m/s    MV Mean Gradient 2 mmHg    MV VTI 18.2 cm    MV Mean Velocity 0.7 m/s    MV Max Velocity 1.4 m/s    MV Peak Gradient 8 mmHg    MV Area by VTI 1.7 cm2    PV .0 ms    PV Max Velocity 0.7 m/s    PV Peak Gradient 2 mmHg    RVIDd 3.5 cm    RV Basal Dimension 3.9 cm    RV Free Wall Peak S' 10 cm/s    TAPSE 1.3 (A) 1.7 cm    Body Surface Area 2.13 m2    Fractional Shortening 2D 21 28 - 44 %    LV ESV Index A4C 31 mL/m2    LV EDV Index A4C 55 mL/m2    LV ESV Index A2C 24 mL/m2    LV EDV Index A2C 42 mL/m2    LVIDd Index 2.03 cm/m2    LVIDs Index 1.59 cm/m2    LV RWT Ratio 0.48     LV Mass 2D 137.2 88 - 224 g    LV Mass 2D Index 66.3 49 - 115 g/m2    MV E/A 0.48     E/E' Ratio (Averaged) 7.37     E/E' Lateral 8.29     E/E' Septal 6.44     LA Volume Index BP 12 (A) 16 - 34 ml/m2    LVOT Stroke Volume Index 14.6 mL/m2    LA Volume Index 2C 10 (A) 16 - 34 mL/m2    LA Volume Index 4C 13 (A) 16 - 34 mL/m2    LA Size Index 1.35 cm/m2    LA/AO Root Ratio 0.90     Ao Root Index 1.50 cm/m2    Ascending Aorta Index 1.59 cm/m2    AV Velocity Ratio 0.69     LVOT:AV VTI Index 0.56     LIZZY/BSA VTI 0.9 cm2/m2    LIZZY/BSA Peak Velocity 1.1 cm2/m2    MV:LVOT VTI Index 1.90    Anti-Xa, Unfractionated Heparin    Collection Time: 11/08/22  6:14 PM   Result Value Ref Range    Anti-XA Unfrac Heparin >1.1 (H) 0.3 - 0.7 IU/mL   POCT Glucose    Collection Time: 11/08/22  8:32 PM   Result Value Ref Range    POC Glucose 152 (H) 65 - 100 mg/dL    [facility]    CBC with Auto Differential    Collection Time: 11/09/22  4:32 AM   Result Value Ref Range    WBC 12.5 (H) 4.3 - 11.1 K/uL    RBC 3.95 (L) 4.23 - 5.6 M/uL    Hemoglobin 12.2 (L) 13.6 - 17.2 g/dL    Hematocrit 38.1 (L) 41.1 - 50.3 %    MCV 96.5 82 - 102 FL    MCH 30.9 26.1 - 32.9 PG    MCHC 32.0 31.4 - 35.0 g/dL    RDW 13.9 11.9 - 14.6 %    Platelets 216 (L) 154 - 450 K/uL    MPV 10.6 9.4 - 12.3 FL    nRBC 0.00 0.0 - 0.2 K/uL    Differential Type AUTOMATED      Seg Neutrophils 77 43 - 78 %    Lymphocytes 15 13 - 44 %    Monocytes 6 4.0 - 12.0 %    Eosinophils % 1 0.5 - 7.8 %    Basophils 0 0.0 - 2.0 %    Immature Granulocytes 1 0.0 - 5.0 %    Segs Absolute 9.6 (H) 1.7 - 8.2 K/UL    Absolute Lymph # 1.9 0.5 - 4.6 K/UL    Absolute Mono # 0.7 0.1 - 1.3 K/UL    Absolute Eos # 0.1 0.0 - 0.8 K/UL    Basophils Absolute 0.0 0.0 - 0.2 K/UL    Absolute Immature Granulocyte 0.2 0.0 - 0.5 K/UL   Basic Metabolic Panel w/ Reflex to MG    Collection Time: 11/09/22  4:32 AM   Result Value Ref Range    Sodium 141 133 - 143 mmol/L    Potassium 3.9 3.5 - 5.1 mmol/L    Chloride 111 (H) 101 - 110 mmol/L    CO2 24 21 - 32 mmol/L    Anion Gap 6 2 - 11 mmol/L    Glucose 90 65 - 100 mg/dL    BUN 16 8 - 23 MG/DL    Creatinine 0.90 0.8 - 1.5 MG/DL    Est, Glom Filt Rate >60 >60 ml/min/1.73m2    Calcium 8.6 8.3 - 10.4 MG/DL   Anti-Xa, Unfractionated Heparin    Collection Time: 11/09/22  4:32 AM   Result Value Ref Range    Anti-XA Unfrac Heparin 0.40 0.3 - 0.7 IU/mL   POCT Glucose    Collection Time: 11/09/22  8:20 AM   Result Value Ref Range    POC Glucose 96 65 - 100 mg/dL    Performed by: River        Allergies   Allergen Reactions    Methocarbamol Hives    Ciprofloxacin Nausea And Vomiting     Immunization History   Administered Date(s) Administered    Influenza, FLUARIX, FLULAVAL, Moo Antis (age 10 mo+) AND AFLURIA, (age 1 y+), PF, 0.5mL 11/01/2018, 09/10/2019       Recent Vital Data:  Patient Vitals for the past 24 hrs:   Temp Pulse Resp BP SpO2   11/09/22 0736 98.7 °F (37.1 °C) 98 17 (!) 137/91 96 %   11/09/22 0301 98.2 °F (36.8 °C) 95 18 120/83 93 %   11/08/22 2228 98.6 °F (37 °C) (!) 103 18 118/82 96 %   11/08/22 1909 98.1 °F (36.7 °C) (!) 120 18 (!) 149/95 93 %   11/08/22 1512 97.7 °F (36.5 °C) 89 18 115/87 97 %       Oxygen Therapy  SpO2: 96 %  Pulse via Oximetry: 96 beats per minute  Pulse Oximeter Device Mode: Intermittent  O2 Device: Nasal cannula  Skin Assessment: Clean, dry, & intact  O2 Flow Rate (L/min): 2 L/min  End Tidal CO2: 22 (%)  O2 Delivery Method: CO2 nasal cannula    Estimated body mass index is 33.05 kg/m² as calculated from the following:    Height as of this encounter: 5' 7\" (1.702 m).     Weight as of this encounter: 211 lb (95.7 kg). Intake/Output Summary (Last 24 hours) at 11/9/2022 1123  Last data filed at 11/9/2022 0549  Gross per 24 hour   Intake 610 ml   Output 400 ml   Net 210 ml         Physical Exam:    General:    Well nourished. No overt distress, pleasant   CV:   RRR. No m/r/g. No JVD, no edema   Lungs:   CTAB. No wheezing, rhonchi, or rales. Respirations even, unlabored  Abdomen:   Soft, nontender, nondistended. Extremities: Warm and dry. No cyanosis or clubbing. No edema. Skin:     No rashes. Normal coloration  Neuro:  grossly intact. Psych:  Normal mood and affect. Signed:  August Chow MD    Part of this note may have been written by using a voice dictation software. The note has been proof read but may still contain some grammatical/other typographical errors.

## 2022-11-09 NOTE — CARE COORDINATION
11/09/22 [address] Discharge None   [employer] Information Provided? No   Mode of Transport at Discharge Other (see comment)   Confirm Follow Up Transport Family   Condition of Participation: Discharge Planning   The Patient and/or Patient Representative was provided with a Choice of Provider? Patient   The Patient and/Or Patient Representative agree with the Discharge Plan? Yes   Freedom of Choice list was provided with basic dialogue that supports the patient's individualized plan of care/goals, treatment preferences, and shares the quality data associated with the providers? Yes   Patient requested a new pcp. CM placed referral to [facility]. CM did not identify any other discharge needs.

## 2022-11-09 NOTE — DISCHARGE INSTRUCTIONS
Pulmonary Embolism: Care Instructions  Overview     Pulmonary embolism is the sudden blockage of an artery in the lung. Blood clots in the deep veins of the leg or pelvis (deep vein thrombosis, or DVT) are the most common cause. These blood clots can travel to the lungs. Pulmonary embolism can be very serious. Because you have had one pulmonary embolism, you are at greater risk for having another one. But you can take steps to prevent another pulmonary embolism. You will probably take a prescription blood-thinning medicine to prevent blood clots. A blood thinner can stop a blood clot from growing larger and prevent new clots from forming. Follow-up care is a key part of your treatment and safety. Be sure to make and go to all appointments, and call your doctor if you are having problems. It's also a good idea to know your test results and keep a list of the medicines you take. How can you care for yourself at home? Take your medicines exactly as prescribed. Call your doctor if you think you are having a problem with your medicine. You will get more details on the specific medicines your doctor prescribes. If you are taking a blood thinner, be sure you get instructions about how to take your medicine safely. Blood thinners can cause serious bleeding problems. Try to walk several times a day. Walking helps keep blood moving in your legs. Before doing other types of exercise, ask your doctor what type and level of exercise is safe for you. Take steps to help prevent blood clots in your legs. For example:  Exercise your lower leg muscles if you sit for long periods of time. Pump your feet up and down by pulling your toes up toward your knees then pointing them down. Repeat. After an illness or surgery, try to get up and out of bed often. If you can't get out of bed, flex your feet every hour to keep the blood moving through your legs. Take plenty of breaks when you travel.  On long car trips, stop the car and walk around every hour or so. On the bus, plane, or train, get out of your seat and walk up and down the aisle every hour, if you can. Wear compression stockings if your doctor recommends them. Check with your doctor about whether you should use hormonal forms of birth control or hormone therapy. These may increase your risk of blood clots. Have a healthy lifestyle. This includes being active, staying at a healthy weight, and not smoking. When should you call for help? Call 911 anytime you think you may need emergency care. For example, call if:    You have shortness of breath. You have chest pain. You passed out (lost consciousness). You cough up blood. Call your doctor now or seek immediate medical care if:    You have new or worsening pain or swelling in your leg. Watch closely for changes in your health, and be sure to contact your doctor if:    You do not get better as expected. Where can you learn more? Go to https://kwiry.[employer]. org and sign in to your [employer] account. Enter E595 in the Bunch box to learn more about \"Pulmonary Embolism: Care Instructions. \"     If you do not have an account, please click on the \"Sign Up Now\" link. Current as of: March 28, 2022               Content Version: 13.4  © 2006-2022 [employer]. Care instructions adapted under license by [facility] ([facility]). If you have questions about a medical condition or this instruction, always ask your healthcare professional. [place] [address] any warranty or liability for your use of this information.

## 2022-11-10 PROBLEM — R42 DIZZINESS: Status: ACTIVE | Noted: 2022-11-10

## 2022-11-10 PROBLEM — R00.0 SINUS TACHYCARDIA: Status: ACTIVE | Noted: 2022-11-10

## 2022-11-10 LAB
BASOPHILS # BLD: 0 K/UL (ref 0–0.2)
BASOPHILS NFR BLD: 0 % (ref 0–2)
DIFFERENTIAL METHOD BLD: ABNORMAL
EOSINOPHIL # BLD: 0.1 K/UL (ref 0–0.8)
EOSINOPHIL NFR BLD: 0 % (ref 0.5–7.8)
ERYTHROCYTE [DISTWIDTH] IN BLOOD BY AUTOMATED COUNT: 13.8 % (ref 11.9–14.6)
GLUCOSE BLD STRIP.AUTO-MCNC: 110 MG/DL (ref 65–100)
GLUCOSE BLD STRIP.AUTO-MCNC: 111 MG/DL (ref 65–100)
GLUCOSE BLD STRIP.AUTO-MCNC: 113 MG/DL (ref 65–100)
GLUCOSE BLD STRIP.AUTO-MCNC: 136 MG/DL (ref 65–100)
HCT VFR BLD AUTO: 35.5 % (ref 41.1–50.3)
HGB BLD-MCNC: 11.7 G/DL (ref 13.6–17.2)
IMM GRANULOCYTES # BLD AUTO: 0.1 K/UL (ref 0–0.5)
IMM GRANULOCYTES NFR BLD AUTO: 1 % (ref 0–5)
LYMPHOCYTES # BLD: 1.6 K/UL (ref 0.5–4.6)
LYMPHOCYTES NFR BLD: 13 % (ref 13–44)
MCH RBC QN AUTO: 31.6 PG (ref 26.1–32.9)
MCHC RBC AUTO-ENTMCNC: 33 G/DL (ref 31.4–35)
MCV RBC AUTO: 95.9 FL (ref 82–102)
MONOCYTES # BLD: 0.8 K/UL (ref 0.1–1.3)
MONOCYTES NFR BLD: 7 % (ref 4–12)
NEUTS SEG # BLD: 9.4 K/UL (ref 1.7–8.2)
NEUTS SEG NFR BLD: 79 % (ref 43–78)
NRBC # BLD: 0 K/UL (ref 0–0.2)
PLATELET # BLD AUTO: 139 K/UL (ref 150–450)
PMV BLD AUTO: 10.8 FL (ref 9.4–12.3)
RBC # BLD AUTO: 3.7 M/UL (ref 4.23–5.6)
SERVICE CMNT-IMP: ABNORMAL
WBC # BLD AUTO: 12.1 K/UL (ref 4.3–11.1)

## 2022-11-10 PROCEDURE — 94640 AIRWAY INHALATION TREATMENT: CPT

## 2022-11-10 PROCEDURE — 36415 COLL VENOUS BLD VENIPUNCTURE: CPT

## 2022-11-10 PROCEDURE — 1100000003 HC PRIVATE W/ TELEMETRY

## 2022-11-10 PROCEDURE — 94760 N-INVAS EAR/PLS OXIMETRY 1: CPT

## 2022-11-10 PROCEDURE — 2580000003 HC RX 258: Performed by: FAMILY MEDICINE

## 2022-11-10 PROCEDURE — 6370000000 HC RX 637 (ALT 250 FOR IP): Performed by: INTERNAL MEDICINE

## 2022-11-10 PROCEDURE — 82962 GLUCOSE BLOOD TEST: CPT

## 2022-11-10 PROCEDURE — 6370000000 HC RX 637 (ALT 250 FOR IP): Performed by: FAMILY MEDICINE

## 2022-11-10 PROCEDURE — 85025 COMPLETE CBC W/AUTO DIFF WBC: CPT

## 2022-11-10 PROCEDURE — 6370000000 HC RX 637 (ALT 250 FOR IP): Performed by: NURSE PRACTITIONER

## 2022-11-10 PROCEDURE — 99223 1ST HOSP IP/OBS HIGH 75: CPT | Performed by: INTERNAL MEDICINE

## 2022-11-10 RX ORDER — METOPROLOL SUCCINATE 25 MG/1
25 TABLET, EXTENDED RELEASE ORAL DAILY
Status: DISCONTINUED | OUTPATIENT
Start: 2022-11-10 | End: 2022-11-10

## 2022-11-10 RX ORDER — METOPROLOL SUCCINATE 25 MG/1
12.5 TABLET, EXTENDED RELEASE ORAL 2 TIMES DAILY
Status: DISCONTINUED | OUTPATIENT
Start: 2022-11-10 | End: 2022-11-11 | Stop reason: HOSPADM

## 2022-11-10 RX ORDER — DOXYCYCLINE HYCLATE 100 MG/1
100 CAPSULE ORAL EVERY 12 HOURS SCHEDULED
Qty: 9 CAPSULE | Refills: 0 | Status: SHIPPED | OUTPATIENT
Start: 2022-11-10 | End: 2022-11-15

## 2022-11-10 RX ORDER — DOXYCYCLINE HYCLATE 100 MG/1
100 CAPSULE ORAL EVERY 12 HOURS SCHEDULED
Status: DISCONTINUED | OUTPATIENT
Start: 2022-11-10 | End: 2022-11-11 | Stop reason: HOSPADM

## 2022-11-10 RX ADMIN — ACETAMINOPHEN 650 MG: 325 TABLET ORAL at 19:52

## 2022-11-10 RX ADMIN — APIXABAN 10 MG: 5 TABLET, FILM COATED ORAL at 08:55

## 2022-11-10 RX ADMIN — METOPROLOL SUCCINATE 12.5 MG: 25 TABLET, EXTENDED RELEASE ORAL at 14:21

## 2022-11-10 RX ADMIN — ASPIRIN 81 MG: 81 TABLET ORAL at 08:55

## 2022-11-10 RX ADMIN — METOPROLOL SUCCINATE 12.5 MG: 25 TABLET, EXTENDED RELEASE ORAL at 21:59

## 2022-11-10 RX ADMIN — SODIUM CHLORIDE, PRESERVATIVE FREE 10 ML: 5 INJECTION INTRAVENOUS at 22:00

## 2022-11-10 RX ADMIN — FAMOTIDINE 20 MG: 20 TABLET, FILM COATED ORAL at 21:59

## 2022-11-10 RX ADMIN — DOXYCYCLINE HYCLATE 100 MG: 100 CAPSULE ORAL at 21:59

## 2022-11-10 RX ADMIN — DIAZEPAM 5 MG: 5 TABLET ORAL at 08:55

## 2022-11-10 RX ADMIN — FAMOTIDINE 20 MG: 20 TABLET, FILM COATED ORAL at 08:55

## 2022-11-10 RX ADMIN — EZETIMIBE 10 MG: 10 TABLET ORAL at 21:59

## 2022-11-10 RX ADMIN — DOXYCYCLINE HYCLATE 100 MG: 100 CAPSULE ORAL at 08:55

## 2022-11-10 RX ADMIN — TIOTROPIUM BROMIDE INHALATION SPRAY 2 PUFF: 3.12 SPRAY, METERED RESPIRATORY (INHALATION) at 09:38

## 2022-11-10 RX ADMIN — APIXABAN 10 MG: 5 TABLET, FILM COATED ORAL at 21:59

## 2022-11-10 RX ADMIN — ATORVASTATIN CALCIUM 80 MG: 40 TABLET, FILM COATED ORAL at 21:59

## 2022-11-10 RX ADMIN — SODIUM CHLORIDE, PRESERVATIVE FREE 10 ML: 5 INJECTION INTRAVENOUS at 08:55

## 2022-11-10 ASSESSMENT — ENCOUNTER SYMPTOMS
WHEEZING: 0
BACK PAIN: 0
COLOR CHANGE: 0
DIARRHEA: 0
BLOATING: 0
BLURRED VISION: 0
COUGH: 1
NAUSEA: 0
DOUBLE VISION: 0
NAIL CHANGES: 0
SHORTNESS OF BREATH: 1
ABDOMINAL PAIN: 0

## 2022-11-10 NOTE — PROGRESS NOTES
Hospitalist Progress Note   Admit Date:  2022  3:59 PM   Name:  Nicolás Barbosa   Age:  75 y.o. Sex:  male  :  1955   MRN:  [id number]   Room:  830/    Presenting Complaint: Shortness of Breath     Reason(s) for Admission: Shortness of breath [R06.02]  Other acute pulmonary embolism without acute cor pulmonale (HCC) [I26.99]  Acute pulmonary embolism, unspecified pulmonary embolism type, unspecified whether acute cor pulmonale present [facility]) [I26.99]     Hospital Course:       Mr. Jed Wang is a  76 y/o WM with a h/o  PAD, HTN, obesity, CAD, HLD who was admitted  on 22 with bilateral PE/ RLE DVT. Recently resumed on testosterone supplementation. Also noted he is not very active due to chronic back pain and smokes cigars. No prior VTE. CT chest with large bilateral PE. He was started on a heparin drip. IR was consulted and he underwent bilateral PA thrombectomy on . US with RLE DVT but no intervention required for this per IR.  transitioned to Eliquis on 22. He will have hematology referral at discharge. He is given albuterol/ spiriva due to smoking history and advised cessation. He has a mild anemia/ thrombocytopenia that will need CBC followup. He developed dizziness and sinus tachycardia into 140s with exertion. [address]  Result status: Final result       Left Ventricle: Mildly reduced left ventricular systolic function with a visually estimated EF of 45 - 50%. Left ventricle size is normal. Normal wall thickness. Mild global hypokinesis present. Abnormal diastolic function. Right Ventricle: Right ventricle is mildly dilated. Mildly reduced systolic function. Aortic Valve: Tricuspid valve. Mild sclerosis of the aortic valve cusp. Mitral Valve: Mild regurgitation. Technical qualifiers: Color flow Doppler was performed and pulse wave and/or continuous wave Doppler was performed. Contrast used: Definity  \"    Flu/covid19 negative.        Discharge plans are to home.       Subjective & 24hr Events (11/10/22): .4, smokes cigars, some congestion,  no dyspnea, not getting up too much, ate, no diarrhea         Assessment & Plan:     Principal Problem:    Acute pulmonary embolism, unspecified pulmonary embolism type, unspecified whether acute cor pulmonale present ([address]  Plan:   Eliquis  Hematology outpatient   Stop testosterone         Active Problems:    Sinus tachycardia  Plan:   Due to PE, deconditioning  Not on rate controlling meds   Ask cardio to evaluate prior to discharge           Dizziness  Plan:   Improving  PT,OT          Obesity (BMI 35.0-39.9 without comorbidity)  Plan:   Needs modification            Coronary artery disease involving native coronary artery of native heart without angina pectoris  Plan:     Mixed hyperlipidemia  Plan: On asa, lipitor           Fever:  Possibly PE related  Covid and flu negative  Doxycycline D 1 /5 added due to respiratory complaints         HFrEF:  Cardiology consult  No current meds      Anemia  Thrombocytopenia:  Trend CBC      Anticipated discharge needs:      Pending     Diet:  ADULT DIET; Regular; Low Fat/Low Chol/High Fiber/YOVANA  DVT PPx: eliquis  Code status: Full Code    Hospital Problems:  Principal Problem:    Acute pulmonary embolism, unspecified pulmonary embolism type, unspecified whether acute cor pulmonale present (HCC)  Active Problems:    Sinus tachycardia    Dizziness    PAD (peripheral artery disease) ([facility])    Atherosclerosis of both carotid arteries    Obesity (BMI 35.0-39.9 without comorbidity)    Essential hypertension    Coronary artery disease involving native coronary artery of native heart without angina pectoris    Mixed hyperlipidemia  Resolved Problems:    * No resolved hospital problems.  *      Objective:   Patient Vitals for the past 24 hrs:   Temp Pulse Resp BP SpO2   11/10/22 1031 99.1 °F (37.3 °C) (!) 107 18 118/79 96 %   11/10/22 0940 -- 75 18 -- 93 %   11/10/22 0737 99.9 °F (37.7 °C) (!) 106 20 124/87 91 %   11/10/22 0415 -- 89 -- -- --   11/10/22 0338 99 °F (37.2 °C) 95 20 130/78 94 %   11/10/22 0030 99.5 °F (37.5 °C) -- -- -- --   11/09/22 2250 100.4 °F (38 °C) (!) 107 20 (!) 141/84 91 %   11/09/22 2041 -- (!) 111 -- -- --   11/09/22 2000 99.5 °F (37.5 °C) (!) 112 20 123/81 94 %   11/09/22 1506 99.5 °F (37.5 °C) (!) 117 18 128/82 92 %   11/09/22 1345 -- (!) 146 -- -- --   11/09/22 1338 -- -- 20 -- --       Oxygen Therapy  SpO2: 96 %  Pulse via Oximetry: 96 beats per minute  Pulse Oximeter Device Mode: Intermittent  O2 Device: None (Room air)  Skin Assessment: Clean, dry, & intact  O2 Flow Rate (L/min): 2 L/min  End Tidal CO2: 22 (%)  O2 Delivery Method: CO2 nasal cannula    Estimated body mass index is 33.05 kg/m² as calculated from the following:    Height as of this encounter: 5' 7\" (1.702 m). Weight as of this encounter: 211 lb (95.7 kg). Intake/Output Summary (Last 24 hours) at 11/10/2022 1329  Last data filed at 11/10/2022 0830  Gross per 24 hour   Intake 360 ml   Output 400 ml   Net -40 ml         Physical Exam:     Blood pressure 118/79, pulse (!) 107, temperature 99.1 °F (37.3 °C), temperature source Oral, resp. rate 18, height 5' 7\" (1.702 m), weight 211 lb (95.7 kg), SpO2 96 %. General:    Well nourished. Alert, no distress, pleasant   CV:   RRR. No m/r/g. No jugular venous distension. No edema   Lungs:   Mild congestion  Abdomen: Bowel sounds present. Soft, nontender, nondistended. Extremities: No cyanosis or clubbing. No edema  Skin:     No rashes and normal coloration. Warm and dry. Neuro:  grossly intact. Psych:  Normal mood and affect.       I have personally reviewed labs and tests showing:  Recent Labs:  Recent Results (from the past 48 hour(s))   POCT Glucose    Collection Time: 11/08/22  4:16 PM   Result Value Ref Range    POC Glucose 99 65 - 100 mg/dL    Performed by: Rashad    Transthoracic echocardiogram (TTE) complete with contrast, 14.6 mL/m2    LA Volume Index 2C 10 (A) 16 - 34 mL/m2    LA Volume Index 4C 13 (A) 16 - 34 mL/m2    LA Size Index 1.35 cm/m2    LA/AO Root Ratio 0.90     Ao Root Index 1.50 cm/m2    Ascending Aorta Index 1.59 cm/m2    AV Velocity Ratio 0.69     LVOT:AV VTI Index 0.56     LIZZY/BSA VTI 0.9 cm2/m2    LIZZY/BSA Peak Velocity 1.1 cm2/m2    MV:LVOT VTI Index 1.90    Anti-Xa, Unfractionated Heparin    Collection Time: 11/08/22  6:14 PM   Result Value Ref Range    Anti-XA Unfrac Heparin >1.1 (H) 0.3 - 0.7 IU/mL   POCT Glucose    Collection Time: 11/08/22  8:32 PM   Result Value Ref Range    POC Glucose 152 (H) 65 - 100 mg/dL    Performed by: Yassine    CBC with Auto Differential    Collection Time: 11/09/22  4:32 AM   Result Value Ref Range    WBC 12.5 (H) 4.3 - 11.1 K/uL    RBC 3.95 (L) 4.23 - 5.6 M/uL    Hemoglobin 12.2 (L) 13.6 - 17.2 g/dL    Hematocrit 38.1 (L) 41.1 - 50.3 %    MCV 96.5 82 - 102 FL    MCH 30.9 26.1 - 32.9 PG    MCHC 32.0 31.4 - 35.0 g/dL    RDW 13.9 11.9 - 14.6 %    Platelets 072 (L) 666 - 450 K/uL    MPV 10.6 9.4 - 12.3 FL    nRBC 0.00 0.0 - 0.2 K/uL    Differential Type AUTOMATED      Seg Neutrophils 77 43 - 78 %    Lymphocytes 15 13 - 44 %    Monocytes 6 4.0 - 12.0 %    Eosinophils % 1 0.5 - 7.8 %    Basophils 0 0.0 - 2.0 %    Immature Granulocytes 1 0.0 - 5.0 %    Segs Absolute 9.6 (H) 1.7 - 8.2 K/UL    Absolute Lymph # 1.9 0.5 - 4.6 K/UL    Absolute Mono # 0.7 0.1 - 1.3 K/UL    Absolute Eos # 0.1 0.0 - 0.8 K/UL    Basophils Absolute 0.0 0.0 - 0.2 K/UL    Absolute Immature Granulocyte 0.2 0.0 - 0.5 K/UL   Basic Metabolic Panel w/ Reflex to MG    Collection Time: 11/09/22  4:32 AM   Result Value Ref Range    Sodium 141 133 - 143 mmol/L    Potassium 3.9 3.5 - 5.1 mmol/L    Chloride 111 (H) 101 - 110 mmol/L    CO2 24 21 - 32 mmol/L    Anion Gap 6 2 - 11 mmol/L    Glucose 90 65 - 100 mg/dL    BUN 16 8 - 23 MG/DL    Creatinine 0.90 0.8 - 1.5 MG/DL    Est, Glom Filt Rate >60 >60 ml/min/1.73m2 Calcium 8.6 8.3 - 10.4 MG/DL   Anti-Xa, Unfractionated Heparin    Collection Time: 11/09/22  4:32 AM   Result Value Ref Range    Anti-XA Unfrac Heparin 0.40 0.3 - 0.7 IU/mL   POCT Glucose    Collection Time: 11/09/22  8:20 AM   Result Value Ref Range    POC Glucose 96 65 - 100 mg/dL    Performed by: River    POCT Glucose    Collection Time: 11/09/22 11:37 AM   Result Value Ref Range    POC Glucose 154 (H) 65 - 100 mg/dL    Performed by: DemarE.M.A.R.C.PCT    EKG 12 Lead    Collection Time: 11/09/22  1:53 PM   Result Value Ref Range    Ventricular Rate 108 BPM    Atrial Rate 108 BPM    P-R Interval 152 ms    QRS Duration 92 ms    Q-T Interval 340 ms    QTc Calculation (Bazett) 455 ms    P Axis 74 degrees    R Axis 70 degrees    T Axis 51 degrees    Diagnosis Sinus tachycardia    POCT Glucose    Collection Time: 11/09/22  4:40 PM   Result Value Ref Range    POC Glucose 119 (H) 65 - 100 mg/dL    Performed by:  Guerda    POCT Glucose    Collection Time: 11/09/22  8:46 PM   Result Value Ref Range    POC Glucose 122 (H) 65 - 100 mg/dL    Performed by: Hector    CBC with Auto Differential    Collection Time: 11/10/22  3:14 AM   Result Value Ref Range    WBC 12.1 (H) 4.3 - 11.1 K/uL    RBC 3.70 (L) 4.23 - 5.6 M/uL    Hemoglobin 11.7 (L) 13.6 - 17.2 g/dL    Hematocrit 35.5 (L) 41.1 - 50.3 %    MCV 95.9 82 - 102 FL    MCH 31.6 26.1 - 32.9 PG    MCHC 33.0 31.4 - 35.0 g/dL    RDW 13.8 11.9 - 14.6 %    Platelets 787 (L) 777 - 450 K/uL    MPV 10.8 9.4 - 12.3 FL    nRBC 0.00 0.0 - 0.2 K/uL    Differential Type AUTOMATED      Seg Neutrophils 79 (H) 43 - 78 %    Lymphocytes 13 13 - 44 %    Monocytes 7 4.0 - 12.0 %    Eosinophils % 0 (L) 0.5 - 7.8 %    Basophils 0 0.0 - 2.0 %    Immature Granulocytes 1 0.0 - 5.0 %    Segs Absolute 9.4 (H) 1.7 - 8.2 K/UL    Absolute Lymph # 1.6 0.5 - 4.6 K/UL    Absolute Mono # 0.8 0.1 - 1.3 K/UL    Absolute Eos # 0.1 0.0 - 0.8 K/UL    Basophils Absolute 0.0 0.0 - 0.2 K/UL    Absolute Immature Granulocyte 0.1 0.0 - 0.5 K/UL   POCT Glucose    Collection Time: 11/10/22  8:59 AM   Result Value Ref Range    POC Glucose 136 (H) 65 - 100 mg/dL    Performed by: ToneyRitaPCT    POCT Glucose    Collection Time: 11/10/22 11:35 AM   Result Value Ref Range    POC Glucose 111 (H) 65 - 100 mg/dL    Performed by: BradfordyRitaPCT        I have personally reviewed imaging studies showing: Other Studies:  Vascular duplex lower extremity venous bilateral   Final Result   Occlusive DVT in the right femoral, popliteal, posterior tibial and   peroneal veins. IR GUIDED THROMB [facility] VEIN   Final Result   Venography demonstrates no thrombus in the right common iliac vein   or lower inferior vena cava. Pulmonary arteriography after thrombectomy   demonstrates a small, nonocclusive, thrombus in one of the left lower lobe   pulmonary artery segmental branches. Final right pulmonary angiography   demonstrates no residual thrombus. Plan:  2 hours bedrest.  Transition from heparin infusion to oral   anticoagulation later tonight or tomorrow. Lower extremity venous ultrasound   was performed after this procedure. Duplex ultrasound demonstrates occlusive   thrombus in the right femoral, popliteal, posterior tibial, and peroneal veins. However, the patient has no right lower extremity swelling. Recommend minimum 6   months oral anticoagulation.    _______________________________________________________________      PROCEDURE SUMMARY:   - Venous access with ultrasound guidance   - Pelvic venography.   - Bilateral main pulmonary angiography   - Superselective pulmonary angiography: Right superior trunk pulmonary   angiography   - Pulmonary arterial interventions as described below   - Additional procedure(s): Inari FlowSaver with immediate blood return      PROCEDURE DETAILS:      Pre-procedure   Consent:  Informed written and oral consent for the procedure was obtained after   explanation of risks (including, but not limitted to:  hemorrhage, vascular   injury, infection) benefits, and alternatives. The patient's questions were   answered to his/her satisfaction. He/She stated understanding and requested   that we proceed. Final verification:  A time-out identifying the patient and planned procedure   was performed prior to this procedure. Preparation (MIPS):  Maximal sterile barrier technique (including:  Sterile   Ultrasound probe cover, Sterile Ultrasound gel, cap, mask, Sterile Gown, Sterile   Gloves, Sterile Drape, hand hygiene, and 2% chlorhexidine cutaneous antisepsis)   was used. Anesthesia/sedation   Level of anesthesia/sedation:  Moderate sedation (conscious sedation)   Anesthesia/sedation administered by: Independent trained observer under   attending supervision with continuous monitoring of the patient?s level of   consciousness and physiologic status   Total intra-service sedation time (minutes):  110      Access   Local anesthesia was administered. The right great saphenous vein insertion and   common femoral vein was sonographically evaluated and determined to be patent. Real time ultrasound was used to visualize needle entry into the vessel and a   permanent image was stored. Initially, an 8-Tunisian sheath was placed. Eventually, 24 Fr Inari Sheath sheath was placed. Vein accessed:  Right Great Saphenous Vein   Access technique:  Micropuncture set with 21 gauge needle      Pelvic venography    The pelvic venous system was catheterized with an 8-Tunisian sheath. Indication for angiography:  Diagnostic angiography - There was no prior   catheter-based angiographic study available and a full diagnostic study was   performed. The decision to intervene was based on the diagnostic study. Concern   for right pelvic thrombus given the recent pulmonary embolism.       Vessel catheterized:  Right external iliac vein   Findings:  Widely patent right external iliac vein and common iliac vein.  No   thrombus in the peripheral most portion of the inferior vena cava. Some   contrast does reflux into the left common iliac vein, demonstrating patency. Multiple catheters and wires were utilized in an attempt to traverse the right   heart and access the pulmonary outflow tract. 2  right ventriculograms were   obtained in order to confirm appropriate location and to exclude a right   ventricular thrombus. Right ventriculogram   Vessel catheterized:  Right ventricle was accessed with a pigtail catheter   Findings:  No right ventricle thrombus identified. Sluggish flow through the   pulmonary outflow tract and into the main right and left pulmonary arteries. Eventually, a Bentson wire was successfully passed through the right heart using   a 6-Swedish  catheter. The  catheter was removed. An   occlusion balloon catheter was advanced to the atriocaval junction. The balloon   was inflated with 8 cc of air and then gently advanced over the Bentson wire   into the left main pulmonary artery. The balloon catheter met no resistance,   confirming that the wire passed was safe and appropriate without in tangle and a   chordee tendinae. The Bentson wire was replaced with an exchange length Amplatz   wire with the tip positioned in a left lower lobe pulmonary artery. Due to the patient's worsening hypoxia, pulmonary angiography was not performed   at this time. Instead, the Triever 24 was quickly advanced and thrombectomy was   performed. Mechanical Thrombectomy/Aspiration Thrombectomy location : Left lower lobe and   left main pulmonary artery   Thrombectomy device:  Inari FlowTriever 24   Thrombectomy type:  Manual Aspiration      A small volume of acute thrombus was returned.       Post-intervention angiography:     Vessel catheterized:  Left main pulmonary artery    Findings:  Excellent anterograde flow into the upper lobe, lingula, and lower   lobe with the exception of one segmental branch in the lower lobe. Due to its   distal location, and the patient's worsening hypoxia, heart rate, and blood   pressure I decided to forego any further left lung treatment and to focus on the   right lung. Using the exchange length Amplatz wire and the super pigtail catheter, the right   lower lobe pulmonary artery was accessed. The pigtail catheter was removed, the   dilator was replaced and the Triever 24 for catheter was quickly advanced into   the right lower lobe pulmonary artery. Mechanical thrombectomy was then   immediately performed. Mechanical thrombectomy location : Right lower lobe and main pulmonary artery   Mechanical thrombectomy device:  Inari FlowTriever 24   Mechanical thrombectomy type:  Manual Aspiration      Large volume of acute and chronic thrombus was returned. Once no additional   thrombus could be aspirated, a right lower lobe pulmonary arteriogram was   performed. Vessel catheterized:  Right lower lobe pulmonary artery   Findings:  Excellent flow in the right lower lobe and right middle lobe   pulmonary artery branches. Although incompletely visualized, there appeared to   be occlusive thrombus in the right upper lobe pulmonary artery. The Glory Simple was carefully retracted until the tip intubated the superior trunk   pulmonary artery. The Amplatz wire was gently advanced and the aspiration   catheter was seated into the superior trunk. Vessel catheterized:  Superior trunk, right pulmonary artery   Findings:  Occlusive thrombus in the right upper lobe pulmonary artery. Once again, aspiration thrombectomy was performed. Mechanical Thrombectomy/Aspiration Thrombectomy location : Right upper lobe   pulmonary artery   Thrombectomy device:  Inari FlowTriever 24   Thrombectomy type:  Manual Aspiration      A sizable thrombus was returned. After this, a final right upper lobe pulmonary   arteriogram was performed. Vessel catheterized:  Right upper lobe pulmonary artery   Findings:  No residual thrombus identified in the right upper lobe pulmonary   artery distribution. Reflux into the main pulmonary artery again confirms its   patency with excellent flow into the right middle lobe and lower lobe pulmonary   artery branches. At this point, the [id number] and wire were removed. Blood Conservation:  Evlyn Messier was utilized after every aspiration event. Closure   The sheath was removed. Hemostasis was achieved with a pursestring suture and   Inari FlowStasis device. A sterile dressing was applied. Contrast   Contrast agent:  Isovue-300   Contrast volume (mL):  150      Radiation Dose   Reference Air Kerma (Suzzane Seed):  2138 mGy   Dose Area Product/Kerma Area Product (DAP/ORLANDO/PKA): 47,660.26cGy-cm2   Fluoroscopy Exposure Time:  25 minutes 18 seconds        Fluorographic Images:  4 pulmonary arteriograms, 1 right ventriculogram, 1   pelvic venogram       Additional Details   Equipment details:  None   Specimens removed:  None   Estimated blood loss (mL):  11-50   Standardized report: [email or web address]      Attestation   Signer name: Pita Nava M.D. I attest that I performed the entire procedure. I reviewed the stored images and   agree with the report as written. CT CHEST PULMONARY EMBOLISM W CONTRAST   Final Result   1. Acute large bilateral pulmonary emboli. 2. Small right peripheral lung pulmonary infarctions. 3. Coronary artery and aortic calcifications. 4. Probable COPD. DC5   The critical results contained in this report were communicated to Dr. Leesa Domingo   by Dr Selam Edwards on the phone at 5:30 PM. Critical results were communicated as   outlined in Section II.C.2.a.i of the ACR Practice Guideline for Communication   of Diagnostic Imaging Findings. XR CHEST PORTABLE   Final Result   No consolidation.           Current Meds:  Current Facility-Administered Medications   Medication Dose Route Frequency    doxycycline hyclate (VIBRAMYCIN) capsule 100 mg  100 mg Oral 2 times per day    oxyCODONE (ROXICODONE) immediate release tablet 10 mg  10 mg Oral Q4H PRN    Or    oxyCODONE (ROXICODONE) immediate release tablet 10 mg  10 mg Oral Q4H PRN    apixaban (ELIQUIS) tablet 10 mg  10 mg Oral BID    sodium chloride flush 0.9 % injection 5-40 mL  5-40 mL IntraVENous 2 times per day    sodium chloride flush 0.9 % injection 5-40 mL  5-40 mL IntraVENous PRN    0.9 % sodium chloride infusion   IntraVENous PRN    ondansetron (ZOFRAN-ODT) disintegrating tablet 4 mg  4 mg Oral Q8H PRN    Or    ondansetron (ZOFRAN) injection 4 mg  4 mg IntraVENous Q6H PRN    polyethylene glycol (GLYCOLAX) packet 17 g  17 g Oral Daily PRN    acetaminophen (TYLENOL) tablet 650 mg  650 mg Oral Q6H PRN    Or    acetaminophen (TYLENOL) suppository 650 mg  650 mg Rectal Q6H PRN    glucose chewable tablet 16 g  4 tablet Oral PRN    dextrose bolus 10% 125 mL  125 mL IntraVENous PRN    Or    dextrose bolus 10% 250 mL  250 mL IntraVENous PRN    glucagon (rDNA) injection 1 mg  1 mg SubCUTAneous PRN    dextrose 10 % infusion   IntraVENous Continuous PRN    insulin lispro (HUMALOG) injection vial 0-8 Units  0-8 Units SubCUTAneous TID WC    insulin lispro (HUMALOG) injection vial 0-4 Units  0-4 Units SubCUTAneous Nightly    atorvastatin (LIPITOR) tablet 80 mg  80 mg Oral Nightly    diazePAM (VALIUM) tablet 5 mg  5 mg Oral Daily    aspirin EC tablet 81 mg  81 mg Oral Daily    ezetimibe (ZETIA) tablet 10 mg  10 mg Oral Nightly    nicotine (NICODERM CQ) 21 MG/24HR 1 patch  1 patch TransDERmal Daily    famotidine (PEPCID) tablet 20 mg  20 mg Oral BID    tiotropium (SPIRIVA RESPIMAT) 2.5 MCG/ACT inhaler 2 puff  2 puff Inhalation Daily    albuterol (PROVENTIL) nebulizer solution 2.5 mg  2.5 mg Nebulization Q8H PRN       Signed:  Paco Schulz MD    Part of this note may have been written by using a voice dictation software. The note has been proof read but may still contain some grammatical/other typographical errors.

## 2022-11-10 NOTE — H&P
[facility] Cardiology Initial Cardiac Evaluation                 Date of  Admission: 11/7/2022  3:59 PM     Primary Care Physician: DASH  Primary Cardiologist: Dr. Rosa Joshi  Referring Physician: Hospitalist   Supervising Cardiologist: Dr. Sahra Francisco    Reason for cardiac evaluation: exertional tachy, recent PE, dizziness with ambulation, depressed EF      Nargis Lyn is a 75 y.o. male with prior h/o CAD, PVD,  mild carotid stenosis per duplex in 2021, abnormal calcium score in 2016 of 904, HTN, HLP, obesity, hypogonadism on testosterone and tob abuse. He was not taking low dose xarelto at home due to cost for his PAD/CAD. Patient presented to ED at [facility] with c/o sudden onset SOB and elevated HR with heart palpitations. He also reported chills, sweats but no known fevers, and non productive cough. In ED, HR was 130, B/P stable. Labs showed WBC 12.2, Cr 1.3, procal normal, O2 sat 86% on RA. CTA chest + PE. He was started on hep in ED. Hospitalist admitted patient for large PE, acute resp failure. It was felt likely his PE from testosterone therapy. IR was consulted and patient underwent bilateral Pulmonary Thrombectomy on 11/8. Also right DVT. Yesterday d/c held d/t tachycardia and dizziness. EKG showed ST and Echo showed EF 45-50%, mild global hypokinesis, DD, RV mildly reduced. Cardiology was consulted for exertional tachy, recent PE, dizziness with ambulation, depressed EF. He has transitioned from hep gtt to eliquis now. He denies any chest pain or heart palpitations PTA and today. Does have SOB but much improved since admission.        Patient Active Problem List   Diagnosis    PAD (peripheral artery disease) (HCC)    Atherosclerosis of both carotid arteries    Obesity (BMI 35.0-39.9 without comorbidity)    Noncompliance    Essential hypertension    Erythrocytosis    Chronic low back pain with sciatica    Hypogonadism in male    Coronary artery disease involving native coronary artery of native heart without angina pectoris    Arthralgia of both knees    Fatigue    Morbid obesity (HCC)    Muscle cramps at night    History of kidney stones    Mixed hyperlipidemia    Acute pulmonary embolism, unspecified pulmonary embolism type, unspecified whether acute cor pulmonale present (HCC)    Sinus tachycardia    Dizziness       Past Medical History:   Diagnosis Date    Arthritis     Asthma     Calculus of kidney     Hypercholesterolemia     Hypertension       Past Surgical History:   Procedure Laterality Date    HEENT Bilateral     cataracts    [facility]  11/8/2022    [facility] 11/8/2022 [facility]    ORTHOPEDIC SURGERY      rotator cuff ten years ago     Allergies   Allergen Reactions    Methocarbamol Hives    Ciprofloxacin Nausea And Vomiting      Family History   Problem Relation Age of Onset    Asthma Mother     Thyroid Disease Mother     COPD Mother     Heart Disease Father     Hypertension Father     Cancer Neg Hx     Deep Vein Thrombosis Neg Hx       Social History     Socioeconomic History    Marital status:      Spouse name: Not on file    Number of children: Not on file    Years of education: Not on file    Highest education level: Not on file   Occupational History    Not on file   Tobacco Use    Smoking status: Every Day     Packs/day: 1.00     Types: Cigarettes    Smokeless tobacco: Never   Substance and Sexual Activity    Alcohol use:  Yes     Alcohol/week: 0.0 standard drinks    Drug use: No    Sexual activity: Not on file   Other Topics Concern    Not on file   Social History Narrative    Not on file     Social Determinants of Health     Financial Resource Strain: Not on file   Food Insecurity: Not on file   Transportation Needs: Not on file   Physical Activity: Not on file   Stress: Not on file   Social Connections: Not on file   Intimate Partner Violence: Not on file   Housing Stability: Not on file       Current Facility-Administered Medications   Medication Dose Route Frequency    doxycycline hyclate (VIBRAMYCIN) capsule 100 mg  100 mg Oral 2 times per day    oxyCODONE (ROXICODONE) immediate release tablet 10 mg  10 mg Oral Q4H PRN    Or    oxyCODONE (ROXICODONE) immediate release tablet 10 mg  10 mg Oral Q4H PRN    apixaban (ELIQUIS) tablet 10 mg  10 mg Oral BID    sodium chloride flush 0.9 % injection 5-40 mL  5-40 mL IntraVENous 2 times per day    sodium chloride flush 0.9 % injection 5-40 mL  5-40 mL IntraVENous PRN    0.9 % sodium chloride infusion   IntraVENous PRN    ondansetron (ZOFRAN-ODT) disintegrating tablet 4 mg  4 mg Oral Q8H PRN    Or    ondansetron (ZOFRAN) injection 4 mg  4 mg IntraVENous Q6H PRN    polyethylene glycol (GLYCOLAX) packet 17 g  17 g Oral Daily PRN    acetaminophen (TYLENOL) tablet 650 mg  650 mg Oral Q6H PRN    Or    acetaminophen (TYLENOL) suppository 650 mg  650 mg Rectal Q6H PRN    glucose chewable tablet 16 g  4 tablet Oral PRN    dextrose bolus 10% 125 mL  125 mL IntraVENous PRN    Or    dextrose bolus 10% 250 mL  250 mL IntraVENous PRN    glucagon (rDNA) injection 1 mg  1 mg SubCUTAneous PRN    dextrose 10 % infusion   IntraVENous Continuous PRN    insulin lispro (HUMALOG) injection vial 0-8 Units  0-8 Units SubCUTAneous TID WC    insulin lispro (HUMALOG) injection vial 0-4 Units  0-4 Units SubCUTAneous Nightly    atorvastatin (LIPITOR) tablet 80 mg  80 mg Oral Nightly    diazePAM (VALIUM) tablet 5 mg  5 mg Oral Daily    aspirin EC tablet 81 mg  81 mg Oral Daily    ezetimibe (ZETIA) tablet 10 mg  10 mg Oral Nightly    nicotine (NICODERM CQ) 21 MG/24HR 1 patch  1 patch TransDERmal Daily    famotidine (PEPCID) tablet 20 mg  20 mg Oral BID    tiotropium (SPIRIVA RESPIMAT) 2.5 MCG/ACT inhaler 2 puff  2 puff Inhalation Daily    albuterol (PROVENTIL) nebulizer solution 2.5 mg  2.5 mg Nebulization Q8H PRN       Review of Systems   Constitutional: Positive for chills and diaphoresis.  Negative for decreased appetite, fever, malaise/fatigue, weight gain and weight loss. HENT:  Negative for congestion. Eyes:  Negative for blurred vision and double vision. Cardiovascular:  Positive for dyspnea on exertion and palpitations. Negative for chest pain, irregular heartbeat, leg swelling, near-syncope and syncope. Respiratory:  Positive for cough (non productive) and shortness of breath. Negative for wheezing. Endocrine: Negative for cold intolerance and heat intolerance. Hematologic/Lymphatic: Does not bruise/bleed easily. Skin:  Negative for color change and nail changes. Musculoskeletal:  Negative for back pain, falls and muscle weakness. Gastrointestinal:  Negative for bloating, abdominal pain, diarrhea, melena and nausea. Genitourinary:  Negative for dysuria and frequency. Neurological:  Negative for dizziness, focal weakness, headaches, light-headedness and weakness. Psychiatric/Behavioral:  Negative for altered mental status.        Physical Exam  Vitals:    11/10/22 0415 11/10/22 0737 11/10/22 0940 11/10/22 1031   BP:  124/87  118/79   Pulse: 89 (!) 106 75 (!) 107   Resp:  20 18 18   Temp:  99.9 °F (37.7 °C)  99.1 °F (37.3 °C)   TempSrc:  Oral  Oral   SpO2:  91% 93% 96%   Weight:       Height:           Physical Exam:  General: Well Developed, Well Nourished, No Acute Distress  HEENT: pupils equal and round, no abnormalities noted  Neck: supple, no JVD, no carotid bruits  Heart: S1S2 with RRR without murmurs or gallops  Lungs: Clear throughout auscultation bilaterally without adventitious sounds  Abd: soft, nontender, nondistended, with good bowel sounds  Ext: warm, no edema, calves supple/nontender, pulses 2+ bilaterally  Skin: warm and dry  Psychiatric: Normal mood and affect  Neurologic: Alert and oriented X 3    Cardiographics    Telemetry: ST  ECG: ST  Echocardiogram: 11/07/22    TRANSTHORACIC ECHOCARDIOGRAM (TTE) COMPLETE (CONTRAST/BUBBLE/3D PRN) 11/08/2022  4:45 PM (Final)    Interpretation Summary    Left Ventricle: Mildly reduced left ventricular systolic function with a visually estimated EF of 45 - 50%. Left ventricle size is normal. Normal wall thickness. Mild global hypokinesis present. Abnormal diastolic function. Right Ventricle: Right ventricle is mildly dilated. Mildly reduced systolic function. Aortic Valve: Tricuspid valve. Mild sclerosis of the aortic valve cusp. Mitral Valve: Mild regurgitation. Technical qualifiers: Color flow Doppler was performed and pulse wave and/or continuous wave Doppler was performed. Contrast used: Definity. Signed by: Cayetano Donaldson MD on 11/8/2022  4:45 PM     Labs:   Recent Labs     11/07/22  1833 11/08/22  0321 11/09/22  0432 11/10/22  0314   NA  --  139 141  --    K  --  3.8 3.9  --    MG  --  2.0  --   --    BUN  --  24* 16  --    WBC  --  11.5* 12.5* 12.1*   HGB  --  13.9 12.2* 11.7*   HCT  --  41.3 38.1* 35.5*   PLT  --  130* 112* 139*   INR 1.2  --   --   --         Assessment/Plan:     Assessment:      Principal Problem:    Acute pulmonary embolism, unspecified pulmonary embolism type, unspecified whether acute cor pulmonale present ([facility] [address]- per primary team; s/p pulmonary Thrombectomy on 11/8. Active Problems:    Sinus tachycardia- likely a compensatory response from large PE. Reviewed echo and EF 45-50%. Will add low dose Toprol. Dizziness- likely from acute illness; will check orthostatic B/Ps      PAD (peripheral artery disease) ([address]- on home meds ASA and statin      Atherosclerosis of both carotid arteries- home meds asa/statin      Obesity (BMI 35.0-39.9 without comorbidity)-      Essential hypertension- controlled; will follow       Coronary artery disease involving native coronary artery of native heart without angina pectoris- no active angaina sx; abnormal calcium score in 2016 of 904 but was asx. Continue home meds ASA, statin. Mixed hyperlipidemia- home statin        We appreciate the opportunity to participate in this patient's care. Geetha Ford, APRN - CNP  Supervising MD: DR. Keisha Mcdonough CARDIOLOGY  [address]  [address]  PHONE: [phone]        11/10/22      NAME:  Mandie Gupta  : 1955  MRN: [id number]      SUBJECTIVE:   Mandie Gupta is a 75 y.o. male seen for a consultation visit regarding the following:     Chief Complaint   Patient presents with    Shortness of Breath            HPI:    78-year-old gentleman who was admitted with acute onset shortness of breath and hypoxia. He was found to have a large PE and underwent bilateral thrombectomy. Ejection fraction is 45% which the patient states is known to him. He does not feel palpitations but resting heart rate is 108 and increases to 120s to 130s with exertion. He has no chest pain shortness of breath orthopnea PND or syncope.     Hospital Problems             Last Modified POA    * (Principal) Acute pulmonary embolism, unspecified pulmonary embolism type, unspecified whether acute cor pulmonale present ([address] 2022 Yes    Sinus tachycardia 11/10/2022 Yes    Dizziness 11/10/2022 Yes    PAD (peripheral artery disease) ([address] 2022 Yes    Atherosclerosis of both carotid arteries 2022 Yes    Obesity (BMI 35.0-39.9 without comorbidity) 2022 Yes    Essential hypertension 2022 Yes    Coronary artery disease involving native coronary artery of native heart without angina pectoris 2022 Yes    Mixed hyperlipidemia 2022 Yes     Allergies   Allergen Reactions    Methocarbamol Hives    Ciprofloxacin Nausea And Vomiting     Past Medical History:   Diagnosis Date    Arthritis     Asthma     Calculus of kidney     Hypercholesterolemia     Hypertension      Past Surgical History:   Procedure Laterality Date    HEENT Bilateral     cataracts    [facility]  2022    [facility] 2022 [facility]    ORTHOPEDIC SURGERY      rotator cuff ten years ago Family History   Problem Relation Age of Onset    Asthma Mother     Thyroid Disease Mother     COPD Mother     Heart Disease Father     Hypertension Father     Cancer Neg Hx     Deep Vein Thrombosis Neg Hx      Social History     Tobacco Use    Smoking status: Every Day     Packs/day: 1.00     Types: Cigarettes    Smokeless tobacco: Never   Substance Use Topics    Alcohol use: Yes     Alcohol/week: 0.0 standard drinks           ROS:    Constitution: Negative for fever. Eyes: Negative for blurred vision. Respiratory: Negative for cough. Endocrine: Negative for cold intolerance and heat intolerance. Skin: Negative for rash. Musculoskeletal: Negative for myalgias. Gastrointestinal: Negative for diarrhea, nausea and vomiting. Genitourinary: Negative for dysuria. Neurological: Negative for headaches and numbness. PHYSICAL EXAM:     /79   Pulse (!) 107   Temp 99.1 °F (37.3 °C) (Oral)   Resp 18   Ht 5' 7\" (1.702 m)   Wt 211 lb (95.7 kg)   SpO2 96%   BMI 33.05 kg/m²    Constitutional: Oriented to person, place, and time. Appears well-developed and well-nourished. Head: Normocephalic and atraumatic. Neck: Neck supple. Cardiovascular: Normal rate and regular rhythm with no murmur -No JVP  Pulmonary/Chest: Breath sounds normal.   Abdominal: Soft. Musculoskeletal: No edema. Neurological: Alert and oriented to person, place, and time. Skin: Skin is warm and dry. Psychiatric: Normal mood and affect. Vitals reviewed        Medical problems and test results were reviewed with the patient today.      Wt Readings from Last 3 Encounters:   11/08/22 211 lb (95.7 kg)   05/09/22 222 lb (100.7 kg)   03/30/22 226 lb (102.5 kg)          Recent Results (from the past 672 hour(s))   CBC with Auto Differential    Collection Time: 10/31/22  8:54 AM   Result Value Ref Range    WBC 9.6 4.3 - 11.1 K/uL    RBC 4.65 4.23 - 5.6 M/uL    Hemoglobin 14.9 13.6 - 17.2 g/dL    Hematocrit 43.8 41.1 - 50.3 %    MCV 94.2 82 - 102 FL    MCH 32.0 26.1 - 32.9 PG    MCHC 34.0 31.4 - 35.0 g/dL    RDW 14.1 11.9 - 14.6 %    Platelets 336 089 - 593 K/uL    MPV 10.0 9.4 - 12.3 FL    nRBC 0.00 0.0 - 0.2 K/uL    Differential Type AUTOMATED      Seg Neutrophils 68 43 - 78 %    Lymphocytes 23 13 - 44 %    Monocytes 7 4.0 - 12.0 %    Eosinophils % 1 0.5 - 7.8 %    Basophils 0 0.0 - 2.0 %    Immature Granulocytes 1 0.0 - 5.0 %    Segs Absolute 6.5 1.7 - 8.2 K/UL    Absolute Lymph # 2.2 0.5 - 4.6 K/UL    Absolute Mono # 0.7 0.1 - 1.3 K/UL    Absolute Eos # 0.1 0.0 - 0.8 K/UL    Basophils Absolute 0.0 0.0 - 0.2 K/UL    Absolute Immature Granulocyte 0.1 0.0 - 0.5 K/UL   EKG 12 Lead    Collection Time: 11/07/22  1:58 PM   Result Value Ref Range    Ventricular Rate 127 BPM    Atrial Rate 127 BPM    P-R Interval 146 ms    QRS Duration 78 ms    Q-T Interval 294 ms    QTc Calculation (Bazett) 427 ms    P Axis 73 degrees    R Axis 65 degrees    T Axis 21 degrees    Diagnosis       Sinus tachycardia with occasional Premature ventricular complexes   Culture, Blood 1    Collection Time: 11/07/22  2:19 PM    Specimen: Blood   Result Value Ref Range    Special Requests LEFT  FOREARM        Culture NO GROWTH 3 DAYS     Lactate, Sepsis    Collection Time: 11/07/22  2:19 PM   Result Value Ref Range    Lactic Acid, Sepsis 2.1 (H) 0.4 - 2.0 MMOL/L   CBC with Auto Differential    Collection Time: 11/07/22  2:19 PM   Result Value Ref Range    WBC 12.2 (H) 4.3 - 11.1 K/uL    RBC 5.10 4.23 - 5.6 M/uL    Hemoglobin 16.2 13.6 - 17.2 g/dL    Hematocrit 48.0 41.1 - 50.3 %    MCV 94.1 82 - 102 FL    MCH 31.8 26.1 - 32.9 PG    MCHC 33.8 31.4 - 35.0 g/dL    RDW 13.8 11.9 - 14.6 %    Platelets 025 213 - 098 K/uL    MPV 9.7 9.4 - 12.3 FL    nRBC 0.00 0.0 - 0.2 K/uL    Differential Type AUTOMATED      Seg Neutrophils 78 43 - 78 %    Lymphocytes 15 13 - 44 %    Monocytes 6 4.0 - 12.0 %    Eosinophils % 0 (L) 0.5 - 7.8 %    Basophils 0 0.0 - 2.0 %    Immature Granulocytes 1 0.0 - 5.0 %    Segs Absolute 9.5 (H) 1.7 - 8.2 K/UL    Absolute Lymph # 1.9 0.5 - 4.6 K/UL    Absolute Mono # 0.7 0.1 - 1.3 K/UL    Absolute Eos # 0.0 0.0 - 0.8 K/UL    Basophils Absolute 0.0 0.0 - 0.2 K/UL    Absolute Immature Granulocyte 0.1 0.0 - 0.5 K/UL   CMP    Collection Time: 11/07/22  2:19 PM   Result Value Ref Range    Sodium 136 133 - 143 mmol/L    Potassium 4.9 3.5 - 5.1 mmol/L    Chloride 108 101 - 110 mmol/L    CO2 23 21 - 32 mmol/L    Anion Gap 5 2 - 11 mmol/L    Glucose 131 (H) 65 - 100 mg/dL    BUN 29 (H) 8 - 23 MG/DL    Creatinine 1.30 0.8 - 1.5 MG/DL    Est, Glom Filt Rate 60 (L) >60 ml/min/1.73m2    Calcium 10.3 8.3 - 10.4 MG/DL    Total Bilirubin 0.8 0.2 - 1.1 MG/DL    ALT 35 12 - 65 U/L    AST 48 (H) 15 - 37 U/L    Alk Phosphatase 73 50 - 136 U/L    Total Protein 7.6 6.3 - 8.2 g/dL    Albumin 3.3 3.2 - 4.6 g/dL    Globulin 4.3 2.8 - 4.5 g/dL    Albumin/Globulin Ratio 0.8 0.4 - 1.6     Procalcitonin    Collection Time: 11/07/22  2:19 PM   Result Value Ref Range    Procalcitonin <0.05 0.00 - 0.49 ng/mL   Respiratory Panel, Molecular, with COVID-19 (Restricted: peds pts or suitable admitted adults)    Collection Time: 11/07/22  2:19 PM    Specimen: Nasopharyngeal   Result Value Ref Range    Adenovirus by PCR NOT DETECTED NOTDET      Coronavirus 229E by PCR NOT DETECTED NOTDET      Coronavirus HKU1 by PCR NOT DETECTED NOTDET      Coronavirus NL63 by PCR NOT DETECTED NOTDET      Coronavirus OC43 by PCR NOT DETECTED NOTDET      SARS-CoV-2, PCR NOT DETECTED NOTDET      Human Metapneumovirus by PCR NOT DETECTED NOTDET      Rhinovirus Enterovirus PCR NOT DETECTED NOTDET      Influenza A by PCR NOT DETECTED NOTDET      Influenza B PCR NOT DETECTED NOTDET      Parainfluenza 1 PCR NOT DETECTED NOTDET      Parainfluenza 2 PCR NOT DETECTED NOTDET      Parainfluenza 3 PCR NOT DETECTED NOTDET      Parainfluenza 4 PCR NOT DETECTED NOTDET      Respiratory Syncytial Virus by PCR NOT DETECTED NOTDET Bordetella parapertussis by PCR NOT DETECTED NOTDET      Bordetella pertussis by PCR NOT DETECTED NOTDET      Chlamydophila Pneumonia PCR NOT DETECTED NOTDET      Mycoplasma pneumo by PCR NOT DETECTED NOTDET     POCT Urinalysis no Micro    Collection Time: 11/07/22  4:29 PM   Result Value Ref Range    Specific Gravity, Urine, POC >1.030 (H) 1.001 - 1.023    pH, Urine, POC 5.0 5.0 - 9.0      Protein, Urine,  (A) NEG mg/dL    Glucose, UA POC Negative NEG mg/dL    Ketones, Urine, POC TRACE (A) NEG mg/dL    Bilirubin, Urine, POC MODERATE (A) NEG      Blood, UA POC Negative NEG      URINE UROBILINOGEN POC 0.2 0.2 - 1.0 EU/dL    Nitrate, Urine, POC Negative NEG      Leukocyte Est, UA POC Negative NEG      Performed by: Mary Kay Peng    Lactate, Sepsis    Collection Time: 11/07/22  6:33 PM   Result Value Ref Range    Lactic Acid, Sepsis 2.2 (H) 0.4 - 2.0 MMOL/L   APTT    Collection Time: 11/07/22  6:33 PM   Result Value Ref Range    .5 (HH) 24.5 - 34.2 SEC   Protime-INR    Collection Time: 11/07/22  6:33 PM   Result Value Ref Range    Protime 15.6 (H) 12.6 - 14.3 sec    INR 1.2     Brain Natriuretic Peptide    Collection Time: 11/07/22  9:36 PM   Result Value Ref Range    NT Pro-BNP 4,319 (H) 5 - 125 PG/ML   Troponin    Collection Time: 11/07/22  9:36 PM   Result Value Ref Range    Troponin, High Sensitivity 1,087.4 (HH) 0 - 14 pg/mL   POCT Glucose    Collection Time: 11/07/22 10:25 PM   Result Value Ref Range    POC Glucose 106 (H) 65 - 100 mg/dL    Performed by: Yassine    Culture, Blood 1    Collection Time: 11/07/22 11:46 PM    Specimen: Blood   Result Value Ref Range    Special Requests LEFT  FOREARM        Culture NO GROWTH 2 DAYS     Anti-Xa, Unfractionated Heparin    Collection Time: 11/07/22 11:46 PM   Result Value Ref Range    Anti-XA Unfrac Heparin >1.1 (H) 0.3 - 0.7 IU/mL   CBC with Auto Differential    Collection Time: 11/08/22  3:21 AM   Result Value Ref Range    WBC 11.5 (H) 4.3 - 11.1 K/uL    RBC 4.36 4.23 - 5.6 M/uL    Hemoglobin 13.9 13.6 - 17.2 g/dL    Hematocrit 41.3 41.1 - 50.3 %    MCV 94.7 82 - 102 FL    MCH 31.9 26.1 - 32.9 PG    MCHC 33.7 31.4 - 35.0 g/dL    RDW 13.9 11.9 - 14.6 %    Platelets 952 (L) 335 - 450 K/uL    MPV 10.8 9.4 - 12.3 FL    nRBC 0.00 0.0 - 0.2 K/uL    Differential Type AUTOMATED      Seg Neutrophils 77 43 - 78 %    Lymphocytes 16 13 - 44 %    Monocytes 6 4.0 - 12.0 %    Eosinophils % 0 (L) 0.5 - 7.8 %    Basophils 0 0.0 - 2.0 %    Immature Granulocytes 1 0.0 - 5.0 %    Segs Absolute 8.8 (H) 1.7 - 8.2 K/UL    Absolute Lymph # 1.8 0.5 - 4.6 K/UL    Absolute Mono # 0.7 0.1 - 1.3 K/UL    Absolute Eos # 0.0 0.0 - 0.8 K/UL    Basophils Absolute 0.0 0.0 - 0.2 K/UL    Absolute Immature Granulocyte 0.1 0.0 - 0.5 K/UL   Magnesium    Collection Time: 11/08/22  3:21 AM   Result Value Ref Range    Magnesium 2.0 1.8 - 2.4 mg/dL   Comprehensive Metabolic Panel    Collection Time: 11/08/22  3:21 AM   Result Value Ref Range    Sodium 139 133 - 143 mmol/L    Potassium 3.8 3.5 - 5.1 mmol/L    Chloride 110 101 - 110 mmol/L    CO2 24 21 - 32 mmol/L    Anion Gap 5 2 - 11 mmol/L    Glucose 105 (H) 65 - 100 mg/dL    BUN 24 (H) 8 - 23 MG/DL    Creatinine 1.00 0.8 - 1.5 MG/DL    Est, Glom Filt Rate >60 >60 ml/min/1.73m2    Calcium 8.9 8.3 - 10.4 MG/DL    Total Bilirubin 0.6 0.2 - 1.1 MG/DL    ALT 27 12 - 65 U/L    AST 24 15 - 37 U/L    Alk Phosphatase 62 50 - 136 U/L    Total Protein 6.1 (L) 6.3 - 8.2 g/dL    Albumin 2.7 (L) 3.2 - 4.6 g/dL    Globulin 3.4 2.8 - 4.5 g/dL    Albumin/Globulin Ratio 0.8 0.4 - 1.6     Hemoglobin A1c    Collection Time: 11/08/22  3:21 AM   Result Value Ref Range    Hemoglobin A1C 6.1 (H) 4.8 - 5.6 %    eAG 128 mg/dL   Anti-Xa, Unfractionated Heparin    Collection Time: 11/08/22  8:20 AM   Result Value Ref Range    Anti-XA Unfrac Heparin 0.96 (H) 0.3 - 0.7 IU/mL   POCT Glucose    Collection Time: 11/08/22 12:04 PM   Result Value Ref Range    POC Glucose 104 (H) 65 - 100 mg/dL    Performed by: Mani    POCT Glucose    Collection Time: 11/08/22  4:16 PM   Result Value Ref Range    POC Glucose 99 65 - 100 mg/dL    Performed by: Rashad    Transthoracic echocardiogram (TTE) complete with contrast, bubble, strain, and 3D PRN    Collection Time: 11/08/22  4:26 PM   Result Value Ref Range    LV EDV A2C 86 mL    LV EDV A4C 113 mL    LV ESV A2C 50 mL    LV ESV A4C 64 mL    IVSd 1.0 0.6 - 1.0 cm    LVIDd 4.2 4.2 - 5.9 cm    LVIDs 3.3 cm    LVOT Diameter 2.0 cm    LVOT Mean Gradient 2 mmHg    LVOT VTI 9.6 cm    LVOT Peak Velocity 0.9 m/s    LVOT Peak Gradient 3 mmHg    LVPWd 1.0 0.6 - 1.0 cm    LV E' Lateral Velocity 7 cm/s    LV E' Septal Velocity 9 cm/s    LV Ejection Fraction A2C 42 %    LV Ejection Fraction A4C 43 %    EF BP 42 (A) 55 - 100 %    LVOT Area 3.1 cm2    LVOT SV 30.1 ml    LA Minor Axis 4.6 cm    LA Major [place] 4.9 cm    LA Area 2C 10.7 cm2    LA Area 4C 12.5 cm2    LA Volume 2C 20 18 - 58 mL    LA Volume 4C 26 18 - 58 mL    LA Volume BP 24 18 - 58 mL    LA Diameter 2.8 cm    AV Mean Velocity 0.9 m/s    AV Mean Gradient 4 mmHg    AV VTI 17.2 cm    AV Peak Velocity 1.3 m/s    AV Peak Gradient 7 mmHg    AV Area by VTI 1.8 cm2    AV Area by Peak Velocity 2.2 cm2    Aortic Root 3.1 cm    Ascending Aorta 3.3 cm    MV E Wave Deceleration Time 248.0 ms    MV A Velocity 1.20 m/s    MV E Velocity 0.58 m/s    MV Mean Gradient 2 mmHg    MV VTI 18.2 cm    MV Mean Velocity 0.7 m/s    MV Max Velocity 1.4 m/s    MV Peak Gradient 8 mmHg    MV Area by VTI 1.7 cm2    PV .0 ms    PV Max Velocity 0.7 m/s    PV Peak Gradient 2 mmHg    RVIDd 3.5 cm    RV Basal Dimension 3.9 cm    RV Free Wall Peak S' 10 cm/s    TAPSE 1.3 (A) 1.7 cm    Body Surface Area 2.13 m2    Fractional Shortening 2D 21 28 - 44 %    LV ESV Index A4C 31 mL/m2    LV EDV Index A4C 55 mL/m2    LV ESV Index A2C 24 mL/m2    LV EDV Index A2C 42 mL/m2    LVIDd Index 2.03 cm/m2    LVIDs Index 1.59 cm/m2    LV RWT Ratio 0.48     LV Mass 2D 137.2 88 - 224 g    LV Mass 2D Index 66.3 49 - 115 g/m2    MV E/A 0.48     E/E' Ratio (Averaged) 7.37     E/E' Lateral 8.29     E/E' Septal 6.44     LA Volume Index BP 12 (A) 16 - 34 ml/m2    LVOT Stroke Volume Index 14.6 mL/m2    LA Volume Index 2C 10 (A) 16 - 34 mL/m2    LA Volume Index 4C 13 (A) 16 - 34 mL/m2    LA Size Index 1.35 cm/m2    LA/AO Root Ratio 0.90     Ao Root Index 1.50 cm/m2    Ascending Aorta Index 1.59 cm/m2    AV Velocity Ratio 0.69     LVOT:AV VTI Index 0.56     LIZZY/BSA VTI 0.9 cm2/m2    LIZZY/BSA Peak Velocity 1.1 cm2/m2    MV:LVOT VTI Index 1.90    Anti-Xa, Unfractionated Heparin    Collection Time: 11/08/22  6:14 PM   Result Value Ref Range    Anti-XA Unfrac Heparin >1.1 (H) 0.3 - 0.7 IU/mL   POCT Glucose    Collection Time: 11/08/22  8:32 PM   Result Value Ref Range    POC Glucose 152 (H) 65 - 100 mg/dL    Performed by: Yassine    CBC with Auto Differential    Collection Time: 11/09/22  4:32 AM   Result Value Ref Range    WBC 12.5 (H) 4.3 - 11.1 K/uL    RBC 3.95 (L) 4.23 - 5.6 M/uL    Hemoglobin 12.2 (L) 13.6 - 17.2 g/dL    Hematocrit 38.1 (L) 41.1 - 50.3 %    MCV 96.5 82 - 102 FL    MCH 30.9 26.1 - 32.9 PG    MCHC 32.0 31.4 - 35.0 g/dL    RDW 13.9 11.9 - 14.6 %    Platelets 792 (L) 296 - 450 K/uL    MPV 10.6 9.4 - 12.3 FL    nRBC 0.00 0.0 - 0.2 K/uL    Differential Type AUTOMATED      Seg Neutrophils 77 43 - 78 %    Lymphocytes 15 13 - 44 %    Monocytes 6 4.0 - 12.0 %    Eosinophils % 1 0.5 - 7.8 %    Basophils 0 0.0 - 2.0 %    Immature Granulocytes 1 0.0 - 5.0 %    Segs Absolute 9.6 (H) 1.7 - 8.2 K/UL    Absolute Lymph # 1.9 0.5 - 4.6 K/UL    Absolute Mono # 0.7 0.1 - 1.3 K/UL    Absolute Eos # 0.1 0.0 - 0.8 K/UL    Basophils Absolute 0.0 0.0 - 0.2 K/UL    Absolute Immature Granulocyte 0.2 0.0 - 0.5 K/UL   Basic Metabolic Panel w/ Reflex to MG    Collection Time: 11/09/22  4:32 AM   Result Value Ref Range    Sodium 141 133 - 143 mmol/L    Potassium 3.9 3.5 - 5.1 mmol/L    Chloride 111 (H) 101 - 110 mmol/L    CO2 24 21 - 32 mmol/L    Anion Gap 6 2 - 11 mmol/L    Glucose 90 65 - 100 mg/dL    BUN 16 8 - 23 MG/DL    Creatinine 0.90 0.8 - 1.5 MG/DL    Est, Glom Filt Rate >60 >60 ml/min/1.73m2    Calcium 8.6 8.3 - 10.4 MG/DL   Anti-Xa, Unfractionated Heparin    Collection Time: 11/09/22  4:32 AM   Result Value Ref Range    Anti-XA Unfrac Heparin 0.40 0.3 - 0.7 IU/mL   POCT Glucose    Collection Time: 11/09/22  8:20 AM   Result Value Ref Range    POC Glucose 96 65 - 100 mg/dL    Performed by: River    POCT Glucose    Collection Time: 11/09/22 11:37 AM   Result Value Ref Range    POC Glucose 154 (H) 65 - 100 mg/dL    Performed by: [email or web address]    EKG 12 Lead    Collection Time: 11/09/22  1:53 PM   Result Value Ref Range    Ventricular Rate 108 BPM    Atrial Rate 108 BPM    P-R Interval 152 ms    QRS Duration 92 ms    Q-T Interval 340 ms    QTc Calculation (Bazett) 455 ms    P Axis 74 degrees    R Axis 70 degrees    T Axis 51 degrees    Diagnosis Sinus tachycardia    POCT Glucose    Collection Time: 11/09/22  4:40 PM   Result Value Ref Range    POC Glucose 119 (H) 65 - 100 mg/dL    Performed by:  Guerda    POCT Glucose    Collection Time: 11/09/22  8:46 PM   Result Value Ref Range    POC Glucose 122 (H) 65 - 100 mg/dL    Performed by: Hector    CBC with Auto Differential    Collection Time: 11/10/22  3:14 AM   Result Value Ref Range    WBC 12.1 (H) 4.3 - 11.1 K/uL    RBC 3.70 (L) 4.23 - 5.6 M/uL    Hemoglobin 11.7 (L) 13.6 - 17.2 g/dL    Hematocrit 35.5 (L) 41.1 - 50.3 %    MCV 95.9 82 - 102 FL    MCH 31.6 26.1 - 32.9 PG    MCHC 33.0 31.4 - 35.0 g/dL    RDW 13.8 11.9 - 14.6 %    Platelets 457 (L) 223 - 450 K/uL    MPV 10.8 9.4 - 12.3 FL    nRBC 0.00 0.0 - 0.2 K/uL    Differential Type AUTOMATED      Seg Neutrophils 79 (H) 43 - 78 %    Lymphocytes 13 13 - 44 %    Monocytes 7 4.0 - 12.0 %    Eosinophils % 0 (L) 0.5 - 7.8 % Basophils 0 0.0 - 2.0 %    Immature Granulocytes 1 0.0 - 5.0 %    Segs Absolute 9.4 (H) 1.7 - 8.2 K/UL    Absolute Lymph # 1.6 0.5 - 4.6 K/UL    Absolute Mono # 0.8 0.1 - 1.3 K/UL    Absolute Eos # 0.1 0.0 - 0.8 K/UL    Basophils Absolute 0.0 0.0 - 0.2 K/UL    Absolute Immature Granulocyte 0.1 0.0 - 0.5 K/UL   POCT Glucose    Collection Time: 11/10/22  8:59 AM   Result Value Ref Range    POC Glucose 136 (H) 65 - 100 mg/dL    Performed by: WilocityitaPCT    POCT Glucose    Collection Time: 11/10/22 11:35 AM   Result Value Ref Range    POC Glucose 111 (H) 65 - 100 mg/dL    Performed by: [email or web address]      Lab Results   Component Value Date/Time    CHOL 148 06/02/2021 03:15 PM    HDL 32 06/02/2021 03:15 PM    VLDL 33 06/02/2021 03:15 PM         ASSESSMENT and PLAN      42-year-old gentleman with pulmonary embolism. His sinus tachycardia is compensatory secondary to his pulmonary embolism and I anticipate it will improve with time. We will add low-dose Lopressor in the interim and follow with you            Thank you for allowing me to participate in this patient's care. Please call or contact me if there are any questions or concerns regarding the above.       Cuba Herbert MD  11/10/22  2:52 PM

## 2022-11-10 NOTE — PROGRESS NOTES
Pt is laying in bed with no complaints of pain or discomfort at this time. Pt is alert and times 4. Pt is on room air with even and unlabored respirations. Pt is made aware to alert staff when needing assistance.

## 2022-11-11 ENCOUNTER — APPOINTMENT (OUTPATIENT)
Dept: GENERAL RADIOLOGY | Age: 67
DRG: 163 | End: 2022-11-11
Payer: MEDICARE

## 2022-11-11 VITALS
BODY MASS INDEX: 33.12 KG/M2 | HEART RATE: 87 BPM | DIASTOLIC BLOOD PRESSURE: 77 MMHG | TEMPERATURE: 98.4 F | OXYGEN SATURATION: 94 % | HEIGHT: 67 IN | RESPIRATION RATE: 17 BRPM | SYSTOLIC BLOOD PRESSURE: 113 MMHG | WEIGHT: 211 LBS

## 2022-11-11 LAB
ANION GAP SERPL CALC-SCNC: 6 MMOL/L (ref 2–11)
BASOPHILS # BLD: 0.1 K/UL (ref 0–0.2)
BASOPHILS NFR BLD: 1 % (ref 0–2)
BUN SERPL-MCNC: 15 MG/DL (ref 8–23)
CALCIUM SERPL-MCNC: 9.8 MG/DL (ref 8.3–10.4)
CHLORIDE SERPL-SCNC: 106 MMOL/L (ref 101–110)
CO2 SERPL-SCNC: 26 MMOL/L (ref 21–32)
CREAT SERPL-MCNC: 1 MG/DL (ref 0.8–1.5)
DIFFERENTIAL METHOD BLD: ABNORMAL
EOSINOPHIL # BLD: 0.1 K/UL (ref 0–0.8)
EOSINOPHIL NFR BLD: 1 % (ref 0.5–7.8)
ERYTHROCYTE [DISTWIDTH] IN BLOOD BY AUTOMATED COUNT: 13.9 % (ref 11.9–14.6)
GLUCOSE BLD STRIP.AUTO-MCNC: 111 MG/DL (ref 65–100)
GLUCOSE BLD STRIP.AUTO-MCNC: 97 MG/DL (ref 65–100)
GLUCOSE SERPL-MCNC: 114 MG/DL (ref 65–100)
HCT VFR BLD AUTO: 40.1 % (ref 41.1–50.3)
HGB BLD-MCNC: 13.1 G/DL (ref 13.6–17.2)
IMM GRANULOCYTES # BLD AUTO: 0.1 K/UL (ref 0–0.5)
IMM GRANULOCYTES NFR BLD AUTO: 1 % (ref 0–5)
LYMPHOCYTES # BLD: 1.6 K/UL (ref 0.5–4.6)
LYMPHOCYTES NFR BLD: 15 % (ref 13–44)
MCH RBC QN AUTO: 31.1 PG (ref 26.1–32.9)
MCHC RBC AUTO-ENTMCNC: 32.7 G/DL (ref 31.4–35)
MCV RBC AUTO: 95.2 FL (ref 82–102)
MONOCYTES # BLD: 0.6 K/UL (ref 0.1–1.3)
MONOCYTES NFR BLD: 5 % (ref 4–12)
NEUTS SEG # BLD: 8.5 K/UL (ref 1.7–8.2)
NEUTS SEG NFR BLD: 77 % (ref 43–78)
NRBC # BLD: 0 K/UL (ref 0–0.2)
PLATELET # BLD AUTO: 162 K/UL (ref 150–450)
PMV BLD AUTO: 9.8 FL (ref 9.4–12.3)
POTASSIUM SERPL-SCNC: 4.1 MMOL/L (ref 3.5–5.1)
RBC # BLD AUTO: 4.21 M/UL (ref 4.23–5.6)
SERVICE CMNT-IMP: ABNORMAL
SERVICE CMNT-IMP: NORMAL
SODIUM SERPL-SCNC: 138 MMOL/L (ref 133–143)
WBC # BLD AUTO: 10.9 K/UL (ref 4.3–11.1)

## 2022-11-11 PROCEDURE — 97530 THERAPEUTIC ACTIVITIES: CPT

## 2022-11-11 PROCEDURE — 80048 BASIC METABOLIC PNL TOTAL CA: CPT

## 2022-11-11 PROCEDURE — 97112 NEUROMUSCULAR REEDUCATION: CPT

## 2022-11-11 PROCEDURE — 6370000000 HC RX 637 (ALT 250 FOR IP): Performed by: NURSE PRACTITIONER

## 2022-11-11 PROCEDURE — 6370000000 HC RX 637 (ALT 250 FOR IP): Performed by: INTERNAL MEDICINE

## 2022-11-11 PROCEDURE — 94640 AIRWAY INHALATION TREATMENT: CPT

## 2022-11-11 PROCEDURE — 85025 COMPLETE CBC W/AUTO DIFF WBC: CPT

## 2022-11-11 PROCEDURE — 71045 X-RAY EXAM CHEST 1 VIEW: CPT

## 2022-11-11 PROCEDURE — 6370000000 HC RX 637 (ALT 250 FOR IP): Performed by: FAMILY MEDICINE

## 2022-11-11 PROCEDURE — 97535 SELF CARE MNGMENT TRAINING: CPT

## 2022-11-11 PROCEDURE — 94760 N-INVAS EAR/PLS OXIMETRY 1: CPT

## 2022-11-11 PROCEDURE — 82962 GLUCOSE BLOOD TEST: CPT

## 2022-11-11 PROCEDURE — 36415 COLL VENOUS BLD VENIPUNCTURE: CPT

## 2022-11-11 RX ORDER — METOPROLOL SUCCINATE 25 MG/1
12.5 TABLET, EXTENDED RELEASE ORAL 2 TIMES DAILY
Qty: 30 TABLET | Refills: 0 | Status: SHIPPED | OUTPATIENT
Start: 2022-11-11

## 2022-11-11 RX ADMIN — DIAZEPAM 5 MG: 5 TABLET ORAL at 09:51

## 2022-11-11 RX ADMIN — APIXABAN 10 MG: 5 TABLET, FILM COATED ORAL at 09:52

## 2022-11-11 RX ADMIN — TIOTROPIUM BROMIDE INHALATION SPRAY 2 PUFF: 3.12 SPRAY, METERED RESPIRATORY (INHALATION) at 08:27

## 2022-11-11 RX ADMIN — DOXYCYCLINE HYCLATE 100 MG: 100 CAPSULE ORAL at 09:51

## 2022-11-11 RX ADMIN — FAMOTIDINE 20 MG: 20 TABLET, FILM COATED ORAL at 09:52

## 2022-11-11 RX ADMIN — METOPROLOL SUCCINATE 12.5 MG: 25 TABLET, EXTENDED RELEASE ORAL at 09:51

## 2022-11-11 RX ADMIN — ASPIRIN 81 MG: 81 TABLET ORAL at 09:52

## 2022-11-11 NOTE — PROGRESS NOTES
Discharge instruction and medication list reviewed and given to patient. Verbalizes understanding. Prescription reviewed and given. Peripheral IV removed with cath tip intact post removal X2. Both exit site pink. Personal belonging with patient (see DC papers). Follow up appointments reviewed. Verbalizes understanding. No personal belongings or home medication to be returned back to pt prior to discharged. Discharged home via wheelchair and CNA assisting with transport.

## 2022-11-11 NOTE — PROGRESS NOTES
Pt resting comfortably in bed at this time. Respirations are even and unlabored on RA, no signs or symptoms of distress noted at this time. No complaints of pain at this time. Call light in reach, safety measures in place, pt instructed to call with needs. Will continue to monitor.

## 2022-11-11 NOTE — PROGRESS NOTES
Pt resting comfortably in bed at this time. Respirations are even and unlabored on RA, no signs or symptoms of distress noted. Pt is alert and oriented x4. No complaints of pain at this time. Call light in reach, safety measures in place. Will continue to monitor and prepare to give report to oncoming shift.

## 2022-11-11 NOTE — PROGRESS NOTES
Pt resting comfortably in bed at this time. Respirations are even and unlabored on RA, no signs or symptoms of distress noted. Pt is alert and oriented x4. No complaints of pain at this time. Call light in reach, safety measures in place, pt instructed to call with needs. Will continue to monitor.

## 2022-11-11 NOTE — DISCHARGE SUMMARY
Hospitalist Discharge Summary   Admit Date:  2022  3:59 PM   DC Note date: 2022  Name:  Taras Blanchard   Age:  75 y.o. Sex:  male  :  1955   MRN:  [id number]   Room:  [facility]  PCP:  NOT ON FILE    Presenting Complaint: Shortness of Breath     Initial Admission Diagnosis: Shortness of breath [R06.02]  Other acute pulmonary embolism without acute cor pulmonale (HCC) [I26.99]  Acute pulmonary embolism, unspecified pulmonary embolism type, unspecified whether acute cor pulmonale present ([facility] [address] [I26.99]     Problem List for this Hospitalization (present on admission):    Principal Problem:    Acute pulmonary embolism, unspecified pulmonary embolism type, unspecified whether acute cor pulmonale present ([facility] [address]  Active Problems:    Sinus tachycardia    Dizziness    PAD (peripheral artery disease) (HCC)    Atherosclerosis of both carotid arteries    Obesity (BMI 35.0-39.9 without comorbidity)    Essential hypertension    Coronary artery disease involving native coronary artery of native heart without angina pectoris    Mixed hyperlipidemia  Resolved Problems:    * No resolved hospital problems. [facility] Course:     Mr. Alex Fink is a  74 y/o WM with a h/o  PAD, HTN, obesity, CAD, HLD who was admitted  on 22 with bilateral PE/ RLE DVT. Recently resumed on testosterone supplementation. Also noted he is not very active due to chronic back pain and smokes cigars. No prior VTE. CT chest with large bilateral PE. He was started on a heparin drip. IR was consulted and he underwent bilateral PA thrombectomy on . US with RLE DVT but no intervention required for this per IR.  transitioned to Eliquis on 22. He will have hematology referral at discharge. He is given albuterol/ spiriva due to smoking history and advised cessation. He has a mild anemia/ thrombocytopenia that will need CBC followup. He developed dizziness and sinus tachycardia into 140s with exertion.       ECHO\"  Result status: Final result       Left Ventricle: Mildly reduced left ventricular systolic function with a visually estimated EF of 45 - 50%. Left ventricle size is normal. Normal wall thickness. Mild global hypokinesis present. Abnormal diastolic function. Right Ventricle: Right ventricle is mildly dilated. Mildly reduced systolic function. Aortic Valve: Tricuspid valve. Mild sclerosis of the aortic valve cusp. Mitral Valve: Mild regurgitation. Technical qualifiers: Color flow Doppler was performed and pulse wave and/or continuous wave Doppler was performed. Contrast used: Definity  \"    He was seen by cardiology who added lower dose metoprolol. He developed mild fever. Followup CXR negative. Leukocytosis resolved. UA pending. Flu/covid19 negative. He is on a course of 5 days doxycycline. His fever may be clot burden related as well. Discharge plans are to home. Disposition: home         Diet: ADULT DIET; Regular; Low Fat/Low Chol/High Fiber/YOVANA  Code Status: Full Code    Follow Ups:   Follow-up Information     [facility] Follow up on 12/9/2022. Specialty: Oncology  Why: 9 am  Contact information:  [address] [place] [id number] [phone]           physician services Follow up. Why: please call physician services at [phone] if you have not   received a pcp within 48 hours           NOT ON FILE . [facility] Follow up in 1 month(s). Specialty: Cardiology  Contact information:  [address] [facility]  [phone]                     Time spent in patient discharge and coordination 35  minutes. Follow up labs/diagnostics (ultimately defer to outpatient provider):      BMP, CBC     Plan was discussed with patient. All questions answered. Patient was stable at time of discharge.   Instructions given to call a physician or return if any concerns. Current Discharge Medication List        START taking these medications    Details   metoprolol succinate (TOPROL XL) 25 MG extended release tablet Take 0.5 tablets by mouth in the morning and at bedtime  Qty: 30 tablet, Refills: 0      nicotine (NICODERM CQ) 7 MG/24HR Place 1 patch onto the skin every 24 hours  Qty: 30 patch, Refills: 0      doxycycline hyclate (VIBRAMYCIN) 100 MG capsule Take 1 capsule by mouth every 12 hours for 9 doses  Qty: 9 capsule, Refills: 0      tiotropium (SPIRIVA RESPIMAT) 2.5 MCG/ACT AERS inhaler Inhale 2 puffs into the lungs daily  Qty: 1 each, Refills: 0      albuterol sulfate HFA (PROVENTIL;VENTOLIN;PROAIR) 108 (90 Base) MCG/ACT inhaler Inhale 2 puffs into the lungs every 4 hours as needed for Wheezing or Shortness of Breath  Qty: 1 each, Refills: 0      apixaban (ELIQUIS) 5 MG TABS tablet Take 10 mg every 12 hours for 7 days (last dose on 11-15-22 at 9 PM), then decrease to 5 mg every 12 hours on 11-16-22 at 9 AM  Qty: 60 tablet, Refills: 0           CONTINUE these medications which have NOT CHANGED    Details   diazePAM (VALIUM) 5 MG tablet Take one a day  Qty: 30 tablet, Refills: 3    Associated Diagnoses: Seizure ([address]      aspirin 81 MG EC tablet Take 81 mg by mouth daily      atorvastatin (LIPITOR) 80 MG tablet TAKE 1 TABLET EVERY NIGHT      ezetimibe (ZETIA) 10 MG tablet Take 10 mg by mouth daily      traMADol (ULTRAM) 50 MG tablet Take 50 mg by mouth every 6 hours as needed.            STOP taking these medications       Testosterone Cypionate 200 MG/ML SOLN Comments:   Reason for Stopping:         lisinopril (PRINIVIL;ZESTRIL) 20 MG tablet Comments:   Reason for Stopping:         naproxen sodium (ANAPROX) 220 MG tablet Comments:   Reason for Stopping:         rivaroxaban (XARELTO) 2.5 MG TABS tablet Comments:   Reason for Stopping:               Procedures done this admission:  * No surgery found *    Consults this admission:  IP CONSULT TO INTERVENTIONAL RADIOLOGY  IP CONSULT TO CARDIOLOGY    Echocardiogram results:  11/07/22    TRANSTHORACIC ECHOCARDIOGRAM (TTE) COMPLETE (CONTRAST/BUBBLE/3D PRN) 11/08/2022  4:45 PM (Final)    Interpretation Summary    Left Ventricle: Mildly reduced left ventricular systolic function with a visually estimated EF of 45 - 50%. Left ventricle size is normal. Normal wall thickness. Mild global hypokinesis present. Abnormal diastolic function. Right Ventricle: Right ventricle is mildly dilated. Mildly reduced systolic function. Aortic Valve: Tricuspid valve. Mild sclerosis of the aortic valve cusp. Mitral Valve: Mild regurgitation. Technical qualifiers: Color flow Doppler was performed and pulse wave and/or continuous wave Doppler was performed. Contrast used: Definity. Signed by: Martell Duane, MD on 11/8/2022  4:45 PM      Diagnostic Imaging/Tests:   XR CHEST PORTABLE    Result Date: 11/11/2022  No acute cardiopulmonary abnormality. XR CHEST PORTABLE    Result Date: 11/7/2022  No consolidation. CT CHEST PULMONARY EMBOLISM W CONTRAST    Result Date: 11/7/2022  1. Acute large bilateral pulmonary emboli. 2. Small right peripheral lung pulmonary infarctions. 3. Coronary artery and aortic calcifications. 4. Probable COPD. DC5 The critical results contained in this report were communicated to Dr. Angela Henderson by Dr Fran Gonzalez on the phone at 5:30 PM. Critical results were communicated as outlined in Section II.C.2.a.i of the ACR Practice Guideline for Communication of Diagnostic Imaging Findings. [facility]    Result Date: 11/8/2022  Venography demonstrates no thrombus in the right common iliac vein or lower inferior vena cava. Pulmonary arteriography after thrombectomy demonstrates a small, nonocclusive, thrombus in one of the left lower lobe pulmonary artery segmental branches. Final right pulmonary angiography demonstrates no residual thrombus.  Plan:  2 hours bedrest.  Transition from heparin infusion to oral anticoagulation later tonight or tomorrow. Lower extremity venous ultrasound was performed after this procedure. Duplex ultrasound demonstrates occlusive thrombus in the right femoral, popliteal, posterior tibial, and peroneal veins. However, the patient has no right lower extremity swelling. Recommend minimum 6 months oral anticoagulation. _______________________________________________________________ PROCEDURE SUMMARY: - Venous access with ultrasound guidance - Pelvic venography. - Bilateral main pulmonary angiography - Superselective pulmonary angiography: Right superior trunk pulmonary angiography - Pulmonary arterial interventions as described below - Additional procedure(s): Inari FlowSaver with immediate blood return PROCEDURE DETAILS: Pre-procedure Consent:  Informed written and oral consent for the procedure was obtained after explanation of risks (including, but not limitted to:  hemorrhage, vascular injury, infection) benefits, and alternatives. The patient's questions were answered to his/her satisfaction. He/She stated understanding and requested that we proceed. Final verification:  A time-out identifying the patient and planned procedure was performed prior to this procedure. Preparation (MIPS):  Maximal sterile barrier technique (including:  Sterile Ultrasound probe cover, Sterile Ultrasound gel, cap, mask, Sterile Gown, Sterile Gloves, Sterile Drape, hand hygiene, and 2% chlorhexidine cutaneous antisepsis) was used. Anesthesia/sedation Level of anesthesia/sedation:  Moderate sedation (conscious sedation) Anesthesia/sedation administered by: Independent trained observer under attending supervision with continuous monitoring of the patient?s level of consciousness and physiologic status Total intra-service sedation time (minutes):  110 Access Local anesthesia was administered.   The right great saphenous vein insertion and common femoral vein was sonographically evaluated and determined to be patent. Real time ultrasound was used to visualize needle entry into the vessel and a permanent image was stored. Initially, an 8-Citizen of [place] sheath was placed. Eventually, 24 Fr Inari Sheath sheath was placed. Vein accessed:  Right Great Saphenous Vein Access technique:  Micropuncture set with 21 gauge needle Pelvic venography The pelvic venous system was catheterized with an 8-Citizen of [place] sheath. Indication for angiography:  Diagnostic angiography - There was no prior catheter-based angiographic study available and a full diagnostic study was performed. The decision to intervene was based on the diagnostic study. Concern for right pelvic thrombus given the recent pulmonary embolism. Vessel catheterized:  Right external iliac vein Findings:  Widely patent right external iliac vein and common iliac vein. No thrombus in the peripheral most portion of the inferior vena cava. Some contrast does reflux into the left common iliac vein, demonstrating patency. Multiple catheters and wires were utilized in an attempt to traverse the right heart and access the pulmonary outflow tract. 2  right ventriculograms were obtained in order to confirm appropriate location and to exclude a right ventricular thrombus. Right ventriculogram Vessel catheterized:  Right ventricle was accessed with a pigtail catheter Findings:  No right ventricle thrombus identified. Sluggish flow through the pulmonary outflow tract and into the main right and left pulmonary arteries. Eventually, a Bentson wire was successfully passed through the right heart using a 6-Citizen of [place]  catheter. The  catheter was removed. An occlusion balloon catheter was advanced to the atriocaval junction. The balloon was inflated with 8 cc of air and then gently advanced over the Bentson wire into the left main pulmonary artery.   The balloon catheter met no resistance, confirming that the wire passed was safe and appropriate without in tangle and a chordee tendinae. The Bentson wire was replaced with an exchange length Amplatz wire with the tip positioned in a left lower lobe pulmonary artery. Due to the patient's worsening hypoxia, pulmonary angiography was not performed at this time. Instead, the Triever 24 was quickly advanced and thrombectomy was performed. Mechanical Thrombectomy/Aspiration Thrombectomy location : Left lower lobe and left main pulmonary artery Thrombectomy device:  Inari FlowTriever 24 Thrombectomy type:  Manual Aspiration A small volume of acute thrombus was returned. Post-intervention angiography:  Vessel catheterized:  Left main pulmonary artery Findings:  Excellent anterograde flow into the upper lobe, lingula, and lower lobe with the exception of one segmental branch in the lower lobe. Due to its distal location, and the patient's worsening hypoxia, heart rate, and blood pressure I decided to forego any further left lung treatment and to focus on the right lung. Using the exchange length Amplatz wire and the super pigtail catheter, the right lower lobe pulmonary artery was accessed. The pigtail catheter was removed, the dilator was replaced and the Triever 24 for catheter was quickly advanced into the right lower lobe pulmonary artery. Mechanical thrombectomy was then immediately performed. Mechanical thrombectomy location : Right lower lobe and main pulmonary artery Mechanical thrombectomy device:  Inari FlowTriever 24 Mechanical thrombectomy type:  Manual Aspiration Large volume of acute and chronic thrombus was returned. Once no additional thrombus could be aspirated, a right lower lobe pulmonary arteriogram was performed. Vessel catheterized:  Right lower lobe pulmonary artery Findings:  Excellent flow in the right lower lobe and right middle lobe pulmonary artery branches. Although incompletely visualized, there appeared to be occlusive thrombus in the right upper lobe pulmonary artery.  The Flynn Rosas was carefully retracted until the tip intubated the superior trunk pulmonary artery. The Amplatz wire was gently advanced and the aspiration catheter was seated into the superior trunk. Vessel catheterized:  Superior trunk, right pulmonary artery Findings:  Occlusive thrombus in the right upper lobe pulmonary artery. Once again, aspiration thrombectomy was performed. Mechanical Thrombectomy/Aspiration Thrombectomy location : Right upper lobe pulmonary artery Thrombectomy device:  Inari FlowTriever 24 Thrombectomy type:  Manual Aspiration A sizable thrombus was returned. After this, a final right upper lobe pulmonary arteriogram was performed. Vessel catheterized:  Right upper lobe pulmonary artery Findings:  No residual thrombus identified in the right upper lobe pulmonary artery distribution. Reflux into the main pulmonary artery again confirms its patency with excellent flow into the right middle lobe and lower lobe pulmonary artery branches. At this point, the [place]13 and wire were removed. Blood Conservation:  Alisson Feather was utilized after every aspiration event. Closure The sheath was removed. Hemostasis was achieved with a pursestring suture and Inari FlowStasis device. A sterile dressing was applied. Contrast Contrast agent:  Isovue-300 Contrast volume (mL):  150 Radiation Dose Reference Air Kerma (Lorry Lied):  2138 mGy Dose Area Product/Kerma Area Product (DAP/[place]O/PKA): 47,660.26cGy-cm2 Fluoroscopy Exposure Time:  25 minutes 18 seconds     Fluorographic Images:  4 pulmonary arteriograms, 1 right ventriculogram, 1 pelvic venogram Additional Details Equipment details:  None Specimens removed:  None Estimated blood loss (mL):  11-50 Standardized report: [email or web address] Attestation Signer name: Hyacinth Echeverria M.D. I attest that I performed the entire procedure. I reviewed the stored images and agree with the report as written.      Vascular duplex lower extremity venous bilateral    Result Date: 11/8/2022  Occlusive DVT in the right femoral, popliteal, posterior tibial and peroneal veins. Labs: Results:       BMP, Mg, Phos Recent Labs     11/09/22 0432 11/11/22  0846    138   K 3.9 4.1   * 106   CO2 24 26   ANIONGAP 6 6   BUN 16 15   CREATININE 0.90 1.00   LABGLOM >60 >60   CALCIUM 8.6 9.8   GLUCOSE 90 114*      CBC Recent Labs     11/09/22  0432 11/10/22  0314 11/11/22  0846   WBC 12.5* 12.1* 10.9   RBC 3.95* 3.70* 4.21*   HGB 12.2* 11.7* 13.1*   HCT 38.1* 35.5* 40.1*   MCV 96.5 95.9 95.2   MCH 30.9 31.6 31.1   MCHC 32.0 33.0 32.7   RDW 13.9 13.8 13.9   * 139* 162   MPV 10.6 10.8 9.8   NRBC 0.00 0.00 0.00   SEGS 77 79* 77   LYMPHOPCT 15 13 15   EOSRELPCT 1 0* 1   MONOPCT 6 7 5   BASOPCT 0 0 1   IMMGRAN 1 1 1   SEGSABS 9.6* 9.4* 8.5*   LYMPHSABS 1.9 1.6 1.6   EOSABS 0.1 0.1 0.1   MONOSABS 0.7 0.8 0.6   BASOSABS 0.0 0.0 0.1   ABSIMMGRAN 0.2 0.1 0.1      LFT No results for input(s): BILITOT, BILIDIR, ALKPHOS, AST, ALT, PROT, LABALBU, GLOB in the last 72 hours.    Cardiac  Lab Results   Component Value Date/Time    NTPROBNP 4,319 11/07/2022 09:36 PM    TROPHS 1,087.4 11/07/2022 09:36 PM      Coags Lab Results   Component Value Date/Time    PROTIME 15.6 11/07/2022 06:33 PM    INR 1.2 11/07/2022 06:33 PM    APTT 167.5 11/07/2022 06:33 PM      A1c Lab Results   Component Value Date/Time    LABA1C 6.1 11/08/2022 03:21 AM     11/08/2022 03:21 AM      Lipids Lab Results   Component Value Date/Time    CHOL 148 06/02/2021 03:15 PM    LDLCALC 83 06/02/2021 03:15 PM    LABVLDL 29 03/09/2020 12:04 PM    HDL 32 06/02/2021 03:15 PM    TRIG 193 06/02/2021 03:15 PM      Thyroid  No results found for: Maxx Sequeira     Most Recent UA Lab Results   Component Value Date/Time    GLUCOSEU Negative 11/07/2022 04:29 PM    BLOODU Negative 11/07/2022 04:29 PM        Recent Labs     11/07/22  2346 11/07/22  1419   CULTURE NO GROWTH 3 DAYS NO GROWTH 4 DAYS       All Labs from Last 24 Hrs:  Recent Results (from the past 24 hour(s))   POCT Glucose    Collection Time: 11/10/22 11:35 AM   Result Value Ref Range    POC Glucose 111 (H) 65 - 100 mg/dL    Performed by: DemaritaPCT    POCT Glucose    Collection Time: 11/10/22  4:30 PM   Result Value Ref Range    POC Glucose 113 (H) 65 - 100 mg/dL    Performed by: DemaritaPCT    POCT Glucose    Collection Time: 11/10/22  8:50 PM   Result Value Ref Range    POC Glucose 110 (H) 65 - 100 mg/dL    Performed by: GamalielPCADEN    POCT Glucose    Collection Time: 11/11/22  7:53 AM   Result Value Ref Range    POC Glucose 97 65 - 100 mg/dL    Performed by:  EmrePCT    Basic Metabolic Panel    Collection Time: 11/11/22  8:46 AM   Result Value Ref Range    Sodium 138 133 - 143 mmol/L    Potassium 4.1 3.5 - 5.1 mmol/L    Chloride 106 101 - 110 mmol/L    CO2 26 21 - 32 mmol/L    Anion Gap 6 2 - 11 mmol/L    Glucose 114 (H) 65 - 100 mg/dL    BUN 15 8 - 23 MG/DL    Creatinine 1.00 0.8 - 1.5 MG/DL    Est, Glom Filt Rate >60 >60 ml/min/1.73m2    Calcium 9.8 8.3 - 10.4 MG/DL   CBC with Auto Differential    Collection Time: 11/11/22  8:46 AM   Result Value Ref Range    WBC 10.9 4.3 - 11.1 K/uL    RBC 4.21 (L) 4.23 - 5.6 M/uL    Hemoglobin 13.1 (L) 13.6 - 17.2 g/dL    Hematocrit 40.1 (L) 41.1 - 50.3 %    MCV 95.2 82 - 102 FL    MCH 31.1 26.1 - 32.9 PG    MCHC 32.7 31.4 - 35.0 g/dL    RDW 13.9 11.9 - 14.6 %    Platelets 851 867 - 917 K/uL    MPV 9.8 9.4 - 12.3 FL    nRBC 0.00 0.0 - 0.2 K/uL    Differential Type AUTOMATED      Seg Neutrophils 77 43 - 78 %    Lymphocytes 15 13 - 44 %    Monocytes 5 4.0 - 12.0 %    Eosinophils % 1 0.5 - 7.8 %    Basophils 1 0.0 - 2.0 %    Immature Granulocytes 1 0.0 - 5.0 %    Segs Absolute 8.5 (H) 1.7 - 8.2 K/UL    Absolute Lymph # 1.6 0.5 - 4.6 K/UL    Absolute Mono # 0.6 0.1 - 1.3 K/UL    Absolute Eos # 0.1 0.0 - 0.8 K/UL    Basophils Absolute 0.1 0.0 - 0.2 K/UL    Absolute Immature Granulocyte 0.1 0.0 - 0.5 K/UL       Allergies   Allergen Reactions    Methocarbamol Hives    Ciprofloxacin Nausea And Vomiting     Immunization History   Administered Date(s) Administered    Influenza, FLUARIX, FLULAVAL, FLUZONE (age 10 mo+) AND AFLURIA, (age 1 y+), PF, 0.5mL 11/01/2018, 09/10/2019       Recent Vital Data:  Patient Vitals for the past 24 hrs:   Temp Pulse Resp BP SpO2   11/11/22 0951 -- 91 -- 120/78 --   11/11/22 0827 -- 91 15 -- 94 %   11/11/22 0741 98.2 °F (36.8 °C) 84 18 120/78 94 %   11/11/22 0503 -- 88 -- -- --   11/11/22 0410 98.4 °F (36.9 °C) 84 18 124/83 94 %   11/11/22 0021 99 °F (37.2 °C) 87 18 137/82 93 %   11/10/22 2200 98.4 °F (36.9 °C) -- -- -- --   11/10/22 2055 -- (!) 108 -- -- --   11/10/22 1945 -- (!) 111 -- 106/74 --   11/10/22 1944 -- (!) 105 -- 106/81 --   11/10/22 1943 (!) 100.8 °F (38.2 °C) 99 20 123/87 93 %   11/10/22 1509 -- (!) 117 -- 107/83 98 %   11/10/22 1507 -- 99 -- 131/89 97 %   11/10/22 1506 99.3 °F (37.4 °C) 99 18 131/83 96 %       Oxygen Therapy  SpO2: 94 %  Pulse via Oximetry: 96 beats per minute  Pulse Oximeter Device Mode: Intermittent  O2 Device: None (Room air)  Skin Assessment: Clean, dry, & intact  O2 Flow Rate (L/min): 2 L/min  End Tidal CO2: 22 (%)  O2 Delivery Method: CO2 nasal cannula    Estimated body mass index is 33.05 kg/m² as calculated from the following:    Height as of this encounter: 5' 7\" (1.702 m). Weight as of this encounter: 211 lb (95.7 kg). No intake or output data in the 24 hours ending 11/11/22 1054      Physical Exam:    General:    Well nourished. No overt distress, alert, pleasant   CV:   RRR. No m/r/g. No JVD, no edema   Lungs:   Mild diffuse congestion   Abdomen:   Soft, nontender, nondistended. Extremities: Warm and dry. No cyanosis or clubbing. No edema. Skin:     No rashes. Normal coloration  Neuro:  grossly intact. Psych:  Normal mood and affect. Signed:  Channing De La Garza MD    Part of this note may have been written by using a voice dictation software.   The note has been proof read but may still contain some grammatical/other typographical errors.

## 2022-11-11 NOTE — PROGRESS NOTES
ACUTE PHYSICAL THERAPY GOALS:   (Developed with and agreed upon by patient and/or caregiver.)  (1.) Benita Boor  will move from supine to sit and sit to supine , scoot up and down, and roll side to side with MODIFIED INDEPENDENCE within 7 treatment day(s). (2.) Benita Boor will transfer from bed to chair and chair to bed with MODIFIED INDEPENDENCE using the least restrictive device within 7 treatment day(s). (3.) Benita Pratt will ambulate with MODIFIED INDEPENDENCE for 300 feet with the least restrictive device within 7 treatment day(s). (4.) Benita Boor will perform standing static and dynamic balance activities x 25 minutes with MODIFIED INDEPENDENCE to improve safety and activity tolerance within 7 treatment day(s). (5.) Benita Boor will ascend and descend 6 stairs using one hand rail(s) with MODIFIED INDEPENDENCE to improve functional mobility and safety within 7 treatment day(s). (6.) Benita Boor will perform therapeutic exercises x 20 min for HEP with INDEPENDENCE to improve strength, endurance, and functional mobility within 7 treatment day(s).      PHYSICAL THERAPY: Daily Note AM   (Link to Caseload Tracking: PT Visit Days : 2  Time In/Out PT Charge Capture  Rehab Caseload Tracker  Orders    Benita Pratt is a 75 y.o. male   PRIMARY DIAGNOSIS: Acute pulmonary embolism, unspecified pulmonary embolism type, unspecified whether acute cor pulmonale present (HCC)  Shortness of breath [R06.02]  Other acute pulmonary embolism without acute cor pulmonale (HCC) [I26.99]  Acute pulmonary embolism, unspecified pulmonary embolism type, unspecified whether acute cor pulmonale present ([facility] [I26.99]       Inpatient: Payor: Milagros Mojica / Plan: [employer] G[employer]US HMO / Product Type: *No Product type* /     ASSESSMENT:     REHAB RECOMMENDATIONS:   Recommendation to date pending progress:  Setting:  No further skilled therapy after discharge from hospital-pt does not want HHPT at discharge    Equipment:    None-declines need for RW     ASSESSMENT:  Mr. Timi Laguerre presents supine, agreeable to mobility, states going home later today. Pt with improved HR with mobility, resting 90s, 126 with mobility. Pt ambulated in hallway without assistive device for 250', 2 standing rest breaks with activity. Pt has R sided trunk lean with ambulation; has leg length discrepancy and lift shoe; reports this is baseline. Pt declined need for HHPT or walker at this time, PT to cont to follow for acute care needs.        SUBJECTIVE:   Mr. Timi Laguerre states, \"I am going home today\"     Social/Functional Lives With: Spouse  Home Layout: One level  Home Access: Stairs to enter with rails  Entrance Stairs - Number of Steps: 5  Has the patient had two or more falls in the past year or any fall with injury in the past year?: Yes  Ambulation Assistance: Independent  Transfer Assistance: Independent  Active : Yes  OBJECTIVE:     PAIN: [place] / O2: Joseph Porter / Zan Goldstein / Igor Quinteros:   Pre Treatment:   Pain Assessment: None - Denies Pain      Post Treatment: 0/10 Vitals        Oxygen RA    None    RESTRICTIONS/PRECAUTIONS:        MOBILITY: I Mod I S SBA CGA Min Mod Max Total  NT x2 Comments:   Bed Mobility    Rolling [x] [] [] [] [] [] [] [] [] [] []    Supine to Sit [x] [] [] [] [] [] [] [] [] [] []    Scooting [x] [] [] [] [] [] [] [] [] [] []    Sit to Supine [x] [] [] [] [] [] [] [] [] [] []    Transfers    Sit to Stand [x] [] [] [] [] [] [] [] [] [] []    Bed to Chair [] [] [] [] [] [] [] [] [] [x] []    Stand to Sit [x] [] [] [] [] [] [] [] [] [] []     [] [] [] [] [] [] [] [] [] [] []    I=Independent, Mod I=Modified Independent, S=Supervision, SBA=Standby Assistance, CGA=Contact Guard Assistance,   Min=Minimal Assistance, Mod=Moderate Assistance, Max=Maximal Assistance, Total=Total Assistance, NT=Not Tested    BALANCE: Good Fair+ Fair Fair- Poor NT Comments   Sitting Static [x] [] [] [] [] []    Sitting Dynamic [x] [] [] [] [] []              Standing Static [] [x] [] [] [] []    Standing Dynamic [] [x] [] [] [] []      GAIT: I Mod I S SBA CGA Min Mod Max Total  NT x2 Comments:   Level of Assistance [] [] [] [x] [] [] [] [] [] [] []    Distance 250  feet    DME N/A    Gait Quality Decreased step clearance, Decreased step length, and Trunk sway increased    Weightbearing Status      Stairs      I=Independent, Mod I=Modified Independent, S=Supervision, SBA=Standby Assistance, CGA=Contact Guard Assistance,   Min=Minimal Assistance, Mod=Moderate Assistance, Max=Maximal Assistance, Total=Total Assistance, NT=Not Tested    PLAN:   FREQUENCY AND DURATION: 3 times/week for duration of hospital stay or until stated goals are met, whichever comes first.    TREATMENT:   TREATMENT:   Co-Treatment PT/OT necessary due to patient's decreased overall endurance/tolerance levels, as well as need for high level skilled assistance to complete functional transfers/mobility and functional tasks  Therapeutic Activity (19 Minutes): Therapeutic activity included Supine to Sit, Sit to Supine, Transfer Training, Ambulation on level ground, Sitting balance , and Standing balance to improve functional Activity tolerance, Balance, Coordination, Mobility, and Strength.     TREATMENT GRID:  N/A    AFTER TREATMENT PRECAUTIONS: Bed, Bed/Chair Locked, Call light within reach, Needs within reach, RN notified, and Side rails x3    INTERDISCIPLINARY COLLABORATION:  RN/ PCT, PT/ PTA, and OT/ WELDON    EDUCATION: Education Given To: Patient  Education Provided: Plan of Care;Transfer Training  Education Method: Verbal  Barriers to Learning: None  Education Outcome: Verbalized understanding    TIME IN/OUT:  Time In: 0926  Time Out: 0945  Minutes: Fer Stewart PT

## 2022-11-11 NOTE — PROGRESS NOTES
ACUTE OCCUPATIONAL THERAPY GOALS:   (Developed with and agreed upon by patient and/or caregiver.)  1. Patient will complete lower body bathing and dressing with MOD I and adaptive equipment as needed. 2.Patient will complete upper body bathing and dressing with MOD I and adaptive equipment as needed. 3. Patient will complete toileting with MOD I.   4. Patient will tolerate 30 minutes of OT treatment with 1-2 rest breaks to increase activity tolerance for ADLs. 5. Patient will complete functional transfers with MOD I and adaptive equipment as needed. 6. Patient will complete simple grooming task standing at the sink MOD I. Timeframe: 7 visits      OCCUPATIONAL THERAPY: Daily Note    OT Visit Days: 2   Time In/Out  OT Charge Capture  Rehab Caseload Tracker  OT Orders    Cristina Beverly is a 75 y.o. male   PRIMARY DIAGNOSIS: Acute pulmonary embolism, unspecified pulmonary embolism type, unspecified whether acute cor pulmonale present (HCC)  Shortness of breath [R06.02]  Other acute pulmonary embolism without acute cor pulmonale (HCC) [I26.99]  Acute pulmonary embolism, unspecified pulmonary embolism type, unspecified whether acute cor pulmonale present ([address] [I26.99]       Inpatient: Payor: Glen Valentine / Plan: [employer] G[employer]US HMO / Product Type: *No Product type* /     ASSESSMENT:     REHAB RECOMMENDATIONS: CURRENT LEVEL OF FUNCTION:  (Most Recently Demonstrated)   Recommendation to date pending progress:  Setting:  Home Health Therapy    Equipment:    To Be Determined Bathing:  Not Tested  Dressing:  Contact Guard Assist  Feeding/Grooming:  Contact Guard Assist  Toileting:  Not Tested  Functional Mobility:  Contact Guard Assist     ASSESSMENT:  Mr. Antonino Clark is progressing well towards OT goals. Today, pt was received supine in bed. Completed bed mobility, LB dressing, functional transfers, ambulation without AD, and grooming task standing at the sink with overall CGA.  Pt's HR much better this session--increased to 120s with activity. [facility] though pt may decline  therapy services. Mr. Jefferson Palacios continues to demonstrate overall deficits in strength, balance, activity tolerance, and ADL performance. Continue OT efforts and POC in order to address functional deficits and OT goals stated above. SUBJECTIVE:     Mr. Jefferson Palacios states, \"I am feeling much better. Just still weak. \"     Social/Functional Lives With: Spouse  Home Layout: One level  Home Access: Stairs to enter with rails  Entrance Stairs - Number of Steps: 5  Has the patient had two or more falls in the past year or any fall with injury in the past year?: Yes  Ambulation Assistance: Independent  Transfer Assistance: Independent  Active : Yes    OBJECTIVE:     Reyes Mcginins / Halley Henriquez / Ludy Rendon: DASH    RESTRICTIONS/PRECAUTIONS:           PAIN: VITALS / O2:   Pre Treatment:            Post Treatment: no change  Vitals          Oxygen        MOBILITY: I Mod I S SBA CGA Min Mod Max Total  NT x2 Comments:   Bed Mobility    Rolling [] [] [] [] [] [] [] [] [] [x] []    Supine to Sit [] [] [] [] [x] [] [] [] [] [] []    Scooting [] [] [] [] [x] [] [] [] [] [] []    Sit to Supine [] [] [] [] [x] [] [] [] [] [] []    Transfers    Sit to Stand [] [] [] [] [x] [] [] [] [] [] []    Bed to Chair [] [] [] [] [] [] [] [] [] [x] []    Stand to Sit [] [] [] [] [x] [] [] [] [] [] []    Tub/Shower [] [] [] [] [] [] [] [] [] [x] []     Toilet [] [] [] [] [] [] [] [] [] [x] []      [] [] [] [] [] [] [] [] [] [] []    I=Independent, Mod I=Modified Independent, S=Supervision/Setup, SBA=Standby Assistance, CGA=Contact Guard Assistance, Min=Minimal Assistance, Mod=Moderate Assistance, Max=Maximal Assistance, Total=Total Assistance, NT=Not Tested    ACTIVITIES OF DAILY LIVING: I Mod I S SBA CGA Min Mod Max Total NT Comments   BASIC ADLs:              Upper Body   Bathing [] [] [] [] [] [] [] [] [] [x]    Lower Body Bathing [] [] [] [] [] [] [] [] [] [x]    Toileting [] [] [] [] [] [] [] [] [] [x]    Upper Body Dressing [] [] [] [] [] [] [] [] [] [x]    Lower Body Dressing [] [] [] [] [x] [] [] [] [] [] Socks and shoes    Feeding [] [] [] [] [] [] [] [] [] [x]    Grooming [] [] [] [] [x] [] [] [] [] [] Washed hands and face standing at the sink   Personal Device Care [] [] [] [] [] [] [] [] [] [x]    Functional Mobility [] [] [] [] [x] [] [] [] [] [] No AD   I=Independent, Mod I=Modified Independent, S=Supervision/Setup, SBA=Standby Assistance, CGA=Contact Guard Assistance, Min=Minimal Assistance, Mod=Moderate Assistance, Max=Maximal Assistance, Total=Total Assistance, NT=Not Tested    BALANCE: Good Fair+ Fair Fair- Poor NT Comments   Sitting Static [x] [] [] [] [] []    Sitting Dynamic [x] [] [] [] [] []              Standing Static [x] [] [] [] [] []    Standing Dynamic [] [x] [] [] [] []        PLAN:     FREQUENCY/DURATION   OT Plan of Care: 3 times/week for duration of hospital stay or until stated goals are met, whichever comes first.    TREATMENT:     TREATMENT:   Co-Treatment PT/OT necessary due to patient's decreased overall endurance/tolerance levels, as well as need for high level skilled assistance to complete functional transfers/mobility and functional tasks  Self Care (19 minutes): Patient participated in lower body dressing and grooming ADLs in unsupported sitting and standing with no verbal cueing to increase independence, decrease assistance required, increase activity tolerance, and increase safety awareness. Patient also participated in functional mobility, functional transfer, and energy conservation training to increase independence, decrease assistance required, increase activity tolerance, and increase safety awareness.      TREATMENT GRID:  N/A    AFTER TREATMENT PRECAUTIONS: Bed, Call light within reach, Needs within reach, and RN notified    INTERDISCIPLINARY COLLABORATION:  RN/ PCT, PT/ PTA, and OT/ WELDON    EDUCATION:       TOTAL TREATMENT DURATION AND TIME:  Time In: 0926  Time Out: 0945  Minutes: Lavinia Saeed OT

## 2022-11-11 NOTE — CARE COORDINATION
11/11/22 [address] Discharge None   [employer] Information Provided? No   Mode of Transport at Discharge Other (see comment)   Confirm Follow Up Transport Family   Condition of Participation: Discharge Planning   The Patient and/or Patient Representative was provided with a Choice of Provider? Patient   The Patient and/Or Patient Representative agree with the Discharge Plan? Yes   Freedom of Choice list was provided with basic dialogue that supports the patient's individualized plan of care/goals, treatment preferences, and shares the quality data associated with the providers? Yes   Patient is discharging home. He declined HH and a walker. CM did not identify any other discharge needs.

## 2022-11-12 LAB
BACTERIA SPEC CULT: NORMAL
SERVICE CMNT-IMP: NORMAL

## 2022-11-13 LAB
BACTERIA SPEC CULT: NORMAL
SERVICE CMNT-IMP: NORMAL

## 2022-11-15 ENCOUNTER — OFFICE VISIT (OUTPATIENT)
Dept: CARDIOLOGY CLINIC | Age: 67
End: 2022-11-15
Payer: MEDICARE

## 2022-11-15 VITALS
WEIGHT: 207 LBS | DIASTOLIC BLOOD PRESSURE: 80 MMHG | HEIGHT: 67 IN | BODY MASS INDEX: 32.49 KG/M2 | SYSTOLIC BLOOD PRESSURE: 128 MMHG | HEART RATE: 94 BPM

## 2022-11-15 DIAGNOSIS — I73.9 PAD (PERIPHERAL ARTERY DISEASE) (HCC): ICD-10-CM

## 2022-11-15 DIAGNOSIS — I10 ESSENTIAL HYPERTENSION: ICD-10-CM

## 2022-11-15 DIAGNOSIS — I25.10 CORONARY ARTERY DISEASE INVOLVING NATIVE CORONARY ARTERY OF NATIVE HEART WITHOUT ANGINA PECTORIS: Primary | ICD-10-CM

## 2022-11-15 PROCEDURE — 3078F DIAST BP <80 MM HG: CPT | Performed by: INTERNAL MEDICINE

## 2022-11-15 PROCEDURE — G8417 CALC BMI ABV UP PARAM F/U: HCPCS | Performed by: INTERNAL MEDICINE

## 2022-11-15 PROCEDURE — 3017F COLORECTAL CA SCREEN DOC REV: CPT | Performed by: INTERNAL MEDICINE

## 2022-11-15 PROCEDURE — 1111F DSCHRG MED/CURRENT MED MERGE: CPT | Performed by: INTERNAL MEDICINE

## 2022-11-15 PROCEDURE — 4004F PT TOBACCO SCREEN RCVD TLK: CPT | Performed by: INTERNAL MEDICINE

## 2022-11-15 PROCEDURE — G8484 FLU IMMUNIZE NO ADMIN: HCPCS | Performed by: INTERNAL MEDICINE

## 2022-11-15 PROCEDURE — 99214 OFFICE O/P EST MOD 30 MIN: CPT | Performed by: INTERNAL MEDICINE

## 2022-11-15 PROCEDURE — G8427 DOCREV CUR MEDS BY ELIG CLIN: HCPCS | Performed by: INTERNAL MEDICINE

## 2022-11-15 PROCEDURE — 1123F ACP DISCUSS/DSCN MKR DOCD: CPT | Performed by: INTERNAL MEDICINE

## 2022-11-15 PROCEDURE — 3074F SYST BP LT 130 MM HG: CPT | Performed by: INTERNAL MEDICINE

## 2022-11-15 ASSESSMENT — ENCOUNTER SYMPTOMS
SHORTNESS OF BREATH: 0
COUGH: 0

## 2022-11-15 NOTE — PROGRESS NOTES
[address], [address] [facility], [address] [place] [address]  PHONE: [phone]    Caleb Acosta [facility]  1955      SUBJECTIVE:   Ruchi Gonzalez is a 75 y.o. male seen for a follow up visit regarding the following:     Chief Complaint   Patient presents with    Hypertension       HPI:    48-year-old male comes back for follow-up he was in the hospital with acute shortness of breath and had bilateral extensive pulmonary emboli and had thrombectomy of the by interventional radiology. He had a DVT in his leg. Echo shows mild RV enlargement at the time EF little bit lower but overall stable. He is on Eliquis now. I previously had him on low-dose Xarelto and aspirin for peripheral vascular he was taking that. He has not had any recent trauma to his leg or prolonged sedentary life. He does see hematology which I agree with. He has been feeling better since that time. Past Medical History, Past Surgical History, Family history, Social History, and Medications were all reviewed with the patient today and updated as necessary. Allergies   Allergen Reactions    Methocarbamol Hives    Ciprofloxacin Nausea And Vomiting     Past Medical History:   Diagnosis Date    Arthritis     Asthma     Calculus of kidney     Hypercholesterolemia     Hypertension      Past Surgical History:   Procedure Laterality Date    HEENT Bilateral     cataracts    [facility] THROMB [facility]  11/8/2022    IR THROMB [facility] 11/8/2022 [facility]    ORTHOPEDIC SURGERY      rotator cuff ten years ago     Family History   Problem Relation Age of Onset    Asthma Mother     Thyroid Disease Mother     COPD Mother     Heart Disease Father     Hypertension Father     Cancer Neg Hx     Deep Vein Thrombosis Neg Hx       Social History     Tobacco Use    Smoking status: Every Day     Packs/day: 1.00     Types: Cigarettes    Smokeless tobacco: Never   Substance Use Topics    Alcohol use:  Yes     Alcohol/week: 0.0 standard drinks ROS:    Review of Systems   Constitutional: Negative for decreased appetite and weight loss. Cardiovascular:  Negative for chest pain and irregular heartbeat. Respiratory:  Negative for cough and shortness of breath (better). PHYSICAL EXAM:    /80   Pulse 94   Ht 5' 7\" (1.702 m)   Wt 207 lb (93.9 kg)   BMI 32.42 kg/m²        Wt Readings from Last 3 Encounters:   11/15/22 207 lb (93.9 kg)   11/08/22 211 lb (95.7 kg)   05/09/22 222 lb (100.7 kg)     BP Readings from Last 3 Encounters:   11/15/22 128/80   11/11/22 113/77   05/09/22 126/82         Physical Exam  Constitutional:       General: He is not in acute distress. Cardiovascular:      Rate and Rhythm: Normal rate. Heart sounds:     No gallop. Pulmonary:      Effort: Pulmonary effort is normal.      Breath sounds: Normal breath sounds. Musculoskeletal:         General: No swelling. Neurological:      Mental Status: He is alert. Medical problems and test results were reviewed with the patient today. No results found for any visits on 11/15/22. Lab Results   Component Value Date/Time     11/11/2022 08:46 AM    K 4.1 11/11/2022 08:46 AM     11/11/2022 08:46 AM    CO2 26 11/11/2022 08:46 AM    BUN 15 11/11/2022 08:46 AM    GFRAA 92 06/02/2021 03:15 PM     Lab Results   Component Value Date/Time    CHOL 148 06/02/2021 03:15 PM    HDL 32 06/02/2021 03:15 PM    VLDL 33 06/02/2021 03:15 PM   I reviewed his records from [address] [place] [address] he had extensive bilateral pulmonary emboli echo showed EF of slight lower than normal but he had slight enlargement right ventricle. He is doing a lot better at this time now on Eliquis. ASSESSMENT and PLAN    Scottie Campos was seen today for hypertension.     Diagnoses and all orders for this visit:    Coronary artery disease involving native coronary artery of native heart without angina pectoris he is not have any active angina medical treatment continue    Essential hypertension his blood pressure overall seems stable currently on his metoprolol and    PAD (peripheral artery disease) ([address] not having significant symptoms he did not take the Xarelto ran out of that part of the expense  Bilateral pulmonary emboli extensive. Spontaneous. He will stay on Eliquis. I agree with seeing hematology oncology and work-up is up. He will need to stay on Eliquis for a long time. He is established with a family doctor as well  Other orders  -     apixaban (ELIQUIS) 5 MG TABS tablet; Take 10 mg every 12 hours for 7 days (last dose on 11-15-22 at 9 PM), then decrease to 5 mg every 12 hours on 11-16-22 at 9 AM        [unfilled]      No follow-up provider specified.     Kobi Castro MD  11/15/2022  12:17 PM

## 2022-11-16 ENCOUNTER — OFFICE VISIT (OUTPATIENT)
Dept: FAMILY MEDICINE CLINIC | Facility: CLINIC | Age: 67
End: 2022-11-16
Payer: MEDICARE

## 2022-11-16 VITALS
DIASTOLIC BLOOD PRESSURE: 80 MMHG | WEIGHT: 207.6 LBS | BODY MASS INDEX: 32.58 KG/M2 | OXYGEN SATURATION: 97 % | HEART RATE: 77 BPM | HEIGHT: 67 IN | SYSTOLIC BLOOD PRESSURE: 116 MMHG

## 2022-11-16 DIAGNOSIS — I82.401 LEG DVT (DEEP VENOUS THROMBOEMBOLISM), ACUTE, RIGHT (HCC): ICD-10-CM

## 2022-11-16 DIAGNOSIS — I26.99 ACUTE PULMONARY EMBOLISM, UNSPECIFIED PULMONARY EMBOLISM TYPE, UNSPECIFIED WHETHER ACUTE COR PULMONALE PRESENT (HCC): ICD-10-CM

## 2022-11-16 DIAGNOSIS — D64.9 ANEMIA, UNSPECIFIED TYPE: ICD-10-CM

## 2022-11-16 DIAGNOSIS — Z23 NEED FOR INFLUENZA VACCINATION: Primary | ICD-10-CM

## 2022-11-16 DIAGNOSIS — I65.23 ATHEROSCLEROSIS OF BOTH CAROTID ARTERIES: ICD-10-CM

## 2022-11-16 DIAGNOSIS — I25.10 CORONARY ARTERY DISEASE INVOLVING NATIVE CORONARY ARTERY OF NATIVE HEART WITHOUT ANGINA PECTORIS: ICD-10-CM

## 2022-11-16 PROCEDURE — G8484 FLU IMMUNIZE NO ADMIN: HCPCS | Performed by: FAMILY MEDICINE

## 2022-11-16 PROCEDURE — 99204 OFFICE O/P NEW MOD 45 MIN: CPT | Performed by: FAMILY MEDICINE

## 2022-11-16 PROCEDURE — 3078F DIAST BP <80 MM HG: CPT | Performed by: FAMILY MEDICINE

## 2022-11-16 PROCEDURE — 3017F COLORECTAL CA SCREEN DOC REV: CPT | Performed by: FAMILY MEDICINE

## 2022-11-16 PROCEDURE — 90694 VACC AIIV4 NO PRSRV 0.5ML IM: CPT | Performed by: FAMILY MEDICINE

## 2022-11-16 PROCEDURE — G8417 CALC BMI ABV UP PARAM F/U: HCPCS | Performed by: FAMILY MEDICINE

## 2022-11-16 PROCEDURE — G0008 ADMIN INFLUENZA VIRUS VAC: HCPCS | Performed by: FAMILY MEDICINE

## 2022-11-16 PROCEDURE — G8427 DOCREV CUR MEDS BY ELIG CLIN: HCPCS | Performed by: FAMILY MEDICINE

## 2022-11-16 PROCEDURE — 1036F TOBACCO NON-USER: CPT | Performed by: FAMILY MEDICINE

## 2022-11-16 PROCEDURE — 1111F DSCHRG MED/CURRENT MED MERGE: CPT | Performed by: FAMILY MEDICINE

## 2022-11-16 PROCEDURE — 3074F SYST BP LT 130 MM HG: CPT | Performed by: FAMILY MEDICINE

## 2022-11-16 PROCEDURE — 1123F ACP DISCUSS/DSCN MKR DOCD: CPT | Performed by: FAMILY MEDICINE

## 2022-11-16 SDOH — ECONOMIC STABILITY: FOOD INSECURITY: WITHIN THE PAST 12 MONTHS, YOU WORRIED THAT YOUR FOOD WOULD RUN OUT BEFORE YOU GOT MONEY TO BUY MORE.: NEVER TRUE

## 2022-11-16 SDOH — ECONOMIC STABILITY: FOOD INSECURITY: WITHIN THE PAST 12 MONTHS, THE FOOD YOU BOUGHT JUST DIDN'T LAST AND YOU DIDN'T HAVE MONEY TO GET MORE.: NEVER TRUE

## 2022-11-16 ASSESSMENT — PATIENT HEALTH QUESTIONNAIRE - PHQ9
SUM OF ALL RESPONSES TO PHQ QUESTIONS 1-9: 0
SUM OF ALL RESPONSES TO PHQ QUESTIONS 1-9: 0
2. FEELING DOWN, DEPRESSED OR HOPELESS: 0
SUM OF ALL RESPONSES TO PHQ9 QUESTIONS 1 & 2: 0
SUM OF ALL RESPONSES TO PHQ QUESTIONS 1-9: 0
SUM OF ALL RESPONSES TO PHQ QUESTIONS 1-9: 0
1. LITTLE INTEREST OR PLEASURE IN DOING THINGS: 0

## 2022-11-16 ASSESSMENT — ENCOUNTER SYMPTOMS: GASTROINTESTINAL NEGATIVE: 1

## 2022-11-16 ASSESSMENT — SOCIAL DETERMINANTS OF HEALTH (SDOH): HOW HARD IS IT FOR YOU TO PAY FOR THE VERY BASICS LIKE FOOD, HOUSING, MEDICAL CARE, AND HEATING?: NOT HARD AT ALL

## 2022-11-16 NOTE — PROGRESS NOTES
PROGRESS NOTE    SUBJECTIVE:   Nicolás Barbosa is a 75 y.o. male seen for a follow up visit regarding   Chief Complaint   Patient presents with    New Patient     Establish care    Follow-Up from Hospital     Was seen in hospital 11/7/22-11/11-22 for pulmonary embolism. Seen cardiology yesterday. HPI:  Patient is here to establish care with us. Recently discharged from the hospital after being admitted in November 7 and found to have bilateral pulmonary emboli with apparent right leg source. He had pulmonary artery thrombectomy in November 8. His hospital records are reviewed, discharge summary reviewed, echo reviewed. He saw cardiology yesterday. He states that he is breathing much better. He is taking his Eliquis compliantly. He has a follow-up with hematology December 9. He has been taking injectable testosterone prior to this, he is advised to stop this. Also encouraged to quit smoking. Echo showed mild decline in ejection fraction, global hypokinesis on echo. Associated diastolic dysfunction. No prior history of heart failure. CT scan also showed some significant aortic and coronary artery calcifications. CT also suggested emphysema. Patient has been prescribed an inhaler, Spiriva, but admits he is not taking this regularly, stating he does not want to get dependent on this. Patient's prehospital records reviewed, history of carotid atherosclerosis. Reviewed and updated this visit by provider:  Tobacco  Allergies  Meds  Problems  Med Hx  Surg Hx  Fam Hx           Review of Systems   HENT: Negative. Respiratory:          Shortness of breath much improved, still with some dyspnea on exertion. Cardiovascular:  Positive for chest pain and palpitations. Gastrointestinal: Negative.          OBJECTIVE:  Vitals:    11/16/22 1020   BP: 116/80   Site: Left Upper Arm   Cuff Size: Medium Adult   Pulse: 77   SpO2: 97%   Weight: 207 lb 9.6 oz (94.2 kg)   Height: 5' 7\" (1.702 m) Physical Exam   General: Alert and oriented x3, well-appearing presently, no distress  HEENT: Normocephalic; external ears, ear canals, and  Tympanic membranes normal; PERRLA, EOMI, normal conjunctiva; normal nasal mucosa without drainage; oropharynx is moist without mucosal lesion  Neck: No adenopathy, thyromegaly or thyroid nodules  Pulmonary: Normal effort, good airflow, no rales or rhonchi  CVS: Regular rate and rhythm, normal S1, S2, no S3 or S4, no murmurs; no apparent carotid bruits, 1+ pedal pulses  Abdomen: Nondistended, normal bowel sounds, nontender without mass organomegaly  Extremities: No cyanosis/clubbing/edema  Groin: Left proximal medial thigh with ecchymoses, the procedure site is dry and clean      Medical problems and test results were reviewed with the patient today.      Recent Results (from the past 672 hour(s))   CBC with Auto Differential    Collection Time: 10/31/22  8:54 AM   Result Value Ref Range    WBC 9.6 4.3 - 11.1 K/uL    RBC 4.65 4.23 - 5.6 M/uL    Hemoglobin 14.9 13.6 - 17.2 g/dL    Hematocrit 43.8 41.1 - 50.3 %    MCV 94.2 82 - 102 FL    MCH 32.0 26.1 - 32.9 PG    MCHC 34.0 31.4 - 35.0 g/dL    RDW 14.1 11.9 - 14.6 %    Platelets 587 362 - 922 K/uL    MPV 10.0 9.4 - 12.3 FL    nRBC 0.00 0.0 - 0.2 K/uL    Differential Type AUTOMATED      Seg Neutrophils 68 43 - 78 %    Lymphocytes 23 13 - 44 %    Monocytes 7 4.0 - 12.0 %    Eosinophils % 1 0.5 - 7.8 %    Basophils 0 0.0 - 2.0 %    Immature Granulocytes 1 0.0 - 5.0 %    Segs Absolute 6.5 1.7 - 8.2 K/UL    Absolute Lymph # 2.2 0.5 - 4.6 K/UL    Absolute Mono # 0.7 0.1 - 1.3 K/UL    Absolute Eos # 0.1 0.0 - 0.8 K/UL    Basophils Absolute 0.0 0.0 - 0.2 K/UL    Absolute Immature Granulocyte 0.1 0.0 - 0.5 K/UL   EKG 12 Lead    Collection Time: 11/07/22  1:58 PM   Result Value Ref Range    Ventricular Rate 127 BPM    Atrial Rate 127 BPM    P-R Interval 146 ms    QRS Duration 78 ms    Q-T Interval 294 ms    QTc Calculation (Bazett) 427 ms    P Axis 73 degrees    R Axis 65 degrees    T Axis 21 degrees    Diagnosis       Sinus tachycardia with occasional Premature ventricular complexes   Culture, Blood 1    Collection Time: 11/07/22  2:19 PM    Specimen: Blood   Result Value Ref Range    Special Requests LEFT  FOREARM        Culture NO GROWTH 5 DAYS     Lactate, Sepsis    Collection Time: 11/07/22  2:19 PM   Result Value Ref Range    Lactic Acid, Sepsis 2.1 (H) 0.4 - 2.0 MMOL/L   CBC with Auto Differential    Collection Time: 11/07/22  2:19 PM   Result Value Ref Range    WBC 12.2 (H) 4.3 - 11.1 K/uL    RBC 5.10 4.23 - 5.6 M/uL    Hemoglobin 16.2 13.6 - 17.2 g/dL    Hematocrit 48.0 41.1 - 50.3 %    MCV 94.1 82 - 102 FL    MCH 31.8 26.1 - 32.9 PG    MCHC 33.8 31.4 - 35.0 g/dL    RDW 13.8 11.9 - 14.6 %    Platelets 077 044 - 922 K/uL    MPV 9.7 9.4 - 12.3 FL    nRBC 0.00 0.0 - 0.2 K/uL    Differential Type AUTOMATED      Seg Neutrophils 78 43 - 78 %    Lymphocytes 15 13 - 44 %    Monocytes 6 4.0 - 12.0 %    Eosinophils % 0 (L) 0.5 - 7.8 %    Basophils 0 0.0 - 2.0 %    Immature Granulocytes 1 0.0 - 5.0 %    Segs Absolute 9.5 (H) 1.7 - 8.2 K/UL    Absolute Lymph # 1.9 0.5 - 4.6 K/UL    Absolute Mono # 0.7 0.1 - 1.3 K/UL    Absolute Eos # 0.0 0.0 - 0.8 K/UL    Basophils Absolute 0.0 0.0 - 0.2 K/UL    Absolute Immature Granulocyte 0.1 0.0 - 0.5 K/UL   CMP    Collection Time: 11/07/22  2:19 PM   Result Value Ref Range    Sodium 136 133 - 143 mmol/L    Potassium 4.9 3.5 - 5.1 mmol/L    Chloride 108 101 - 110 mmol/L    CO2 23 21 - 32 mmol/L    Anion Gap 5 2 - 11 mmol/L    Glucose 131 (H) 65 - 100 mg/dL    BUN 29 (H) 8 - 23 MG/DL    Creatinine 1.30 0.8 - 1.5 MG/DL    Est, Glom Filt Rate 60 (L) >60 ml/min/1.73m2    Calcium 10.3 8.3 - 10.4 MG/DL    Total Bilirubin 0.8 0.2 - 1.1 MG/DL    ALT 35 12 - 65 U/L    AST 48 (H) 15 - 37 U/L    Alk Phosphatase 73 50 - 136 U/L    Total Protein 7.6 6.3 - 8.2 g/dL    Albumin 3.3 3.2 - 4.6 g/dL    Globulin 4.3 2.8 - 4.5 g/dL Albumin/Globulin Ratio 0.8 0.4 - 1.6     Procalcitonin    Collection Time: 11/07/22  2:19 PM   Result Value Ref Range    Procalcitonin <0.05 0.00 - 0.49 ng/mL   Respiratory Panel, Molecular, with COVID-19 (Restricted: peds pts or suitable admitted adults)    Collection Time: 11/07/22  2:19 PM    Specimen: Nasopharyngeal   Result Value Ref Range    Adenovirus by PCR NOT DETECTED NOTDET      Coronavirus 229E by PCR NOT DETECTED NOTDET      Coronavirus HKU1 by PCR NOT DETECTED NOTDET      Coronavirus NL63 by PCR NOT DETECTED NOTDET      Coronavirus OC43 by PCR NOT DETECTED NOTDET      SARS-CoV-2, PCR NOT DETECTED NOTDET      Human Metapneumovirus by PCR NOT DETECTED NOTDET      Rhinovirus Enterovirus PCR NOT DETECTED NOTDET      Influenza A by PCR NOT DETECTED NOTDET      Influenza B PCR NOT DETECTED NOTDET      Parainfluenza 1 PCR NOT DETECTED NOTDET      Parainfluenza 2 PCR NOT DETECTED NOTDET      Parainfluenza 3 PCR NOT DETECTED NOTDET      Parainfluenza 4 PCR NOT DETECTED NOTDET      Respiratory Syncytial Virus by PCR NOT DETECTED NOTDET      Bordetella parapertussis by PCR NOT DETECTED NOTDET      Bordetella pertussis by PCR NOT DETECTED NOTDET      Chlamydophila Pneumonia PCR NOT DETECTED NOTDET      Mycoplasma pneumo by PCR NOT DETECTED NOTDET     POCT Urinalysis no Micro    Collection Time: 11/07/22  4:29 PM   Result Value Ref Range    Specific Gravity, Urine, POC >1.030 (H) 1.001 - 1.023    pH, Urine, POC 5.0 5.0 - 9.0      Protein, Urine,  (A) NEG mg/dL    Glucose, UA POC Negative NEG mg/dL    Ketones, Urine, POC TRACE (A) NEG mg/dL    Bilirubin, Urine, POC MODERATE (A) NEG      Blood, UA POC Negative NEG      URINE UROBILINOGEN POC 0.2 0.2 - 1.0 EU/dL    Nitrate, Urine, POC Negative NEG      Leukocyte Est, UA POC Negative NEG      Performed by: Eligio Ivy    Lactate, Sepsis    Collection Time: 11/07/22  6:33 PM   Result Value Ref Range    Lactic Acid, Sepsis 2.2 (H) 0.4 - 2.0 MMOL/L   APTT Collection Time: 11/07/22  6:33 PM   Result Value Ref Range    .5 (HH) 24.5 - 34.2 SEC   Protime-INR    Collection Time: 11/07/22  6:33 PM   Result Value Ref Range    Protime 15.6 (H) 12.6 - 14.3 sec    INR 1.2     Brain Natriuretic Peptide    Collection Time: 11/07/22  9:36 PM   Result Value Ref Range    NT Pro-BNP 4,319 (H) 5 - 125 PG/ML   Troponin    Collection Time: 11/07/22  9:36 PM   Result Value Ref Range    Troponin, High Sensitivity 1,087.4 (HH) 0 - 14 pg/mL   POCT Glucose    Collection Time: 11/07/22 10:25 PM   Result Value Ref Range    POC Glucose 106 (H) 65 - 100 mg/dL    Performed by: Yassine    Culture, Blood 1    Collection Time: 11/07/22 11:46 PM    Specimen: Blood   Result Value Ref Range    Special Requests LEFT  FOREARM        Culture NO GROWTH 5 DAYS     Anti-Xa, Unfractionated Heparin    Collection Time: 11/07/22 11:46 PM   Result Value Ref Range    Anti-XA Unfrac Heparin >1.1 (H) 0.3 - 0.7 IU/mL   CBC with Auto Differential    Collection Time: 11/08/22  3:21 AM   Result Value Ref Range    WBC 11.5 (H) 4.3 - 11.1 K/uL    RBC 4.36 4.23 - 5.6 M/uL    Hemoglobin 13.9 13.6 - 17.2 g/dL    Hematocrit 41.3 41.1 - 50.3 %    MCV 94.7 82 - 102 FL    MCH 31.9 26.1 - 32.9 PG    MCHC 33.7 31.4 - 35.0 g/dL    RDW 13.9 11.9 - 14.6 %    Platelets 361 (L) 191 - 450 K/uL    MPV 10.8 9.4 - 12.3 FL    nRBC 0.00 0.0 - 0.2 K/uL    Differential Type AUTOMATED      Seg Neutrophils 77 43 - 78 %    Lymphocytes 16 13 - 44 %    Monocytes 6 4.0 - 12.0 %    Eosinophils % 0 (L) 0.5 - 7.8 %    Basophils 0 0.0 - 2.0 %    Immature Granulocytes 1 0.0 - 5.0 %    Segs Absolute 8.8 (H) 1.7 - 8.2 K/UL    Absolute Lymph # 1.8 0.5 - 4.6 K/UL    Absolute Mono # 0.7 0.1 - 1.3 K/UL    Absolute Eos # 0.0 0.0 - 0.8 K/UL    Basophils Absolute 0.0 0.0 - 0.2 K/UL    Absolute Immature Granulocyte 0.1 0.0 - 0.5 K/UL   Magnesium    Collection Time: 11/08/22  3:21 AM   Result Value Ref Range    Magnesium 2.0 1.8 - 2.4 mg/dL Comprehensive Metabolic Panel    Collection Time: 11/08/22  3:21 AM   Result Value Ref Range    Sodium 139 133 - 143 mmol/L    Potassium 3.8 3.5 - 5.1 mmol/L    Chloride 110 101 - 110 mmol/L    CO2 24 21 - 32 mmol/L    Anion Gap 5 2 - 11 mmol/L    Glucose 105 (H) 65 - 100 mg/dL    BUN 24 (H) 8 - 23 MG/DL    Creatinine 1.00 0.8 - 1.5 MG/DL    Est, Glom Filt Rate >60 >60 ml/min/1.73m2    Calcium 8.9 8.3 - 10.4 MG/DL    Total Bilirubin 0.6 0.2 - 1.1 MG/DL    ALT 27 12 - 65 U/L    AST 24 15 - 37 U/L    Alk Phosphatase 62 50 - 136 U/L    Total Protein 6.1 (L) 6.3 - 8.2 g/dL    Albumin 2.7 (L) 3.2 - 4.6 g/dL    Globulin 3.4 2.8 - 4.5 g/dL    Albumin/Globulin Ratio 0.8 0.4 - 1.6     Hemoglobin A1c    Collection Time: 11/08/22  3:21 AM   Result Value Ref Range    Hemoglobin A1C 6.1 (H) 4.8 - 5.6 %    eAG 128 mg/dL   Anti-Xa, Unfractionated Heparin    Collection Time: 11/08/22  8:20 AM   Result Value Ref Range    Anti-XA Unfrac Heparin 0.96 (H) 0.3 - 0.7 IU/mL   POCT Glucose    Collection Time: 11/08/22 12:04 PM   Result Value Ref Range    POC Glucose 104 (H) 65 - 100 mg/dL    Performed by: Mani    POCT Glucose    Collection Time: 11/08/22  4:16 PM   Result Value Ref Range    POC Glucose 99 65 - 100 mg/dL    Performed by: Rashad    Transthoracic echocardiogram (TTE) complete with contrast, bubble, strain, and 3D PRN    Collection Time: 11/08/22  4:26 PM   Result Value Ref Range    LV EDV A2C 86 mL    LV EDV A4C 113 mL    LV ESV A2C 50 mL    LV ESV A4C 64 mL    IVSd 1.0 0.6 - 1.0 cm    LVIDd 4.2 4.2 - 5.9 cm    LVIDs 3.3 cm    LVOT Diameter 2.0 cm    LVOT Mean Gradient 2 mmHg    LVOT VTI 9.6 cm    LVOT Peak Velocity 0.9 m/s    LVOT Peak Gradient 3 mmHg    LVPWd 1.0 0.6 - 1.0 cm    LV E' Lateral Velocity 7 cm/s    LV E' Septal Velocity 9 cm/s    LV Ejection Fraction A2C 42 %    LV Ejection Fraction A4C 43 %    EF BP 42 (A) 55 - 100 %    LVOT Area 3.1 cm2    LVOT SV 30.1 ml    LA Minor Axis 4.6 cm LA Major [place] 4.9 cm    LA Area 2C 10.7 cm2    LA Area 4C 12.5 cm2    LA Volume 2C 20 18 - 58 mL    LA Volume 4C 26 18 - 58 mL    LA Volume BP 24 18 - 58 mL    LA Diameter 2.8 cm    AV Mean Velocity 0.9 m/s    AV Mean Gradient 4 mmHg    AV VTI 17.2 cm    AV Peak Velocity 1.3 m/s    AV Peak Gradient 7 mmHg    AV Area by VTI 1.8 cm2    AV Area by Peak Velocity 2.2 cm2    Aortic Root 3.1 cm    Ascending Aorta 3.3 cm    MV E Wave Deceleration Time 248.0 ms    MV A Velocity 1.20 m/s    MV E Velocity 0.58 m/s    MV Mean Gradient 2 mmHg    MV VTI 18.2 cm    MV Mean Velocity 0.7 m/s    MV Max Velocity 1.4 m/s    MV Peak Gradient 8 mmHg    MV Area by VTI 1.7 cm2    PV .0 ms    PV Max Velocity 0.7 m/s    PV Peak Gradient 2 mmHg    RVIDd 3.5 cm    RV Basal Dimension 3.9 cm    RV Free Wall Peak S' 10 cm/s    TAPSE 1.3 (A) 1.7 cm    Body Surface Area 2.13 m2    Fractional Shortening 2D 21 28 - 44 %    LV ESV Index A4C 31 mL/m2    LV EDV Index A4C 55 mL/m2    LV ESV Index A2C 24 mL/m2    LV EDV Index A2C 42 mL/m2    LVIDd Index 2.03 cm/m2    LVIDs Index 1.59 cm/m2    LV RWT Ratio 0.48     LV Mass 2D 137.2 88 - 224 g    LV Mass 2D Index 66.3 49 - 115 g/m2    MV E/A 0.48     E/E' Ratio (Averaged) 7.37     E/E' Lateral 8.29     E/E' Septal 6.44     LA Volume Index BP 12 (A) 16 - 34 ml/m2    LVOT Stroke Volume Index 14.6 mL/m2    LA Volume Index 2C 10 (A) 16 - 34 mL/m2    LA Volume Index 4C 13 (A) 16 - 34 mL/m2    LA Size Index 1.35 cm/m2    LA/AO Root Ratio 0.90     Ao Root Index 1.50 cm/m2    Ascending Aorta Index 1.59 cm/m2    AV Velocity Ratio 0.69     LVOT:AV VTI Index 0.56     LIZZY/BSA VTI 0.9 cm2/m2    LIZZY/BSA Peak Velocity 1.1 cm2/m2    MV:LVOT VTI Index 1.90    Anti-Xa, Unfractionated Heparin    Collection Time: 11/08/22  6:14 PM   Result Value Ref Range    Anti-XA Unfrac Heparin >1.1 (H) 0.3 - 0.7 IU/mL   POCT Glucose    Collection Time: 11/08/22  8:32 PM   Result Value Ref Range    POC Glucose 152 (H) 65 - 100 mg/dL Performed by: Yassine    CBC with Auto Differential    Collection Time: 11/09/22  4:32 AM   Result Value Ref Range    WBC 12.5 (H) 4.3 - 11.1 K/uL    RBC 3.95 (L) 4.23 - 5.6 M/uL    Hemoglobin 12.2 (L) 13.6 - 17.2 g/dL    Hematocrit 38.1 (L) 41.1 - 50.3 %    MCV 96.5 82 - 102 FL    MCH 30.9 26.1 - 32.9 PG    MCHC 32.0 31.4 - 35.0 g/dL    RDW 13.9 11.9 - 14.6 %    Platelets 687 (L) 766 - 450 K/uL    MPV 10.6 9.4 - 12.3 FL    nRBC 0.00 0.0 - 0.2 K/uL    Differential Type AUTOMATED      Seg Neutrophils 77 43 - 78 %    Lymphocytes 15 13 - 44 %    Monocytes 6 4.0 - 12.0 %    Eosinophils % 1 0.5 - 7.8 %    Basophils 0 0.0 - 2.0 %    Immature Granulocytes 1 0.0 - 5.0 %    Segs Absolute 9.6 (H) 1.7 - 8.2 K/UL    Absolute Lymph # 1.9 0.5 - 4.6 K/UL    Absolute Mono # 0.7 0.1 - 1.3 K/UL    Absolute Eos # 0.1 0.0 - 0.8 K/UL    Basophils Absolute 0.0 0.0 - 0.2 K/UL    Absolute Immature Granulocyte 0.2 0.0 - 0.5 K/UL   Basic Metabolic Panel w/ Reflex to MG    Collection Time: 11/09/22  4:32 AM   Result Value Ref Range    Sodium 141 133 - 143 mmol/L    Potassium 3.9 3.5 - 5.1 mmol/L    Chloride 111 (H) 101 - 110 mmol/L    CO2 24 21 - 32 mmol/L    Anion Gap 6 2 - 11 mmol/L    Glucose 90 65 - 100 mg/dL    BUN 16 8 - 23 MG/DL    Creatinine 0.90 0.8 - 1.5 MG/DL    Est, Glom Filt Rate >60 >60 ml/min/1.73m2    Calcium 8.6 8.3 - 10.4 MG/DL   Anti-Xa, Unfractionated Heparin    Collection Time: 11/09/22  4:32 AM   Result Value Ref Range    Anti-XA Unfrac Heparin 0.40 0.3 - 0.7 IU/mL   POCT Glucose    Collection Time: 11/09/22  8:20 AM   Result Value Ref Range    POC Glucose 96 65 - 100 mg/dL    Performed by: River    POCT Glucose    Collection Time: 11/09/22 11:37 AM   Result Value Ref Range    POC Glucose 154 (H) 65 - 100 mg/dL    Performed by:  Guerda    EKG 12 Lead    Collection Time: 11/09/22  1:53 PM   Result Value Ref Range    Ventricular Rate 108 BPM    Atrial Rate 108 BPM    P-R Interval 152 ms    QRS Duration 92 ms    Q-T Interval 340 ms    QTc Calculation (Bazett) 455 ms    P Axis 74 degrees    R Axis 70 degrees    T Axis 51 degrees    Diagnosis Sinus tachycardia    POCT Glucose    Collection Time: 11/09/22  4:40 PM   Result Value Ref Range    POC Glucose 119 (H) 65 - 100 mg/dL    Performed by: [employer]    POCT Glucose    Collection Time: 11/09/22  8:46 PM   Result Value Ref Range    POC Glucose 122 (H) 65 - 100 mg/dL    Performed by: Hector    CBC with Auto Differential    Collection Time: 11/10/22  3:14 AM   Result Value Ref Range    WBC 12.1 (H) 4.3 - 11.1 K/uL    RBC 3.70 (L) 4.23 - 5.6 M/uL    Hemoglobin 11.7 (L) 13.6 - 17.2 g/dL    Hematocrit 35.5 (L) 41.1 - 50.3 %    MCV 95.9 82 - 102 FL    MCH 31.6 26.1 - 32.9 PG    MCHC 33.0 31.4 - 35.0 g/dL    RDW 13.8 11.9 - 14.6 %    Platelets 423 (L) 391 - 450 K/uL    MPV 10.8 9.4 - 12.3 FL    nRBC 0.00 0.0 - 0.2 K/uL    Differential Type AUTOMATED      Seg Neutrophils 79 (H) 43 - 78 %    Lymphocytes 13 13 - 44 %    Monocytes 7 4.0 - 12.0 %    Eosinophils % 0 (L) 0.5 - 7.8 %    Basophils 0 0.0 - 2.0 %    Immature Granulocytes 1 0.0 - 5.0 %    Segs Absolute 9.4 (H) 1.7 - 8.2 K/UL    Absolute Lymph # 1.6 0.5 - 4.6 K/UL    Absolute Mono # 0.8 0.1 - 1.3 K/UL    Absolute Eos # 0.1 0.0 - 0.8 K/UL    Basophils Absolute 0.0 0.0 - 0.2 K/UL    Absolute Immature Granulocyte 0.1 0.0 - 0.5 K/UL   POCT Glucose    Collection Time: 11/10/22  8:59 AM   Result Value Ref Range    POC Glucose 136 (H) 65 - 100 mg/dL    Performed by: [employer]    POCT Glucose    Collection Time: 11/10/22 11:35 AM   Result Value Ref Range    POC Glucose 111 (H) 65 - 100 mg/dL    Performed by: [facility]    POCT Glucose    Collection Time: 11/10/22  4:30 PM   Result Value Ref Range    POC Glucose 113 (H) 65 - 100 mg/dL    Performed by:  [employer]    POCT Glucose    Collection Time: 11/10/22  8:50 PM   Result Value Ref Range    POC Glucose 110 (H) 65 - 100 mg/dL    Performed by: Mila    POCT Glucose    Collection Time: 11/11/22  7:53 AM   Result Value Ref Range    POC Glucose 97 65 - 100 mg/dL    Performed by: Robert    Basic Metabolic Panel    Collection Time: 11/11/22  8:46 AM   Result Value Ref Range    Sodium 138 133 - 143 mmol/L    Potassium 4.1 3.5 - 5.1 mmol/L    Chloride 106 101 - 110 mmol/L    CO2 26 21 - 32 mmol/L    Anion Gap 6 2 - 11 mmol/L    Glucose 114 (H) 65 - 100 mg/dL    BUN 15 8 - 23 MG/DL    Creatinine 1.00 0.8 - 1.5 MG/DL    Est, Glom Filt Rate >60 >60 ml/min/1.73m2    Calcium 9.8 8.3 - 10.4 MG/DL   CBC with Auto Differential    Collection Time: 11/11/22  8:46 AM   Result Value Ref Range    WBC 10.9 4.3 - 11.1 K/uL    RBC 4.21 (L) 4.23 - 5.6 M/uL    Hemoglobin 13.1 (L) 13.6 - 17.2 g/dL    Hematocrit 40.1 (L) 41.1 - 50.3 %    MCV 95.2 82 - 102 FL    MCH 31.1 26.1 - 32.9 PG    MCHC 32.7 31.4 - 35.0 g/dL    RDW 13.9 11.9 - 14.6 %    Platelets 230 592 - 053 K/uL    MPV 9.8 9.4 - 12.3 FL    nRBC 0.00 0.0 - 0.2 K/uL    Differential Type AUTOMATED      Seg Neutrophils 77 43 - 78 %    Lymphocytes 15 13 - 44 %    Monocytes 5 4.0 - 12.0 %    Eosinophils % 1 0.5 - 7.8 %    Basophils 1 0.0 - 2.0 %    Immature Granulocytes 1 0.0 - 5.0 %    Segs Absolute 8.5 (H) 1.7 - 8.2 K/UL    Absolute Lymph # 1.6 0.5 - 4.6 K/UL    Absolute Mono # 0.6 0.1 - 1.3 K/UL    Absolute Eos # 0.1 0.0 - 0.8 K/UL    Basophils Absolute 0.1 0.0 - 0.2 K/UL    Absolute Immature Granulocyte 0.1 0.0 - 0.5 K/UL   POCT Glucose    Collection Time: 11/11/22 11:28 AM   Result Value Ref Range    POC Glucose 111 (H) 65 - 100 mg/dL    Performed by: Robert        No results found for any visits on 11/16/22. ASSESSMENT and PLAN    1. Need for influenza vaccination  -     Influenza, FLUAD, (age 72 y+), IM, PF, 0.5 mL  2. Acute pulmonary embolism, unspecified pulmonary embolism type, unspecified whether acute cor pulmonale present (HCC)  3. Atherosclerosis of both carotid arteries  4.  Leg DVT (deep venous thromboembolism), acute, right ([address]  5. Anemia, unspecified type  6. Coronary artery disease involving native coronary artery of native heart without angina pectoris     Patient encouraged to quit smoking. Patient will follow up with hematology December 9. Encouraged compliance with anticoagulant  See back here in 2 months. Return in about 2 months (around 1/16/2023).        Belle Jane MD

## 2022-11-28 ENCOUNTER — NURSE ONLY (OUTPATIENT)
Dept: UROLOGY | Age: 67
End: 2022-11-28
Payer: MEDICARE

## 2022-11-28 DIAGNOSIS — E29.1 HYPOGONADISM IN MALE: Primary | ICD-10-CM

## 2022-11-28 PROCEDURE — 96372 THER/PROPH/DIAG INJ SC/IM: CPT | Performed by: UROLOGY

## 2022-11-28 RX ORDER — TESTOSTERONE CYPIONATE 200 MG/ML
100 INJECTION INTRAMUSCULAR ONCE
Status: COMPLETED | OUTPATIENT
Start: 2022-11-28 | End: 2022-11-28

## 2022-11-28 RX ADMIN — TESTOSTERONE CYPIONATE 100 MG: 200 INJECTION INTRAMUSCULAR at 09:54

## 2022-11-28 NOTE — PROGRESS NOTES
Per 's lab result note from 10/31/2022, patient due to receive 100mg testosterone injection every 3 weeks. Patient received injection IM LUOQ today without incident.    Testosterone injections authorized by insurance in schedule note from Jesus Metz stating: NO PA REQ-ALLYNG/Testosterone Inj.

## 2022-12-09 ENCOUNTER — TELEPHONE (OUTPATIENT)
Dept: ONCOLOGY | Age: 67
End: 2022-12-09

## 2022-12-14 ENCOUNTER — TELEPHONE (OUTPATIENT)
Dept: ONCOLOGY | Age: 67
End: 2022-12-14

## 2022-12-16 ENCOUNTER — TELEPHONE (OUTPATIENT)
Dept: CARDIOLOGY CLINIC | Age: 67
End: 2022-12-16

## 2022-12-16 NOTE — TELEPHONE ENCOUNTER
[facility] called stating they have two prescriptions for the eliquis, one received on Nov 9 and then the other Nov 15. They are wondering if he needs to be taking it every 12 hours or what it says on the taper.

## 2023-01-09 ENCOUNTER — TELEPHONE (OUTPATIENT)
Dept: ONCOLOGY | Age: 68
End: 2023-01-09

## 2023-01-09 NOTE — TELEPHONE ENCOUNTER
Pt ambulatory c steady gait, AAOx4, ABC's intact, NAD noted at this time. Pt discharged c belongings and educational packet. PPE worn by this RN c pt wearing mask during encounters.      Ori Pratt, EFREM  12/04/20 9738     Pt request a call back to scheduled an Appt.

## 2023-01-11 ENCOUNTER — NURSE ONLY (OUTPATIENT)
Dept: UROLOGY | Age: 68
End: 2023-01-11

## 2023-01-11 DIAGNOSIS — E29.1 HYPOGONADISM IN MALE: Primary | ICD-10-CM

## 2023-01-11 RX ORDER — TESTOSTERONE CYPIONATE 200 MG/ML
200 INJECTION INTRAMUSCULAR ONCE
Status: COMPLETED | OUTPATIENT
Start: 2023-01-11 | End: 2023-01-11

## 2023-01-11 RX ADMIN — TESTOSTERONE CYPIONATE 100 MG: 200 INJECTION INTRAMUSCULAR at 16:44

## 2023-01-11 NOTE — PROGRESS NOTES
Per 's lab result note from 10/31/2022, patient due to receive 100mg testosterone injection every 3 weeks. Patient received injection IM RUOQ today without incident.    Testosterone injections authorized by insurance in schedule note from [place] stating: DESIREE ESCOBEDO

## 2023-01-16 ENCOUNTER — OFFICE VISIT (OUTPATIENT)
Dept: FAMILY MEDICINE CLINIC | Facility: CLINIC | Age: 68
End: 2023-01-16
Payer: MEDICARE

## 2023-01-16 VITALS
HEART RATE: 102 BPM | DIASTOLIC BLOOD PRESSURE: 88 MMHG | WEIGHT: 217.8 LBS | SYSTOLIC BLOOD PRESSURE: 160 MMHG | BODY MASS INDEX: 34.18 KG/M2 | OXYGEN SATURATION: 96 % | HEIGHT: 67 IN

## 2023-01-16 DIAGNOSIS — I10 ESSENTIAL (PRIMARY) HYPERTENSION: Primary | ICD-10-CM

## 2023-01-16 DIAGNOSIS — I26.99 ACUTE PULMONARY EMBOLISM, UNSPECIFIED PULMONARY EMBOLISM TYPE, UNSPECIFIED WHETHER ACUTE COR PULMONALE PRESENT (HCC): ICD-10-CM

## 2023-01-16 DIAGNOSIS — R79.89 LOW SERUM PARATHYROID HORMONE (PTH): ICD-10-CM

## 2023-01-16 DIAGNOSIS — I25.10 CORONARY ARTERY DISEASE INVOLVING NATIVE CORONARY ARTERY OF NATIVE HEART WITHOUT ANGINA PECTORIS: ICD-10-CM

## 2023-01-16 DIAGNOSIS — I10 ESSENTIAL HYPERTENSION: ICD-10-CM

## 2023-01-16 PROCEDURE — 3077F SYST BP >= 140 MM HG: CPT | Performed by: FAMILY MEDICINE

## 2023-01-16 PROCEDURE — 99214 OFFICE O/P EST MOD 30 MIN: CPT | Performed by: FAMILY MEDICINE

## 2023-01-16 PROCEDURE — 1123F ACP DISCUSS/DSCN MKR DOCD: CPT | Performed by: FAMILY MEDICINE

## 2023-01-16 PROCEDURE — 1036F TOBACCO NON-USER: CPT | Performed by: FAMILY MEDICINE

## 2023-01-16 PROCEDURE — 3079F DIAST BP 80-89 MM HG: CPT | Performed by: FAMILY MEDICINE

## 2023-01-16 PROCEDURE — G8427 DOCREV CUR MEDS BY ELIG CLIN: HCPCS | Performed by: FAMILY MEDICINE

## 2023-01-16 PROCEDURE — 3017F COLORECTAL CA SCREEN DOC REV: CPT | Performed by: FAMILY MEDICINE

## 2023-01-16 PROCEDURE — G8417 CALC BMI ABV UP PARAM F/U: HCPCS | Performed by: FAMILY MEDICINE

## 2023-01-16 PROCEDURE — G8484 FLU IMMUNIZE NO ADMIN: HCPCS | Performed by: FAMILY MEDICINE

## 2023-01-16 RX ORDER — METOPROLOL SUCCINATE 25 MG/1
12.5 TABLET, EXTENDED RELEASE ORAL 2 TIMES DAILY
Qty: 90 TABLET | Refills: 1 | Status: SHIPPED | OUTPATIENT
Start: 2023-01-16 | End: 2023-07-15

## 2023-01-16 ASSESSMENT — ENCOUNTER SYMPTOMS: RESPIRATORY NEGATIVE: 1

## 2023-01-16 ASSESSMENT — PATIENT HEALTH QUESTIONNAIRE - PHQ9
SUM OF ALL RESPONSES TO PHQ QUESTIONS 1-9: 0
1. LITTLE INTEREST OR PLEASURE IN DOING THINGS: 0
SUM OF ALL RESPONSES TO PHQ9 QUESTIONS 1 & 2: 0
SUM OF ALL RESPONSES TO PHQ QUESTIONS 1-9: 0
2. FEELING DOWN, DEPRESSED OR HOPELESS: 0

## 2023-01-16 ASSESSMENT — LIFESTYLE VARIABLES
HOW MANY STANDARD DRINKS CONTAINING ALCOHOL DO YOU HAVE ON A TYPICAL DAY: 1 OR 2
HOW OFTEN DO YOU HAVE A DRINK CONTAINING ALCOHOL: 2-4 TIMES A MONTH

## 2023-01-16 NOTE — PROGRESS NOTES
PROGRESS NOTE    SUBJECTIVE:   Laura Bryant is a 75 y.o. male seen for a follow up visit regarding   Chief Complaint   Patient presents with    Medicare AWV    Knee Pain     Complains of left knee pain; reports bone on bone. Has had injections in the past; no relief lately from injection. Reports Ortho telling him he will need knee replacement. Reports tramadol not helping with pain        HPI:  Patient is here for follow-up and annual wellness visit. He is doing relatively well, needs a knee replacement according to his orthopedist however they are not wanting to do this surgery until he is been fully treated for his pulmonary embolus. Recently had blood work while in hospital with his pulmonary embolus, would like to go over the results. Reviewed and updated this visit by provider:           Review of Systems   Respiratory: Negative. Cardiovascular: Negative. OBJECTIVE:  Vitals:    01/16/23 1005   BP: (!) 160/88   Site: Left Upper Arm   Cuff Size: Large Adult   Pulse: (!) 102   SpO2: 96%   Weight: 217 lb 12.8 oz (98.8 kg)   Height: 5' 7\" (1.702 m)        Physical Exam   General: Alert and oriented x3, well-appearing  Neck: No adenopathy, thyromegaly or thyroid nodules  Pulmonary: Normal effort, good airflow, no rales or rhonchi  CVS: Regular rate and rhythm, normal S1, S2, no S3 or S4, no murmurs; no carotid bruits, 2+ pedal pulses      Medical problems and test results were reviewed with the patient today. No results found for this or any previous visit (from the past 672 hour(s)). No results found for any visits on 01/16/23. ASSESSMENT and PLAN    1. Essential (primary) hypertension  -     metoprolol succinate (TOPROL XL) 25 MG extended release tablet; Take 0.5 tablets by mouth in the morning and at bedtime, Disp-90 tablet, R-1Normal  2. Low serum parathyroid hormone (PTH)  -     Basic Metabolic Panel; Future  -     PTH, Intact; Future  3.  Acute pulmonary embolism, unspecified pulmonary embolism type, unspecified whether acute cor pulmonale present ([facility] [address]  4. Coronary artery disease involving native coronary artery of native heart without angina pectoris  5. Essential hypertension    Patient's blood pressure was elevated here today. Recheck blood pressure was at 150/90. Historically patient has had well-controlled blood pressures with current medication. We will continue to follow this closely. Return in 1 month for follow-up blood pressure. He has upcoming appointment next week with hematology to further investigate his pulmonary embolus. No follow-ups on file.        Shellie Francois MD

## 2023-01-17 LAB
ANION GAP SERPL CALC-SCNC: 8 MMOL/L (ref 2–11)
BUN SERPL-MCNC: 18 MG/DL (ref 8–23)
CALCIUM SERPL-MCNC: 10 MG/DL (ref 8.3–10.4)
CALCIUM SERPL-MCNC: 10.1 MG/DL (ref 8.3–10.4)
CHLORIDE SERPL-SCNC: 108 MMOL/L (ref 101–110)
CO2 SERPL-SCNC: 27 MMOL/L (ref 21–32)
CREAT SERPL-MCNC: 1 MG/DL (ref 0.8–1.5)
GLUCOSE SERPL-MCNC: 84 MG/DL (ref 65–100)
POTASSIUM SERPL-SCNC: 4.9 MMOL/L (ref 3.5–5.1)
PTH-INTACT SERPL-MCNC: 11.6 PG/ML (ref 18.5–88)
SODIUM SERPL-SCNC: 143 MMOL/L (ref 133–143)

## 2023-01-19 DIAGNOSIS — I26.99 ACUTE PULMONARY EMBOLISM, UNSPECIFIED PULMONARY EMBOLISM TYPE, UNSPECIFIED WHETHER ACUTE COR PULMONALE PRESENT (HCC): Primary | ICD-10-CM

## 2023-01-23 ENCOUNTER — OFFICE VISIT (OUTPATIENT)
Dept: ONCOLOGY | Age: 68
End: 2023-01-23
Payer: MEDICARE

## 2023-01-23 ENCOUNTER — HOSPITAL ENCOUNTER (OUTPATIENT)
Dept: LAB | Age: 68
Discharge: HOME | End: 2023-01-26
Payer: MEDICARE

## 2023-01-23 VITALS
WEIGHT: 219 LBS | BODY MASS INDEX: 34.37 KG/M2 | HEIGHT: 67 IN | RESPIRATION RATE: 14 BRPM | DIASTOLIC BLOOD PRESSURE: 88 MMHG | HEART RATE: 77 BPM | SYSTOLIC BLOOD PRESSURE: 136 MMHG | TEMPERATURE: 99.6 F | OXYGEN SATURATION: 97 %

## 2023-01-23 DIAGNOSIS — I82.491 ACUTE DEEP VEIN THROMBOSIS (DVT) OF OTHER SPECIFIED VEIN OF RIGHT LOWER EXTREMITY (HCC): ICD-10-CM

## 2023-01-23 DIAGNOSIS — Z79.01 ON CONTINUOUS ORAL ANTICOAGULATION: ICD-10-CM

## 2023-01-23 DIAGNOSIS — I26.99 ACUTE PULMONARY EMBOLISM, UNSPECIFIED PULMONARY EMBOLISM TYPE, UNSPECIFIED WHETHER ACUTE COR PULMONALE PRESENT (HCC): ICD-10-CM

## 2023-01-23 DIAGNOSIS — I26.99 ACUTE PULMONARY EMBOLISM, UNSPECIFIED PULMONARY EMBOLISM TYPE, UNSPECIFIED WHETHER ACUTE COR PULMONALE PRESENT (HCC): Primary | ICD-10-CM

## 2023-01-23 LAB
ALBUMIN SERPL-MCNC: 3.5 G/DL (ref 3.2–4.6)
ALBUMIN/GLOB SERPL: 0.9 (ref 0.4–1.6)
ALP SERPL-CCNC: 65 U/L (ref 50–136)
ALT SERPL-CCNC: 23 U/L (ref 12–65)
ANION GAP SERPL CALC-SCNC: 7 MMOL/L (ref 2–11)
AST SERPL-CCNC: 16 U/L (ref 15–37)
BASOPHILS # BLD: 0 K/UL (ref 0–0.2)
BASOPHILS NFR BLD: 0 % (ref 0–2)
BILIRUB SERPL-MCNC: 0.3 MG/DL (ref 0.2–1.1)
BUN SERPL-MCNC: 14 MG/DL (ref 8–23)
CALCIUM SERPL-MCNC: 10.7 MG/DL (ref 8.3–10.4)
CHLORIDE SERPL-SCNC: 107 MMOL/L (ref 101–110)
CO2 SERPL-SCNC: 27 MMOL/L (ref 21–32)
CREAT SERPL-MCNC: 1.2 MG/DL (ref 0.8–1.5)
DIFFERENTIAL METHOD BLD: NORMAL
EOSINOPHIL # BLD: 0.1 K/UL (ref 0–0.8)
EOSINOPHIL NFR BLD: 1 % (ref 0.5–7.8)
ERYTHROCYTE [DISTWIDTH] IN BLOOD BY AUTOMATED COUNT: 14 % (ref 11.9–14.6)
GLOBULIN SER CALC-MCNC: 3.9 G/DL (ref 2.8–4.5)
GLUCOSE SERPL-MCNC: 106 MG/DL (ref 65–100)
HCT VFR BLD AUTO: 44.5 % (ref 41.1–50.3)
HGB BLD-MCNC: 14.3 G/DL (ref 13.6–17.2)
IMM GRANULOCYTES # BLD AUTO: 0.1 K/UL (ref 0–0.5)
IMM GRANULOCYTES NFR BLD AUTO: 1 % (ref 0–5)
LYMPHOCYTES # BLD: 1.7 K/UL (ref 0.5–4.6)
LYMPHOCYTES NFR BLD: 19 % (ref 13–44)
MCH RBC QN AUTO: 31.4 PG (ref 26.1–32.9)
MCHC RBC AUTO-ENTMCNC: 32.1 G/DL (ref 31.4–35)
MCV RBC AUTO: 97.8 FL (ref 82–102)
MONOCYTES # BLD: 0.8 K/UL (ref 0.1–1.3)
MONOCYTES NFR BLD: 10 % (ref 4–12)
NEUTS SEG # BLD: 6 K/UL (ref 1.7–8.2)
NEUTS SEG NFR BLD: 69 % (ref 43–78)
NRBC # BLD: 0 K/UL (ref 0–0.2)
PLATELET # BLD AUTO: 227 K/UL (ref 150–450)
PMV BLD AUTO: 9.9 FL (ref 9.4–12.3)
POTASSIUM SERPL-SCNC: 4.2 MMOL/L (ref 3.5–5.1)
PROT SERPL-MCNC: 7.4 G/DL (ref 6.3–8.2)
RBC # BLD AUTO: 4.55 M/UL (ref 4.23–5.6)
SODIUM SERPL-SCNC: 141 MMOL/L (ref 133–143)
WBC # BLD AUTO: 8.7 K/UL (ref 4.3–11.1)

## 2023-01-23 PROCEDURE — 85025 COMPLETE CBC W/AUTO DIFF WBC: CPT

## 2023-01-23 PROCEDURE — 36415 COLL VENOUS BLD VENIPUNCTURE: CPT

## 2023-01-23 PROCEDURE — G8484 FLU IMMUNIZE NO ADMIN: HCPCS | Performed by: NURSE PRACTITIONER

## 2023-01-23 PROCEDURE — 80053 COMPREHEN METABOLIC PANEL: CPT

## 2023-01-23 PROCEDURE — 1036F TOBACCO NON-USER: CPT | Performed by: NURSE PRACTITIONER

## 2023-01-23 PROCEDURE — G8417 CALC BMI ABV UP PARAM F/U: HCPCS | Performed by: NURSE PRACTITIONER

## 2023-01-23 PROCEDURE — 3017F COLORECTAL CA SCREEN DOC REV: CPT | Performed by: NURSE PRACTITIONER

## 2023-01-23 PROCEDURE — 99214 OFFICE O/P EST MOD 30 MIN: CPT | Performed by: NURSE PRACTITIONER

## 2023-01-23 PROCEDURE — 3079F DIAST BP 80-89 MM HG: CPT | Performed by: NURSE PRACTITIONER

## 2023-01-23 PROCEDURE — 3075F SYST BP GE 130 - 139MM HG: CPT | Performed by: NURSE PRACTITIONER

## 2023-01-23 PROCEDURE — 1123F ACP DISCUSS/DSCN MKR DOCD: CPT | Performed by: NURSE PRACTITIONER

## 2023-01-23 PROCEDURE — G8427 DOCREV CUR MEDS BY ELIG CLIN: HCPCS | Performed by: NURSE PRACTITIONER

## 2023-01-23 ASSESSMENT — PATIENT HEALTH QUESTIONNAIRE - PHQ9
SUM OF ALL RESPONSES TO PHQ QUESTIONS 1-9: 0
3. TROUBLE FALLING OR STAYING ASLEEP: 0
SUM OF ALL RESPONSES TO PHQ QUESTIONS 1-9: 0
SUM OF ALL RESPONSES TO PHQ QUESTIONS 1-9: 0
2. FEELING DOWN, DEPRESSED OR HOPELESS: 0
SUM OF ALL RESPONSES TO PHQ QUESTIONS 1-9: 0

## 2023-01-23 NOTE — PROGRESS NOTES
[address]  VISIT         Patient Name: Home Adams              Date of Visit: 23     : 1955    Age: 75 y.o. Presenting Complaint:   Home Adams  is seen in follow-up for erythrocytosis. History of Present Illness:   Mr. Fran Hodges was seen for the first time in our office in 2020. He was a gentleman with a variety of well compensated medical problems. The most relevant of these is a diagnosis of hypogonadism  with a measured total testosterone of less than 3 in 2015. At some point shortly after that he was placed on testosterone. In , his hemoglobin was 13.5 and hematocrit 39.8. His testosterone regimen was initially urology. The patient at  the time of his initial visit with me was treated with 200 mg of Depo-Testosterone cypionate intramuscularly every 10 days. His last injection was 2019. He was seen in his primary care physician's office on 2019 with complaints  of back pain. Incidental labs drawn from that day included a hemoglobin of 18.3 and a hematocrit of 54.5. There were no other hemoglobin studies from between  and  available within our medical records or care everywhere. On 2020  testosterone level was drawn and was greater than 1500. The patient had no cough or shortness of breath. No recurrent headaches. He had no abnormalities on his CBC besides the elevated hemoglobin. He stopped smoking 5 years prior but had been a heavy  smoker (2 pack/day) prior. He was unaware of any diagnosis of COPD or emphysema prior to his visit with me. He returns today after two years - lost to follow up. Since last seen, he was hospitalized on  for PE/RLE DVT. He had just recently resumed testosterone replacement and he is a smoker. He also admitted to being pretty sedentary due to chronic back and left knee pain.   CT chest with large bilateral PE. He was started on heparin drip and IR performed bilateral thrombectomy. He was transitioned to Eliquis upon discharge. He has continued this and is tolerating without issue. His severe dyspnea and chest pain has resolved. He continues testosterone replacement therapy with no plans to quit despite the risk but he has quit smoking. He would like to get his left knee replaced but ortho has told him he cannot do this on anticoagulation. He denies any bleeding but he does notice easier bruising. Medications:      Current Outpatient Medications on File Prior to Visit   Medication Sig Dispense Refill    metoprolol succinate (TOPROL XL) 25 MG extended release tablet Take 0.5 tablets by mouth in the morning and at bedtime 90 tablet 1    apixaban (ELIQUIS) 5 MG TABS tablet Take 10 mg every 12 hours for 7 days (last dose on 11-15-22 at 9 PM), then decrease to 5 mg every 12 hours on 11-16-22 at 9 AM 60 tablet 3    aspirin 81 MG EC tablet Take 81 mg by mouth daily      atorvastatin (LIPITOR) 80 MG tablet TAKE 1 TABLET EVERY NIGHT      traMADol (ULTRAM) 50 MG tablet Take 50 mg by mouth every 6 hours as needed. nicotine (NICODERM CQ) 7 MG/24HR Place 1 patch onto the skin every 24 hours (Patient not taking: No sig reported) 30 patch 0    tiotropium (SPIRIVA RESPIMAT) 2.5 MCG/ACT AERS inhaler Inhale 2 puffs into the lungs daily (Patient not taking: No sig reported) 1 each 0     No current facility-administered medications on file prior to visit. Allergies: Allergies   Allergen Reactions    Methocarbamol Hives    Ciprofloxacin Nausea And Vomiting           Review of Systems: The Review of Systems is documented in full in the internal medical record. All systems are negative other than for those noted above. Past Medical History:   Active Ambulatory Problems     Diagnosis Date Noted    PAD (peripheral artery disease) ([facility] 06/11/2021    Atherosclerosis of both carotid arteries 2021    Obesity (BMI 35.0-39.9 without comorbidity) 2017    Noncompliance 2017    Essential hypertension 2017    Erythrocytosis 2020    Chronic low back pain with sciatica 2016    Hypogonadism in male 2016    Coronary artery disease involving native coronary artery of native heart without angina pectoris 2016    Arthralgia of both knees 10/15/2015    Fatigue 10/02/2015    Morbid obesity ([facility] [address] 2017    Muscle cramps at night 2015    History of kidney stones 10/02/2015    Mixed hyperlipidemia 2016    Acute pulmonary embolism, unspecified pulmonary embolism type, unspecified whether acute cor pulmonale present ([facility] [address] 2022    Sinus tachycardia 11/10/2022    Dizziness 11/10/2022     Resolved Ambulatory Problems     Diagnosis Date Noted    No Resolved Ambulatory Problems     Past Medical History:   Diagnosis Date    Arthritis     Asthma     Calculus of kidney     Hypercholesterolemia     Hypertension        Past Surgical History:  Past Surgical History:   Procedure Laterality Date    HEENT Bilateral     cataracts    [facility]  2022    [facility] 2022 [facility]    ORTHOPEDIC SURGERY      rotator cuff ten years ago           Social History:     Social History     Socioeconomic History    Marital status:      Spouse name: Not on file    Number of children: Not on file    Years of education: Not on file    Highest education level: Not on file   Occupational History    Not on file   Tobacco Use    Smoking status: Former     Types: Cigars     Quit date: 2022     Years since quittin.2     Passive exposure: Current    Smokeless tobacco: Never   Vaping Use    Vaping Use: Never used   Substance and Sexual Activity    Alcohol use:  Yes     Alcohol/week: 6.0 standard drinks     Types: 6 Cans of beer per week    Drug use: No    Sexual activity: Not on file   Other Topics Concern    Not on file   Social History Narrative    Not on file     Social Determinants of Health     Financial Resource Strain: Low Risk     Difficulty of Paying Living Expenses: Not hard at all   Food Insecurity: No Food Insecurity    Worried About Running Out of Food in the Last Year: Never true    Ran Out of Food in the Last Year: Never true   Transportation Needs: Not on file   Physical Activity: Inactive    Days of Exercise per Week: 0 days    Minutes of Exercise per Session: 0 min   Stress: Not on file   Social Connections: Not on file   Intimate Partner Violence: Not on file   Housing Stability: Not on file     Family History:     Family History   Problem Relation Age of Onset    Asthma Mother     Thyroid Disease Mother     COPD Mother     Heart Disease Father     Hypertension Father     Cancer Neg Hx     Deep Vein Thrombosis Neg Hx      Physical Examination:   General Appearance: Healthy appearing patient in no acute distress. Vital signs:    Visit Vitals  Vitals:    01/23/23 1325   BP: 136/88   Pulse: 77   Resp: 14   Temp: 99.6 °F (37.6 °C)   TempSrc: Oral   SpO2: 97%   Weight: 219 lb (99.3 kg)   Height: 5' 7\" (1.702 m)              Performance Status: ECOG Level  1   Distress Screening Score:   PHQ-9 Total Score: 0 (1/23/2023  1:28 PM)      HEENT: No oral or pharyngeal masses. There is no ulceration or thrush. Neck: Supple. There is no thyromegaly. Lungs: The lungs are clear to auscultation and percussion. There is no egophony. There is no chest wall tenderness and no  use of accessory respiratory musculature. Heart: There is no jugular venous distention. The rate is normal and rhythm regular. The S1 and S2 are normal and there are no murmurs or rubs. Abdomen: Soft, non-tender, bowel sounds present and normal, no appreciated hepatosplenomegaly. No palpable masses. Skin: No rash, petechiae or ecchymoses. No evidence of malignancy. Extremities: No cyanosis, clubbing or edema.           Labs/Imaging:     Hospital Outpatient Visit on 01/23/2023   Component Date Value Ref Range Status    WBC 01/23/2023 8.7  4.3 - 11.1 K/uL Final    RBC 01/23/2023 4.55  4.23 - 5.6 M/uL Final    Hemoglobin 01/23/2023 14.3  13.6 - 17.2 g/dL Final    Hematocrit 01/23/2023 44.5  41.1 - 50.3 % Final    MCV 01/23/2023 97.8  82.0 - 102.0 FL Final    MCH 01/23/2023 31.4  26.1 - 32.9 PG Final    MCHC 01/23/2023 32.1  31.4 - 35.0 g/dL Final    RDW 01/23/2023 14.0  11.9 - 14.6 % Final    Platelets 75/81/3413 227  150 - 450 K/uL Final    MPV 01/23/2023 9.9  9.4 - 12.3 FL Final    nRBC 01/23/2023 0.00  0.0 - 0.2 K/uL Final    **Note: Absolute NRBC parameter is now reported with Hemogram**    Differential Type 01/23/2023 AUTOMATED    Final    Seg Neutrophils 01/23/2023 69  43 - 78 % Final    Lymphocytes 01/23/2023 19  13 - 44 % Final    Monocytes 01/23/2023 10  4.0 - 12.0 % Final    Eosinophils % 01/23/2023 1  0.5 - 7.8 % Final    Basophils 01/23/2023 0  0.0 - 2.0 % Final    Immature Granulocytes 01/23/2023 1  0.0 - 5.0 % Final    Segs Absolute 01/23/2023 6.0  1.7 - 8.2 K/UL Final    Absolute Lymph # 01/23/2023 1.7  0.5 - 4.6 K/UL Final    Absolute Mono # 01/23/2023 0.8  0.1 - 1.3 K/UL Final    Absolute Eos # 01/23/2023 0.1  0.0 - 0.8 K/UL Final    Basophils Absolute 01/23/2023 0.0  0.0 - 0.2 K/UL Final    Absolute Immature Granulocyte 01/23/2023 0.1  0.0 - 0.5 K/UL Final    Sodium 01/23/2023 141  133 - 143 mmol/L Final    Potassium 01/23/2023 4.2  3.5 - 5.1 mmol/L Final    Chloride 01/23/2023 107  101 - 110 mmol/L Final    CO2 01/23/2023 27  21 - 32 mmol/L Final    Anion Gap 01/23/2023 7  2 - 11 mmol/L Final    Glucose 01/23/2023 106 (A)  65 - 100 mg/dL Final    BUN 01/23/2023 14  8 - 23 MG/DL Final    Creatinine 01/23/2023 1.20  0.8 - 1.5 MG/DL Final    Est, Glom Filt Rate 01/23/2023 >60  >60 ml/min/1.73m2 Final    Comment:      Pediatric calculator link: Nathalie.at. org/professionals/kdoqi/gfr_calculatorped       These results are not intended for use in patients <25years of age. eGFR results are calculated without a race factor using  the 2021 CKD-EPI equation. Careful clinical correlation is recommended, particularly when comparing to results calculated using previous equations. The CKD-EPI equation is less accurate in patients with extremes of muscle mass, extra-renal metabolism of creatinine, excessive creatine ingestion, or following therapy that affects renal tubular secretion. Calcium 01/23/2023 10.7 (A)  8.3 - 10.4 MG/DL Final    Total Bilirubin 01/23/2023 0.3  0.2 - 1.1 MG/DL Final    ALT 01/23/2023 23  12 - 65 U/L Final    AST 01/23/2023 16  15 - 37 U/L Final    Alk Phosphatase 01/23/2023 65  50 - 136 U/L Final    Total Protein 01/23/2023 7.4  6.3 - 8.2 g/dL Final    Albumin 01/23/2023 3.5  3.2 - 4.6 g/dL Final    Globulin 01/23/2023 3.9  2.8 - 4.5 g/dL Final    Albumin/Globulin Ratio 01/23/2023 0.9  0.4 - 1.6   Final                Above results reviewed with patient. ASSESSMENT and PLAN  This gentleman has an erythrocytosis with a significant smoking history and ongoing supplementation of testosterone with substantially elevated levels. Although this erythrocytosis could be secondary  to bone marrow dysfunction such as polycythemia vera or related to episodic hypoxemia as well as multiple other factors, it would seem as though the most striking here is that of a markedly elevated testosterone level. Temporary cessation of testosterone  replacement does seem to be having the desired impact on his red count with a gradual drop. He returns today after two years - lost to follow up. Since last seen, he was hospitalized on November 7 for PE/RLE DVT. He had just recently resumed testosterone replacement and he is a smoker. He also admitted to being pretty sedentary due to chronic back and left knee pain. CT chest with large bilateral PE. He was started on heparin drip and IR performed bilateral thrombectomy.  He was transitioned to [employer] upon discharge. He has continued this and is tolerating without issue. His severe dyspnea and chest pain has resolved. He continues testosterone replacement therapy with no plans to quit despite the risk but he has quit smoking. He would like to get his left knee replaced but ortho has told him he cannot do this on anticoagulation. He denies any bleeding but he does notice easier bruising. Labs reviewed and Hgb normal at 14.3. We will need to continue to monitor this due to his history of erthrocytosis. PCP working up his elevated Ca+. We discussed that we could possibly bridge him with Lovenox after 3-6 months on anticoagulation if ortho was willing to perform knee replacement. No need for further work up as this episode had multiple provoking factors. He will likely need to be on anticoagulation indefinitely due to sedentary state and testosterone replacement. Continue Eliquis 5 mg BID. Follow up in 3 months or sooner if needed.         RESUSCITATION DIRECTIVES/HOSPICE CARE;   Full Support                    JOCELYN Corbett  [address] Hematology Oncology  [address] [phone]

## 2023-02-01 ENCOUNTER — NURSE ONLY (OUTPATIENT)
Dept: UROLOGY | Age: 68
End: 2023-02-01

## 2023-02-01 DIAGNOSIS — E29.1 HYPOGONADISM IN MALE: Primary | ICD-10-CM

## 2023-02-01 RX ORDER — TESTOSTERONE CYPIONATE 200 MG/ML
100 INJECTION INTRAMUSCULAR ONCE
Status: COMPLETED | OUTPATIENT
Start: 2023-02-01 | End: 2023-02-01

## 2023-02-01 RX ADMIN — TESTOSTERONE CYPIONATE 100 MG: 200 INJECTION INTRAMUSCULAR at 16:02

## 2023-02-15 ENCOUNTER — OFFICE VISIT (OUTPATIENT)
Dept: CARDIOLOGY CLINIC | Age: 68
End: 2023-02-15
Payer: MEDICARE

## 2023-02-15 VITALS
SYSTOLIC BLOOD PRESSURE: 152 MMHG | HEIGHT: 67 IN | HEART RATE: 84 BPM | DIASTOLIC BLOOD PRESSURE: 90 MMHG | WEIGHT: 220 LBS | BODY MASS INDEX: 34.53 KG/M2

## 2023-02-15 DIAGNOSIS — I10 ESSENTIAL HYPERTENSION: ICD-10-CM

## 2023-02-15 DIAGNOSIS — E78.2 MIXED HYPERLIPIDEMIA: ICD-10-CM

## 2023-02-15 DIAGNOSIS — I42.9 CARDIOMYOPATHY, UNSPECIFIED TYPE (HCC): ICD-10-CM

## 2023-02-15 DIAGNOSIS — R93.1 AGATSTON CAC SCORE, >400: ICD-10-CM

## 2023-02-15 DIAGNOSIS — I25.10 CORONARY ARTERY DISEASE INVOLVING NATIVE CORONARY ARTERY OF NATIVE HEART WITHOUT ANGINA PECTORIS: Primary | ICD-10-CM

## 2023-02-15 DIAGNOSIS — I51.89 RIGHT VENTRICULAR SYSTOLIC DYSFUNCTION: ICD-10-CM

## 2023-02-15 DIAGNOSIS — I26.94 MULTIPLE SUBSEGMENTAL PULMONARY EMBOLI WITHOUT ACUTE COR PULMONALE (HCC): ICD-10-CM

## 2023-02-15 PROCEDURE — 1036F TOBACCO NON-USER: CPT | Performed by: INTERNAL MEDICINE

## 2023-02-15 PROCEDURE — 99214 OFFICE O/P EST MOD 30 MIN: CPT | Performed by: INTERNAL MEDICINE

## 2023-02-15 PROCEDURE — G8427 DOCREV CUR MEDS BY ELIG CLIN: HCPCS | Performed by: INTERNAL MEDICINE

## 2023-02-15 PROCEDURE — 3077F SYST BP >= 140 MM HG: CPT | Performed by: INTERNAL MEDICINE

## 2023-02-15 PROCEDURE — G8484 FLU IMMUNIZE NO ADMIN: HCPCS | Performed by: INTERNAL MEDICINE

## 2023-02-15 PROCEDURE — 3017F COLORECTAL CA SCREEN DOC REV: CPT | Performed by: INTERNAL MEDICINE

## 2023-02-15 PROCEDURE — G8417 CALC BMI ABV UP PARAM F/U: HCPCS | Performed by: INTERNAL MEDICINE

## 2023-02-15 PROCEDURE — 1123F ACP DISCUSS/DSCN MKR DOCD: CPT | Performed by: INTERNAL MEDICINE

## 2023-02-15 PROCEDURE — 3080F DIAST BP >= 90 MM HG: CPT | Performed by: INTERNAL MEDICINE

## 2023-02-15 ASSESSMENT — ENCOUNTER SYMPTOMS
SHORTNESS OF BREATH: 0
COUGH: 0
GASTROINTESTINAL NEGATIVE: 1

## 2023-02-15 NOTE — PROGRESS NOTES
[id number], [address], [facility]      Patient:  Pérez Duke  1955         SUBJECTIVE:  Pérez Duke is a  75 y.o. male seen for a follow up visit regarding the following:     Chief Complaint   Patient presents with    Establish Cardiologist    Coronary Artery Disease     CC: follow up Cad, HTN     HPI:   75 y.o. male with a history of CAD, coronary calcification, HTN, HLD, tobacco use, bilateral PE 11/7/22 who is here for follow up. Patient was last seen in office on 11/15/22 with Dr Dana Davison , since then reports that he has been doing well. Taking his medications as directed. Tolerating blood thinner. Quit smoking in Nov 2022. No chest pain , chest pressure. Knee pain. No bleeding, no GI/ bleeding. Easy bruising. No other complaints at this time. Reports he has been doing well. Has been following with hematology. Cardiovascular Testing:  - Echo 11/8/22: LVEF 45-50 %, RV mildly dilated/mildly reduced RVEF, Valves: mild MR. Past medical history, past surgical history, family history, social history, and medications were all reviewed with the patient today and updated as necessary.          Patient Active Problem List    Diagnosis Date Noted    Sinus tachycardia 11/10/2022     Priority: High    Dizziness 11/10/2022     Priority: High    Acute pulmonary embolism, unspecified pulmonary embolism type, unspecified whether acute cor pulmonale present ([address] 11/07/2022     Priority: Medium    Atherosclerosis of both carotid arteries 11/08/2021    PAD (peripheral artery disease) ([address] 06/11/2021    Erythrocytosis 01/28/2020    Obesity (BMI 35.0-39.9 without comorbidity) 12/13/2017    Morbid obesity ([address] 12/05/2017    Noncompliance 05/01/2017    Essential hypertension 05/01/2017    Coronary artery disease involving native coronary artery of native heart without angina pectoris 11/07/2016    Hypogonadism in male 09/19/2016    Chronic low back pain with sciatica 2016    Mixed hyperlipidemia 2016    Muscle cramps at night 2015    Arthralgia of both knees 10/15/2015    Fatigue 10/02/2015    History of kidney stones 10/02/2015       Family History   Problem Relation Age of Onset    Asthma Mother     Thyroid Disease Mother     COPD Mother     Heart Disease Father     Hypertension Father     Cancer Neg Hx     Deep Vein Thrombosis Neg Hx        Social History     Tobacco Use    Smoking status: Former     Types: Cigars     Quit date: 2022     Years since quittin.2     Passive exposure: Current    Smokeless tobacco: Never   Substance Use Topics    Alcohol use: Yes     Alcohol/week: 6.0 standard drinks     Types: 6 Cans of beer per week     Review of Systems   Constitutional: Negative. Negative for chills and fever. Cardiovascular: Negative. Negative for chest pain, dyspnea on exertion and leg swelling. Respiratory:  Negative for cough and shortness of breath. Hematologic/Lymphatic: Negative for bleeding problem. Bruises/bleeds easily (bruising). Musculoskeletal:  Positive for joint pain (knees). Gastrointestinal: Negative. Neurological: Negative. PHYSICAL EXAM:    BP (!) 152/90   Pulse 84   Ht 5' 7\" (1.702 m)   Wt 220 lb (99.8 kg)   BMI 34.46 kg/m²     Physical Exam  Vitals reviewed. Constitutional:       Appearance: He is not ill-appearing or diaphoretic. HENT:      Head: Normocephalic and atraumatic. Cardiovascular:      Rate and Rhythm: Normal rate and regular rhythm. Heart sounds: Normal heart sounds. No murmur heard. No friction rub. No gallop. Pulmonary:      Effort: Pulmonary effort is normal. No respiratory distress. Breath sounds: No stridor. No wheezing, rhonchi or rales. Musculoskeletal:         General: No swelling. Right lower leg: No edema. Left lower leg: No edema. Skin:     General: Skin is warm and dry. Findings: Bruising (arms bilaterally) present. Neurological:      Mental Status: He is alert and oriented to person, place, and time. Gait: Gait abnormal (left shoe with lift). Psychiatric:         Mood and Affect: Mood normal.         Behavior: Behavior normal.       Medical problems, medical history, and test results were reviewed with the patient today. No results found for this or any previous visit (from the past 168 hour(s)). Current Outpatient Medications:     metoprolol succinate (TOPROL XL) 25 MG extended release tablet, Take 0.5 tablets by mouth in the morning and at bedtime, Disp: 90 tablet, Rfl: 1    apixaban (ELIQUIS) 5 MG TABS tablet, Take 10 mg every 12 hours for 7 days (last dose on 11-15-22 at 9 PM), then decrease to 5 mg every 12 hours on 11-16-22 at 9 AM, Disp: 60 tablet, Rfl: 3    aspirin 81 MG EC tablet, Take 81 mg by mouth daily, Disp: , Rfl:     atorvastatin (LIPITOR) 80 MG tablet, TAKE 1 TABLET EVERY NIGHT, Disp: , Rfl:     traMADol (ULTRAM) 50 MG tablet, Take 50 mg by mouth every 6 hours as needed. , Disp: , Rfl:     nicotine (NICODERM CQ) 7 MG/24HR, Place 1 patch onto the skin every 24 hours (Patient not taking: No sig reported), Disp: 30 patch, Rfl: 0    tiotropium (SPIRIVA RESPIMAT) 2.5 MCG/ACT AERS inhaler, Inhale 2 puffs into the lungs daily (Patient not taking: No sig reported), Disp: 1 each, Rfl: 0     ASSESSMENT/PLAN:    Cardiovascular Testing:  - Echo 11/8/22: LVEF 45-50 %, RV mildly dilated/mildly reduced RVEF, Valves: mild MR.     1. Coronary artery disease involving native coronary artery of native heart without angina pectoris  2. Agatston CAC score, >400  - no angina, continue ASA 81, Atorvastatin 80     3. Mixed hyperlipidemia  - continue Atorvastatin 80   - check fasting lipids/CMP.      4. Essential hypertension  - patient with elevated BP on check today  - will have the patient check BP at home on a daily basis and bring log to next visit   - goal per SPRINT guidelines <130/80 mmHg  - will review log at next visit for modification of the patient's anti-hypertensive regimen    5. Cardiomyopathy, unspecified type ([address]  6. Right ventricular systolic dysfunction  -Mild cardiomyopathy with mild right ventricular systolic dysfunction in the setting of massive PE. Does not appear volume overloaded on examination today, no evidence of CHF. Continue Toprol. Repeat echo at 6-month interval to assess for improvement in LVEF/RVEF. Consideration for cardiac MRI if RV function has not returned to normal.    7. Multiple subsegmental pulmonary emboli without acute cor pulmonale (HCC)  -Remains on anticoagulation with Eliquis 5 twice daily. Tolerating well. Follows with hematology. Problem List Items Addressed This Visit          Circulatory    Essential hypertension    Coronary artery disease involving native coronary artery of native heart without angina pectoris - Primary    Relevant Orders    Transthoracic echocardiogram (TTE) complete with contrast, bubble, strain, and 3D PRN       Other    Mixed hyperlipidemia    Relevant Orders    Transthoracic echocardiogram (TTE) complete with contrast, bubble, strain, and 3D PRN     Other Visit Diagnoses       Agatston CAC score, >400        Cardiomyopathy, unspecified type ([address]        Relevant Orders    Transthoracic echocardiogram (TTE) complete with contrast, bubble, strain, and 3D PRN    Right ventricular systolic dysfunction        Multiple subsegmental pulmonary emboli without acute cor pulmonale ([address]        Relevant Orders    Transthoracic echocardiogram (TTE) complete with contrast, bubble, strain, and 3D PRN            Instructed patient go to ER or call 911/EMS should symptoms recur or worsen. Patient has been instructed and agrees to call our office with any issues or other concerns related to their cardiac condition(s) and/or complaint(s). No follow-ups on file.     Roque Vinson DO  2/15/2023 2:45 PM

## 2023-02-22 ENCOUNTER — NURSE ONLY (OUTPATIENT)
Dept: UROLOGY | Age: 68
End: 2023-02-22

## 2023-02-22 DIAGNOSIS — E29.1 HYPOGONADISM IN MALE: Primary | ICD-10-CM

## 2023-02-22 RX ORDER — TESTOSTERONE CYPIONATE 200 MG/ML
100 INJECTION INTRAMUSCULAR ONCE
Status: COMPLETED | OUTPATIENT
Start: 2023-02-22 | End: 2023-02-22

## 2023-02-22 RX ADMIN — TESTOSTERONE CYPIONATE 100 MG: 200 INJECTION INTRAMUSCULAR at 16:11

## 2023-02-22 NOTE — PROGRESS NOTES
Per 's lab result note from 10/31/2022, patient due to receive 100mg testosterone injection every 3 weeks.   Patient received injection IM RUOQ today without incident.   Testosterone injections authorized by Faviola TELLEZ stating: NO EBENEZER JORDANQ

## 2023-03-08 ENCOUNTER — HOSPITAL ENCOUNTER (INPATIENT)
Age: 68
LOS: 3 days | Discharge: HOME | DRG: 270 | End: 2023-03-11
Attending: EMERGENCY MEDICINE | Admitting: INTERNAL MEDICINE
Payer: MEDICARE

## 2023-03-08 ENCOUNTER — APPOINTMENT (OUTPATIENT)
Dept: INTERVENTIONAL RADIOLOGY/VASCULAR | Age: 68
DRG: 270 | End: 2023-03-08
Payer: MEDICARE

## 2023-03-08 ENCOUNTER — APPOINTMENT (OUTPATIENT)
Dept: ULTRASOUND IMAGING | Age: 68
DRG: 270 | End: 2023-03-08
Payer: MEDICARE

## 2023-03-08 ENCOUNTER — APPOINTMENT (OUTPATIENT)
Dept: CT IMAGING | Age: 68
DRG: 270 | End: 2023-03-08
Payer: MEDICARE

## 2023-03-08 DIAGNOSIS — G47.33 OSA (OBSTRUCTIVE SLEEP APNEA): ICD-10-CM

## 2023-03-08 DIAGNOSIS — M79.89 LEG SWELLING: ICD-10-CM

## 2023-03-08 DIAGNOSIS — I26.99 BILATERAL PULMONARY EMBOLISM (HCC): ICD-10-CM

## 2023-03-08 DIAGNOSIS — I26.09 OTHER ACUTE PULMONARY EMBOLISM WITH ACUTE COR PULMONALE (HCC): Primary | ICD-10-CM

## 2023-03-08 DIAGNOSIS — J96.01 ACUTE RESPIRATORY FAILURE WITH HYPOXIA (HCC): ICD-10-CM

## 2023-03-08 PROBLEM — I50.42 CHRONIC COMBINED SYSTOLIC AND DIASTOLIC CONGESTIVE HEART FAILURE (HCC): Status: ACTIVE | Noted: 2023-03-08

## 2023-03-08 PROBLEM — M79.605 PAIN IN BOTH LOWER EXTREMITIES: Status: ACTIVE | Noted: 2023-03-08

## 2023-03-08 PROBLEM — M79.604 PAIN IN BOTH LOWER EXTREMITIES: Status: ACTIVE | Noted: 2023-03-08

## 2023-03-08 PROBLEM — R29.898 LUE WEAKNESS: Status: ACTIVE | Noted: 2023-03-08

## 2023-03-08 LAB
ALBUMIN SERPL-MCNC: 3.6 G/DL (ref 3.2–4.6)
ALBUMIN/GLOB SERPL: 0.7 (ref 0.4–1.6)
ALP SERPL-CCNC: 76 U/L (ref 50–136)
ALT SERPL-CCNC: 28 U/L (ref 12–65)
ANION GAP SERPL CALC-SCNC: 6 MMOL/L (ref 2–11)
APTT PPP: 99.5 SEC (ref 24.5–34.2)
ARTERIAL PATENCY WRIST A: POSITIVE
AST SERPL-CCNC: 23 U/L (ref 15–37)
BASE EXCESS BLD CALC-SCNC: 0.4 MMOL/L
BASOPHILS # BLD: 0.1 K/UL (ref 0–0.2)
BASOPHILS NFR BLD: 1 % (ref 0–2)
BDY SITE: ABNORMAL
BILIRUB SERPL-MCNC: 0.5 MG/DL (ref 0.2–1.1)
BUN SERPL-MCNC: 24 MG/DL (ref 8–23)
CALCIUM SERPL-MCNC: 11.1 MG/DL (ref 8.3–10.4)
CHLORIDE SERPL-SCNC: 104 MMOL/L (ref 101–110)
CO2 BLD-SCNC: 22 MMOL/L (ref 13–23)
CO2 SERPL-SCNC: 25 MMOL/L (ref 21–32)
CREAT SERPL-MCNC: 1.4 MG/DL (ref 0.8–1.5)
DIFFERENTIAL METHOD BLD: ABNORMAL
EKG ATRIAL RATE: 146 BPM
EKG DIAGNOSIS: NORMAL
EKG P AXIS: 69 DEGREES
EKG P-R INTERVAL: 119 MS
EKG Q-T INTERVAL: 275 MS
EKG QRS DURATION: 88 MS
EKG QTC CALCULATION (BAZETT): 426 MS
EKG R AXIS: 77 DEGREES
EKG T AXIS: -16 DEGREES
EKG VENTRICULAR RATE: 144 BPM
EOSINOPHIL # BLD: 0.1 K/UL (ref 0–0.8)
EOSINOPHIL NFR BLD: 1 % (ref 0.5–7.8)
ERYTHROCYTE [DISTWIDTH] IN BLOOD BY AUTOMATED COUNT: 13.9 % (ref 11.9–14.6)
ERYTHROCYTE [DISTWIDTH] IN BLOOD BY AUTOMATED COUNT: 14 % (ref 11.9–14.6)
GAS FLOW.O2 O2 DELIVERY SYS: ABNORMAL
GLOBULIN SER CALC-MCNC: 4.9 G/DL (ref 2.8–4.5)
GLUCOSE SERPL-MCNC: 132 MG/DL (ref 65–100)
HCO3 BLD-SCNC: 23.5 MMOL/L (ref 22–26)
HCT VFR BLD AUTO: 50.1 % (ref 41.1–50.3)
HCT VFR BLD AUTO: 56.6 % (ref 41.1–50.3)
HGB BLD-MCNC: 15.9 G/DL (ref 13.6–17.2)
HGB BLD-MCNC: 18.3 G/DL (ref 13.6–17.2)
IMM GRANULOCYTES # BLD AUTO: 0.2 K/UL (ref 0–0.5)
IMM GRANULOCYTES NFR BLD AUTO: 2 % (ref 0–5)
INR PPP: 1.1
LACTATE SERPL-SCNC: 1.8 MMOL/L (ref 0.4–2)
LYMPHOCYTES # BLD: 1.6 K/UL (ref 0.5–4.6)
LYMPHOCYTES NFR BLD: 11 % (ref 13–44)
MAGNESIUM SERPL-MCNC: 2.2 MG/DL (ref 1.8–2.4)
MCH RBC QN AUTO: 31.4 PG (ref 26.1–32.9)
MCH RBC QN AUTO: 31.5 PG (ref 26.1–32.9)
MCHC RBC AUTO-ENTMCNC: 31.7 G/DL (ref 31.4–35)
MCHC RBC AUTO-ENTMCNC: 32.3 G/DL (ref 31.4–35)
MCV RBC AUTO: 97.1 FL (ref 82–102)
MCV RBC AUTO: 99.2 FL (ref 82–102)
MONOCYTES # BLD: 1 K/UL (ref 0.1–1.3)
MONOCYTES NFR BLD: 7 % (ref 4–12)
NEUTS SEG # BLD: 11.9 K/UL (ref 1.7–8.2)
NEUTS SEG NFR BLD: 78 % (ref 43–78)
NRBC # BLD: 0 K/UL (ref 0–0.2)
NRBC # BLD: 0 K/UL (ref 0–0.2)
NT PRO BNP: 293 PG/ML (ref 5–125)
PCO2 BLD: 33.7 MMHG (ref 35–45)
PH BLD: 7.45 (ref 7.35–7.45)
PLATELET # BLD AUTO: 146 K/UL (ref 150–450)
PLATELET # BLD AUTO: 160 K/UL (ref 150–450)
PMV BLD AUTO: 10.3 FL (ref 9.4–12.3)
PMV BLD AUTO: 10.7 FL (ref 9.4–12.3)
PO2 BLD: 58 MMHG (ref 75–100)
POC FIO2: 10
POTASSIUM SERPL-SCNC: 4.3 MMOL/L (ref 3.5–5.1)
PROCALCITONIN SERPL-MCNC: <0.05 NG/ML (ref 0–0.49)
PROT SERPL-MCNC: 8.5 G/DL (ref 6.3–8.2)
PROTHROMBIN TIME: 14.7 SEC (ref 12.6–14.3)
RBC # BLD AUTO: 5.05 M/UL (ref 4.23–5.6)
RBC # BLD AUTO: 5.83 M/UL (ref 4.23–5.6)
SAO2 % BLD: 91.3 % (ref 95–98)
SERVICE CMNT-IMP: ABNORMAL
SODIUM SERPL-SCNC: 135 MMOL/L (ref 133–143)
SPECIMEN TYPE: ABNORMAL
TROPONIN I SERPL HS-MCNC: 287.1 PG/ML (ref 0–14)
UFH PPP CHRO-ACNC: 0.34 IU/ML (ref 0.3–0.7)
UFH PPP CHRO-ACNC: 0.68 IU/ML (ref 0.3–0.7)
WBC # BLD AUTO: 14.4 K/UL (ref 4.3–11.1)
WBC # BLD AUTO: 14.9 K/UL (ref 4.3–11.1)

## 2023-03-08 PROCEDURE — 85610 PROTHROMBIN TIME: CPT

## 2023-03-08 PROCEDURE — 2580000003 HC RX 258: Performed by: RADIOLOGY

## 2023-03-08 PROCEDURE — 85027 COMPLETE CBC AUTOMATED: CPT

## 2023-03-08 PROCEDURE — 96365 THER/PROPH/DIAG IV INF INIT: CPT

## 2023-03-08 PROCEDURE — 93005 ELECTROCARDIOGRAM TRACING: CPT | Performed by: EMERGENCY MEDICINE

## 2023-03-08 PROCEDURE — 85520 HEPARIN ASSAY: CPT

## 2023-03-08 PROCEDURE — 36600 WITHDRAWAL OF ARTERIAL BLOOD: CPT

## 2023-03-08 PROCEDURE — 85730 THROMBOPLASTIN TIME PARTIAL: CPT

## 2023-03-08 PROCEDURE — 6360000004 HC RX CONTRAST MEDICATION: Performed by: RADIOLOGY

## 2023-03-08 PROCEDURE — 2580000003 HC RX 258: Performed by: EMERGENCY MEDICINE

## 2023-03-08 PROCEDURE — 84145 PROCALCITONIN (PCT): CPT

## 2023-03-08 PROCEDURE — 83735 ASSAY OF MAGNESIUM: CPT

## 2023-03-08 PROCEDURE — 6360000004 HC RX CONTRAST MEDICATION: Performed by: EMERGENCY MEDICINE

## 2023-03-08 PROCEDURE — 99152 MOD SED SAME PHYS/QHP 5/>YRS: CPT

## 2023-03-08 PROCEDURE — 80053 COMPREHEN METABOLIC PANEL: CPT

## 2023-03-08 PROCEDURE — 36415 COLL VENOUS BLD VENIPUNCTURE: CPT

## 2023-03-08 PROCEDURE — 83880 ASSAY OF NATRIURETIC PEPTIDE: CPT

## 2023-03-08 PROCEDURE — 96376 TX/PRO/DX INJ SAME DRUG ADON: CPT

## 2023-03-08 PROCEDURE — 83605 ASSAY OF LACTIC ACID: CPT

## 2023-03-08 PROCEDURE — 6360000002 HC RX W HCPCS: Performed by: RADIOLOGY

## 2023-03-08 PROCEDURE — 6360000002 HC RX W HCPCS: Performed by: EMERGENCY MEDICINE

## 2023-03-08 PROCEDURE — 2500000003 HC RX 250 WO HCPCS: Performed by: RADIOLOGY

## 2023-03-08 PROCEDURE — 71260 CT THORAX DX C+: CPT | Performed by: EMERGENCY MEDICINE

## 2023-03-08 PROCEDURE — 82803 BLOOD GASES ANY COMBINATION: CPT

## 2023-03-08 PROCEDURE — 85025 COMPLETE CBC W/AUTO DIFF WBC: CPT

## 2023-03-08 PROCEDURE — 99223 1ST HOSP IP/OBS HIGH 75: CPT | Performed by: INTERNAL MEDICINE

## 2023-03-08 PROCEDURE — 94640 AIRWAY INHALATION TREATMENT: CPT

## 2023-03-08 PROCEDURE — 6360000002 HC RX W HCPCS: Performed by: INTERNAL MEDICINE

## 2023-03-08 PROCEDURE — 2000000000 HC ICU R&B

## 2023-03-08 PROCEDURE — 85384 FIBRINOGEN ACTIVITY: CPT

## 2023-03-08 PROCEDURE — 2700000000 HC OXYGEN THERAPY PER DAY

## 2023-03-08 PROCEDURE — 84484 ASSAY OF TROPONIN QUANT: CPT

## 2023-03-08 PROCEDURE — 87040 BLOOD CULTURE FOR BACTERIA: CPT

## 2023-03-08 PROCEDURE — 99285 EMERGENCY DEPT VISIT HI MDM: CPT

## 2023-03-08 RX ORDER — FENTANYL CITRATE 50 UG/ML
INJECTION, SOLUTION INTRAMUSCULAR; INTRAVENOUS
Status: COMPLETED | OUTPATIENT
Start: 2023-03-08 | End: 2023-03-08

## 2023-03-08 RX ORDER — HEPARIN SODIUM 1000 [USP'U]/ML
80 INJECTION, SOLUTION INTRAVENOUS; SUBCUTANEOUS ONCE
Status: COMPLETED | OUTPATIENT
Start: 2023-03-08 | End: 2023-03-08

## 2023-03-08 RX ORDER — 0.9 % SODIUM CHLORIDE 0.9 %
100 INTRAVENOUS SOLUTION INTRAVENOUS ONCE
Status: COMPLETED | OUTPATIENT
Start: 2023-03-08 | End: 2023-03-08

## 2023-03-08 RX ORDER — HEPARIN SODIUM 10000 [USP'U]/100ML
400 INJECTION, SOLUTION INTRAVENOUS CONTINUOUS
Status: DISCONTINUED | OUTPATIENT
Start: 2023-03-08 | End: 2023-03-09

## 2023-03-08 RX ORDER — HEPARIN SODIUM 1000 [USP'U]/ML
80 INJECTION, SOLUTION INTRAVENOUS; SUBCUTANEOUS PRN
Status: DISCONTINUED | OUTPATIENT
Start: 2023-03-08 | End: 2023-03-08

## 2023-03-08 RX ORDER — BUDESONIDE 0.5 MG/2ML
0.5 INHALANT ORAL 2 TIMES DAILY
Status: DISCONTINUED | OUTPATIENT
Start: 2023-03-08 | End: 2023-03-11 | Stop reason: HOSPADM

## 2023-03-08 RX ORDER — HEPARIN SODIUM 1000 [USP'U]/ML
40 INJECTION, SOLUTION INTRAVENOUS; SUBCUTANEOUS PRN
Status: DISCONTINUED | OUTPATIENT
Start: 2023-03-08 | End: 2023-03-09

## 2023-03-08 RX ORDER — LEVALBUTEROL INHALATION SOLUTION 0.63 MG/3ML
0.63 SOLUTION RESPIRATORY (INHALATION) 3 TIMES DAILY
Status: DISCONTINUED | OUTPATIENT
Start: 2023-03-08 | End: 2023-03-08 | Stop reason: CLARIF

## 2023-03-08 RX ORDER — ALBUTEROL SULFATE 2.5 MG/3ML
2.5 SOLUTION RESPIRATORY (INHALATION) 3 TIMES DAILY
Status: DISCONTINUED | OUTPATIENT
Start: 2023-03-08 | End: 2023-03-11 | Stop reason: HOSPADM

## 2023-03-08 RX ORDER — HEPARIN SODIUM 10000 [USP'U]/100ML
5-30 INJECTION, SOLUTION INTRAVENOUS CONTINUOUS
Status: DISCONTINUED | OUTPATIENT
Start: 2023-03-08 | End: 2023-03-08

## 2023-03-08 RX ORDER — HEPARIN SODIUM 1000 [USP'U]/ML
80 INJECTION, SOLUTION INTRAVENOUS; SUBCUTANEOUS PRN
Status: DISCONTINUED | OUTPATIENT
Start: 2023-03-08 | End: 2023-03-09

## 2023-03-08 RX ORDER — MIDAZOLAM HYDROCHLORIDE 1 MG/ML
INJECTION, SOLUTION INTRAMUSCULAR; INTRAVENOUS
Status: COMPLETED | OUTPATIENT
Start: 2023-03-08 | End: 2023-03-08

## 2023-03-08 RX ORDER — LIDOCAINE HYDROCHLORIDE 20 MG/ML
INJECTION, SOLUTION INFILTRATION; PERINEURAL
Status: COMPLETED | OUTPATIENT
Start: 2023-03-08 | End: 2023-03-08

## 2023-03-08 RX ORDER — HEPARIN SODIUM 1000 [USP'U]/ML
80 INJECTION, SOLUTION INTRAVENOUS; SUBCUTANEOUS ONCE
Status: DISCONTINUED | OUTPATIENT
Start: 2023-03-08 | End: 2023-03-08

## 2023-03-08 RX ORDER — HEPARIN SODIUM 1000 [USP'U]/ML
40 INJECTION, SOLUTION INTRAVENOUS; SUBCUTANEOUS PRN
Status: DISCONTINUED | OUTPATIENT
Start: 2023-03-08 | End: 2023-03-08

## 2023-03-08 RX ORDER — SODIUM CHLORIDE 0.9 % (FLUSH) 0.9 %
10 SYRINGE (ML) INJECTION
Status: COMPLETED | OUTPATIENT
Start: 2023-03-08 | End: 2023-03-08

## 2023-03-08 RX ORDER — 0.9 % SODIUM CHLORIDE 0.9 %
1000 INTRAVENOUS SOLUTION INTRAVENOUS
Status: COMPLETED | OUTPATIENT
Start: 2023-03-08 | End: 2023-03-08

## 2023-03-08 RX ADMIN — LIDOCAINE HYDROCHLORIDE 5 ML: 20 INJECTION, SOLUTION INFILTRATION; PERINEURAL at 17:01

## 2023-03-08 RX ADMIN — FENTANYL CITRATE 25 MCG: 50 INJECTION, SOLUTION INTRAMUSCULAR; INTRAVENOUS at 16:57

## 2023-03-08 RX ADMIN — MIDAZOLAM HYDROCHLORIDE 0.5 MG: 1 INJECTION, SOLUTION INTRAMUSCULAR; INTRAVENOUS at 17:02

## 2023-03-08 RX ADMIN — IOPAMIDOL 100 ML: 755 INJECTION, SOLUTION INTRAVENOUS at 13:30

## 2023-03-08 RX ADMIN — SODIUM CHLORIDE, PRESERVATIVE FREE 10 ML: 5 INJECTION INTRAVENOUS at 13:31

## 2023-03-08 RX ADMIN — MIDAZOLAM HYDROCHLORIDE 0.5 MG: 1 INJECTION, SOLUTION INTRAMUSCULAR; INTRAVENOUS at 16:57

## 2023-03-08 RX ADMIN — SODIUM CHLORIDE 1000 ML: 9 INJECTION, SOLUTION INTRAVENOUS at 14:03

## 2023-03-08 RX ADMIN — IOPAMIDOL 15 ML: 612 INJECTION, SOLUTION INTRAVENOUS at 17:15

## 2023-03-08 RX ADMIN — HEPARIN SODIUM 18 UNITS/KG/HR: 10000 INJECTION, SOLUTION INTRAVENOUS at 13:58

## 2023-03-08 RX ADMIN — IPRATROPIUM BROMIDE 0.5 MG: 0.5 SOLUTION RESPIRATORY (INHALATION) at 19:16

## 2023-03-08 RX ADMIN — ALTEPLASE 0.5 MG/HR: KIT at 17:45

## 2023-03-08 RX ADMIN — SODIUM CHLORIDE 100 ML: 9 INJECTION, SOLUTION INTRAVENOUS at 13:31

## 2023-03-08 RX ADMIN — HEPARIN SODIUM AND DEXTROSE 400 UNITS/HR: 10000; 5 INJECTION INTRAVENOUS at 17:00

## 2023-03-08 RX ADMIN — FENTANYL CITRATE 25 MCG: 50 INJECTION, SOLUTION INTRAMUSCULAR; INTRAVENOUS at 17:02

## 2023-03-08 RX ADMIN — HEPARIN SODIUM 8340 UNITS: 1000 INJECTION INTRAVENOUS; SUBCUTANEOUS at 13:59

## 2023-03-08 RX ADMIN — BUDESONIDE 500 MCG: 0.5 INHALANT RESPIRATORY (INHALATION) at 19:16

## 2023-03-08 RX ADMIN — ALBUTEROL SULFATE 2.5 MG: 2.5 SOLUTION RESPIRATORY (INHALATION) at 19:16

## 2023-03-08 ASSESSMENT — PAIN SCALES - GENERAL: PAINLEVEL_OUTOF10: 0

## 2023-03-08 ASSESSMENT — ENCOUNTER SYMPTOMS: SHORTNESS OF BREATH: 1

## 2023-03-08 ASSESSMENT — PAIN DESCRIPTION - LOCATION: LOCATION: CHEST

## 2023-03-08 ASSESSMENT — PAIN - FUNCTIONAL ASSESSMENT: PAIN_FUNCTIONAL_ASSESSMENT: 0-10

## 2023-03-08 NOTE — PROGRESS NOTES
Patient transported to ICU 3102 with this RN and PRAFUL Miller. Patient transported on cardiac monitor without incident. Bedside verbal report to receiving RN.

## 2023-03-08 NOTE — PRE SEDATION
Sedation Pre-Procedure Note    Patient Name: Riya Hedrick   YOB: 1955  [facility] [place]  Medical Record Number: [id number]  Date: 3/8/2023   Time: 5:19 PM       Indication:  pe    Consent: I have discussed with the patient and/or the patient representative the indication, alternatives, and the possible risks and/or complications of the planned procedure and the anesthesia methods. The patient and/or patient representative appear to understand and agree to proceed. Vital Signs:   Vitals:    03/08/23 1717   BP: 102/82   Pulse: (!) 134   Resp:    Temp:    SpO2: 91%       Past Medical History:   has a past medical history of Arthritis, Asthma, Calculus of kidney, Hypercholesterolemia, and Hypertension. Past Surgical History:   has a past surgical history that includes orthopedic surgery; heent (Bilateral); and IR GUIDED THROMB [id number] (11/8/2022). Medications:   Scheduled Meds:   Continuous Infusions:    heparin (PORCINE) Infusion 18 Units/kg/hr (03/08/23 1358)    alteplase (ACTIVASE) in 0.9% sodium chloride infusion (EKOS catheter)      alteplase (ACTIVASE) in 0.9% sodium chloride infusion (EKOS catheter)       PRN Meds: heparin (porcine), heparin (porcine)  Home Meds:   Prior to Admission medications    Medication Sig Start Date End Date Taking?  Authorizing Provider   metoprolol succinate (TOPROL XL) 25 MG extended release tablet Take 0.5 tablets by mouth in the morning and at bedtime 1/16/23 7/15/23  Alejandrina Hernandez MD   apixaban (ELIQUIS) 5 MG TABS tablet Take 10 mg every 12 hours for 7 days (last dose on 11-15-22 at 9 PM), then decrease to 5 mg every 12 hours on 11-16-22 at 9 AM 11/15/22   Claudia Liu MD   nicotine (NICODERM CQ) 7 MG/24HR Place 1 patch onto the skin every 24 hours  Patient not taking: No sig reported 11/11/22 11/11/23  Jane White MD   tiotropium (SPIRIVA RESPIMAT) 2.5 MCG/ACT AERS inhaler Inhale 2 puffs into the lungs daily  Patient not taking: No sig reported 11/9/22   Irene Pendleton MD   aspirin 81 MG EC tablet Take 81 mg by mouth daily    Ar Automatic Reconciliation   atorvastatin (LIPITOR) 80 MG tablet TAKE 1 TABLET EVERY NIGHT 8/26/20   Ar Automatic Reconciliation   traMADol (ULTRAM) 50 MG tablet Take 50 mg by mouth every 6 hours as needed. Ar Automatic Reconciliation       Pre-Sedation Documentation and Exam:   I have reviewed the patient's history and review of systems.     Mallampati Airway Assessment:  Mallampati Class II - (soft palate, fauces & uvula are visible)    Prior History of Anesthesia Complications:   none    ASA Classification:  Class 4 - A patient with an incapacitating systemic disease that is a constant threat to life    Sedation/ Anesthesia Plan:   intravenous sedation    Medications Planned:   midazolam (Versed) intravenously and fentanyl intravenously    Patient is an appropriate candidate for plan of sedation: yes    Electronically signed by Talisha Rojas MD on 3/8/2023 at 5:19 PM

## 2023-03-08 NOTE — CONSULTS
PULMONARY/CRITICAL CARE CONSULT NOTE           3/8/2023    Miguel Basilio [address]                        Date of Admission:  3/8/2023    The patient's chart is reviewed and the patient is discussed with the staff. Subjective:     Patient is a 75 y.o.  male seen and evaluated at the request of Dr. Lui Shelby. Patient with HTN/prior smoker (quit Nov 2022 with 50 pack years smoking history)/COPD with recent PE in 11/22 and was intermittently on Eliquis came in with SOB and noted to have B/L PE with hypoxemia. Now s/p PE lysis with b/l infusion catheters. Patient transferred to ICU with NRB with saturation at 89-91%. Awake and alert. Not in distress. He also reports takes Testerone injections. No pain. No leg swelling. He reports non-compliance with Eliquis was due to cost and was use baby ASA as outpatient instead.      Review of Systems: Comprehensive ROS negative except in HPI    Current Outpatient Medications   Medication Instructions    apixaban (ELIQUIS) 5 MG TABS tablet Take 10 mg every 12 hours for 7 days (last dose on 11-15-22 at 9 PM), then decrease to 5 mg every 12 hours on 11-16-22 at 9 AM    aspirin 81 mg, Oral, DAILY    atorvastatin (LIPITOR) 80 MG tablet TAKE 1 TABLET EVERY NIGHT    metoprolol succinate (TOPROL XL) 12.5 mg, Oral, 2 times daily    nicotine (NICODERM CQ) 7 MG/24HR 1 patch, TransDERmal, EVERY 24 HOURS    tiotropium (SPIRIVA RESPIMAT) 2.5 MCG/ACT AERS inhaler 2 puffs, Inhalation, DAILY    traMADol (ULTRAM) 50 mg, Oral, EVERY 6 HOURS PRN      Past Medical History:   Diagnosis Date    Arthritis     Asthma     Calculus of kidney     Hypercholesterolemia     Hypertension      Past Surgical History:   Procedure Laterality Date    HEENT Bilateral     cataracts    [facility]  11/8/2022    [facility] 11/8/2022 [facility]    ORTHOPEDIC SURGERY      rotator cuff ten years ago     Social History     Socioeconomic History    Marital status:  Spouse name: Not on file    Number of children: Not on file    Years of education: Not on file    Highest education level: Not on file   Occupational History    Not on file   Tobacco Use    Smoking status: Former     Types: Cigars     Quit date: 2022     Years since quittin.3     Passive exposure: Current    Smokeless tobacco: Never   Vaping Use    Vaping Use: Never used   Substance and Sexual Activity    Alcohol use: Yes     Alcohol/week: 6.0 standard drinks     Types: 6 Cans of beer per week    Drug use: No    Sexual activity: Not on file   Other Topics Concern    Not on file   Social History Narrative    Not on file     Social Determinants of Health     Financial Resource Strain: Low Risk     Difficulty of Paying Living Expenses: Not hard at all   Food Insecurity: No Food Insecurity    Worried About Running Out of Food in the Last Year: Never true    Ran Out of Food in the Last Year: Never true   Transportation Needs: Not on file   Physical Activity: Inactive    Days of Exercise per Week: 0 days    Minutes of Exercise per Session: 0 min   Stress: Not on file   Social Connections: Not on file   Intimate Partner Violence: Not on file   Housing Stability: Not on file     Family History   Problem Relation Age of Onset    Asthma Mother     Thyroid Disease Mother     COPD Mother     Heart Disease Father     Hypertension Father     Cancer Neg Hx     Deep Vein Thrombosis Neg Hx      Allergies   Allergen Reactions    Methocarbamol Hives    Ciprofloxacin Nausea And Vomiting     Objective:   Blood pressure (!) 115/94, pulse (!) 127, temperature 97.5 °F (36.4 °C), temperature source Temporal, resp. rate 22, height 5' 7\" (1.702 m), weight 230 lb (104.3 kg), SpO2 94 %.    Intake/Output Summary (Last 24 hours) at 3/8/2023 1844  Last data filed at 3/8/2023 1332  Gross per 24 hour   Intake 100 ml   Output --   Net 100 ml     PHYSICAL EXAM   Constitutional:  the patient is well developed and in no acute distress  EENMT:  Sclera clear, pupils equal, oral mucosa moist,   Respiratory: symmetric chest rise. Distant sounds. No wheezing  Cardiovascular:  RRR without M,G,R. There is + lower extremity edema of right leg vs left. Right leg is more firm around calf  Gastrointestinal: soft and non-tender; with positive bowel sounds. Musculoskeletal: warm without cyanosis. Normal muscle tone. See above regarding Right leg. Skin:  no jaundice or rashes, no wounds   Neurologic: symmetric strength, fluent speech  Psychiatric:  calm, appropriate, oriented x 4    Imaging: I performed an independent interpretation of the patient's images. CT Chest: noted b/l pe      Recent Labs     03/08/23  1306   WBC 14.9*   HGB 18.3*   HCT 56.6*         K 4.3      CO2 25   BUN 24*   CREATININE 1.40   MG 2.2   BILITOT 0.5   AST 23   ALT 28   ALKPHOS 76   TROPHS 287.1*   NTPROBNP 293*     ECHO: 11/07/22    TRANSTHORACIC ECHOCARDIOGRAM (TTE) COMPLETE (CONTRAST/BUBBLE/3D PRN) 11/08/2022  4:45 PM, 11/08/2022 12:00 AM (Final)    Interpretation Summary    Left Ventricle: Mildly reduced left ventricular systolic function with a visually estimated EF of 45 - 50%. Left ventricle size is normal. Normal wall thickness. Mild global hypokinesis present. Abnormal diastolic function. Right Ventricle: Right ventricle is mildly dilated. Mildly reduced systolic function. Aortic Valve: Tricuspid valve. Mild sclerosis of the aortic valve cusp. Mitral Valve: Mild regurgitation. Technical qualifiers: Color flow Doppler was performed and pulse wave and/or continuous wave Doppler was performed. Contrast used: Definity. Signed by: Liseth Brewer MD on 11/8/2022  4:45 PM, Signed by: Unknown Provider Result on 11/8/2022 12:00 AM    MICRO: No results for input(s): CULTURE in the last 72 hours.   Assessment and Plan:  (Medical Decision Making)   Principal Problem:    Acute bilateral pulmonary embolism ([address]  --getting TPA at this time. F/u with IR  --will need to be compliant with final drug regimine either xarelto or eliquis  --will need to stop using testosterone shots    Active Problems:    Acute respiratory failure with hypoxia (HCC)  --change to Airvo vs NRB    History of smoking (50 pack year) and ?COPD  --will add xopenex/atrovent and pulmicort for now. Pain in both lower extremities  Plan: noted aysmetrry of Right calf but does have evidence of varicose veins and prior trauma to left ankle. Will get US to r/o DVT as well  Sinus tachycardia  --secondary to PE         Chronic combined systolic and diastolic congestive heart failure ([address]  --per PMD    Essential hypertension  Plan: per PMD    Coronary artery disease involving native coronary artery of native heart without angina pectoris  Plan: per PMD      Prior    More than 50% of the time documented was spent in face-to-face contact with the patient and in the care of the patient on the floor/unit where the patient is located. Thank you very much for this referral.  We appreciate the opportunity to participate in this patient's care. Will follow along with above stated plan.     Love Rosas MD

## 2023-03-08 NOTE — OP NOTE
Department of Interventional Radiology  [phone]        Interventional Radiology Brief Procedure Note    Patient: Patrick Vasquez MRN: [id number]  SSN: [id number]    YOB: 1955  Age: 75 y.o.   Sex: male      Date of Procedure: 3/8/2023    Pre-Procedure Diagnosis: submassive PE    Post-Procedure Diagnosis: SAME    Procedure(s): PE lysis    Brief Description of Procedure: as above    Performed By: Cynthia Quezada MD     Assistants: None    Anesthesia:Moderate Sedation    Estimated Blood Loss: Less than 10ml    Specimens:  None    Implants:  bialteral infusion catheters    Findings: central thrombus    Complications: None    Recommendations: initiate tpa lysis     Follow Up: tomorrow morning    Signed By: Cynthia Quezada MD     March 8, 2023

## 2023-03-08 NOTE — ED TRIAGE NOTES
Pt reports feeling weakness to extremities but greater L side since yesterday with cramping to legs and lightheaded since this morning (-)cp (+)dyspnea w exertion   Pt started on blood thinner in November for pulmonary embolism. Denies home O2, 87% on RA upon arrival, placed on NC.    A&Ox4

## 2023-03-08 NOTE — H&P
Hospitalist History and Physical   Admit Date:  3/8/2023  1:04 PM   Name:  Home Adams   Age:  75 y.o. Sex:  male  :  1955   MRN:  [id number]   Room:  05/    Presenting Complaint: Dizziness and Extremity Weakness     Reason(s) for Admission: Bilateral pulmonary embolism ([facility] [I26.99]     History of Present Illness:   Home Adams is a 75 y.o. male with medical history of PAD, hypertension, CAD, hyperlipidemia, bilateral PE status post thrombectomy and RLE DVT on eliquis who presented with acute onset of dyspnea on exertion that started this AM. Also reporting B/l LE weakness with muscle cramps around his calves as well as LUE weakness w/o numbness or tingling. He is doing fairly well until this am when he started having sob. Denies CP. Reports compliance with his medications. Also admits to be still on testosterone supplementation. In the Emergency room, patient was tachycardic, tachypneic and saturating 87% on room air requiring 15 L O2 nasal cannula to maintain his saturation. Laboratory work-up is notable for procalcitonin of less than 0.05, proBNP of 293, troponin of 287, WBC of 14.9, hemoglobin 18.3. CT chest shows acute appearing occlusive bilateral pulmonary emboli with large clot burden and findings suggestive of right heart strain. Interventional radiology was consulted by ED and plan for thrombectomy. Assessment & Plan:   Acute respiratory failure with hypoxia due to bilateral pulmonary embolism , acute  Hx B/l Pulm Embolism s/p thrombectomy and  RLE DVT in 2022  CT imagine reviewed.    Hypoxic to 87% on RA  - heparin gtt  - hematology consult given new PE on eliquis  - US LE to r/o new dvts  - O2 supplement and wean as tolerated  - IR to take patient for thrombectomy    Sinus tachycardia due to hypoxia from PE  - on home metoprolol  - monitor    Chronic combined systolic and diastolic CHF  Echo 55/31 with EF 45-50% and abnormal diastolic function  BNP of 293 this admission but 4k in 11/22  - recheck Echo    LUE weakness  With PE while on Eliquis, patient is at risk for CVA. LUE is slightly weak on exam  - will get echo to look for PFO (did not see it mention in prior Echo)  - will consider MRI    CAD // HTN // HLD  - Asa, lipitor , metoprolol    Diet: NPO for now pending IR  VTE prophylaxis:  heparin gtt  Code status: FULL    Critical care time: 38 minutes    Patient is critically ill. Without intervention, there is a high probability of acute organ impairment or life-threatening deterioration in the patient's condition from: Acute respiratory failure with hypoxia due to bilateral pe    Critical care interventions: Heparin gtt    More than 50% of the time documented was spent in face-to-face contact with the patient and in the care of the patient on the floor/unit where the patient is located. Due to a high probability of clinically significant, life-threatening deterioration that could result in multiorgan failure,  this critically ill or injured patient required my highest level preparedness to intervene emergent. This critical care time included time spent at bedside performing patient interview and physical exam, time spent researching patient prior to interaction with patient, time spent discussing findings and treatment plan with patient and/or family, time spent discussing patient with consultants and colleagues, time spent reviewing pertinent laboratory and radiographic evaluations, time spent re-evaluating patient, or time spent discussing patient with nursing staff. The time listed is exclusive of any procedures which may have been performed.          Hospital Problems:  Principal Problem:    Bilateral pulmonary embolism (HCC)  Active Problems:    Sinus tachycardia    Acute pulmonary embolism, unspecified pulmonary embolism type, unspecified whether acute cor pulmonale present (HCC)    Chronic combined systolic and diastolic congestive heart failure ([facility]    LUE weakness    Acute respiratory failure with hypoxia (HCC)    PAD (peripheral artery disease) ([facility]    Essential hypertension    Coronary artery disease involving native coronary artery of native heart without angina pectoris  Resolved Problems:    * No resolved hospital problems. *       Past History:     Past Medical History:   Diagnosis Date    Arthritis     Asthma     Calculus of kidney     Hypercholesterolemia     Hypertension        Past Surgical History:   Procedure Laterality Date    HEENT Bilateral     cataracts    [facility]  2022    [facility] 2022 [facility]    ORTHOPEDIC SURGERY      rotator cuff ten years ago        Social History     Tobacco Use    Smoking status: Former     Types: Cigars     Quit date: 2022     Years since quittin.3     Passive exposure: Current    Smokeless tobacco: Never   Substance Use Topics    Alcohol use:  Yes     Alcohol/week: 6.0 standard drinks     Types: 6 Cans of beer per week      Social History     Substance and Sexual Activity   Drug Use No       Family History   Problem Relation Age of Onset    Asthma Mother     Thyroid Disease Mother     COPD Mother     Heart Disease Father     Hypertension Father     Cancer Neg Hx     Deep Vein Thrombosis Neg Hx         Immunization History   Administered Date(s) Administered    COVID-19, MODERNA Bivalent BOOSTER, (age 12y+), IM, 48 mcg/0.5 mL 2021, 2021    Influenza Virus Vaccine 2018, 09/10/2019    Influenza, FLUAD, (age 72 y+), Adjuvanted, 0.5mL 2022    Influenza, FLUARIX, FLULAVAL, FLUZONE (age 10 mo+) AND AFLURIA, (age 1 y+), PF, 0.5mL 2018, 09/10/2019     Allergies   Allergen Reactions    Methocarbamol Hives    Ciprofloxacin Nausea And Vomiting     Prior to Admit Medications:  Current Outpatient Medications   Medication Instructions    apixaban (ELIQUIS) 5 MG TABS tablet Take 10 mg every 12 hours for 7 days (last dose on 11-15-22 at 9 PM), then decrease to 5 mg every 12 hours on 11-16-22 at 9 AM    aspirin 81 mg, Oral, DAILY    atorvastatin (LIPITOR) 80 MG tablet TAKE 1 TABLET EVERY NIGHT    metoprolol succinate (TOPROL XL) 12.5 mg, Oral, 2 times daily    nicotine (NICODERM CQ) 7 MG/24HR 1 patch, TransDERmal, EVERY 24 HOURS    tiotropium (SPIRIVA RESPIMAT) 2.5 MCG/ACT AERS inhaler 2 puffs, Inhalation, DAILY    traMADol (ULTRAM) 50 mg, Oral, EVERY 6 HOURS PRN         Objective:   Patient Vitals for the past 24 hrs:   Temp Pulse Resp BP SpO2   03/08/23 1426 -- (!) 140 19 (!) 125/94 94 %   03/08/23 1345 -- (!) 139 (!) 34 -- 91 %   03/08/23 1314 -- -- -- -- (!) 89 %   03/08/23 1307 -- -- -- 106/70 --   03/08/23 1303 -- -- -- -- 93 %   03/08/23 1255 -- -- -- -- (!) 89 %   03/08/23 1251 -- -- -- -- 91 %   03/08/23 1249 -- -- -- -- (!) 89 %   03/08/23 1244 97.8 °F (36.6 °C) (!) 154 22 109/77 (!) 87 %       Oxygen Therapy  SpO2: 94 %  O2 Device: High flow nasal cannula  O2 Flow Rate (L/min): 15 L/min (Increased per ABG)    Estimated body mass index is 36.02 kg/m² as calculated from the following:    Height as of this encounter: 5' 7\" (1.702 m). Weight as of this encounter: 230 lb (104.3 kg). Intake/Output Summary (Last 24 hours) at 3/8/2023 1453  Last data filed at 3/8/2023 1332  Gross per 24 hour   Intake 100 ml   Output --   Net 100 ml         Physical Exam:    Blood pressure (!) 125/94, pulse (!) 140, temperature 97.8 °F (36.6 °C), temperature source Oral, resp. rate 19, height 5' 7\" (1.702 m), weight 230 lb (104.3 kg), SpO2 94 %. General:    Ill appearing    Head:  Normocephalic, atraumatic  Eyes:  Sclerae appear normal.  Pupils equally round. ENT:  Nares appear normal.  Moist oral mucosa  Neck:  No restricted ROM. Trachea midline   CV:   tachycardic. No m/r/g. No jugular venous distension. Lungs:   CTAB. No wheezing. tachypneic. Symmetric expansion. Abdomen:   Soft, nontender, nondistended. Extremities: No cyanosis or clubbing.   No edema, TTP to calves b/l  Skin:     No rashes and normal coloration. Warm and dry. Neuro:  CN II-XII grossly intact. Sensation intact. Psych:  Normal mood and affect.       I have personally reviewed labs and tests:  Recent Labs:  Recent Results (from the past 24 hour(s))   CBC with Auto Differential    Collection Time: 03/08/23  1:06 PM   Result Value Ref Range    WBC 14.9 (H) 4.3 - 11.1 K/uL    RBC 5.83 (H) 4.23 - 5.6 M/uL    Hemoglobin 18.3 (H) 13.6 - 17.2 g/dL    Hematocrit 56.6 (H) 41.1 - 50.3 %    MCV 97.1 82 - 102 FL    MCH 31.4 26.1 - 32.9 PG    MCHC 32.3 31.4 - 35.0 g/dL    RDW 13.9 11.9 - 14.6 %    Platelets 579 909 - 351 K/uL    MPV 10.3 9.4 - 12.3 FL    nRBC 0.00 0.0 - 0.2 K/uL    Differential Type AUTOMATED      Seg Neutrophils 78 43 - 78 %    Lymphocytes 11 (L) 13 - 44 %    Monocytes 7 4.0 - 12.0 %    Eosinophils % 1 0.5 - 7.8 %    Basophils 1 0.0 - 2.0 %    Immature Granulocytes 2 0.0 - 5.0 %    Segs Absolute 11.9 (H) 1.7 - 8.2 K/UL    Absolute Lymph # 1.6 0.5 - 4.6 K/UL    Absolute Mono # 1.0 0.1 - 1.3 K/UL    Absolute Eos # 0.1 0.0 - 0.8 K/UL    Basophils Absolute 0.1 0.0 - 0.2 K/UL    Absolute Immature Granulocyte 0.2 0.0 - 0.5 K/UL   Comprehensive Metabolic Panel    Collection Time: 03/08/23  1:06 PM   Result Value Ref Range    Sodium 135 133 - 143 mmol/L    Potassium 4.3 3.5 - 5.1 mmol/L    Chloride 104 101 - 110 mmol/L    CO2 25 21 - 32 mmol/L    Anion Gap 6 2 - 11 mmol/L    Glucose 132 (H) 65 - 100 mg/dL    BUN 24 (H) 8 - 23 MG/DL    Creatinine 1.40 0.8 - 1.5 MG/DL    Est, Glom Filt Rate 55 (L) >60 ml/min/1.73m2    Calcium 11.1 (H) 8.3 - 10.4 MG/DL    Total Bilirubin 0.5 0.2 - 1.1 MG/DL    ALT 28 12 - 65 U/L    AST 23 15 - 37 U/L    Alk Phosphatase 76 50 - 136 U/L    Total Protein 8.5 (H) 6.3 - 8.2 g/dL    Albumin 3.6 3.2 - 4.6 g/dL    Globulin 4.9 (H) 2.8 - 4.5 g/dL    Albumin/Globulin Ratio 0.7 0.4 - 1.6     Magnesium    Collection Time: 03/08/23  1:06 PM   Result Value Ref Range    Magnesium 2. 2 1.8 - 2.4 mg/dL   Troponin    Collection Time: 03/08/23  1:06 PM   Result Value Ref Range    Troponin, High Sensitivity 287.1 (HH) 0 - 14 pg/mL   Procalcitonin    Collection Time: 03/08/23  1:06 PM   Result Value Ref Range    Procalcitonin <0.05 0.00 - 0.49 ng/mL   Brain Natriuretic Peptide    Collection Time: 03/08/23  1:06 PM   Result Value Ref Range    NT Pro- (H) 5 - 125 PG/ML   Lactic Acid    Collection Time: 03/08/23  1:10 PM   Result Value Ref Range    Lactic Acid, Plasma 1.8 0.4 - 2.0 MMOL/L   EKG 12 Lead    Collection Time: 03/08/23  1:18 PM   Result Value Ref Range    Ventricular Rate 144 BPM    Atrial Rate 146 BPM    P-R Interval 119 ms    QRS Duration 88 ms    Q-T Interval 275 ms    QTc Calculation (Bazett) 426 ms    P Axis 69 degrees    R Axis 77 degrees    T Axis -16 degrees    Diagnosis Sinus tachycardia    Arterial Blood Gas, POC    Collection Time: 03/08/23  1:37 PM   Result Value Ref Range    DEVICE NASAL CANNULA      pH, Arterial, POC 7.45 7.35 - 7.45      pCO2, Arterial, POC 33.7 (L) 35 - 45 MMHG    pO2, Arterial, POC 58 (L) 75 - 100 MMHG    HCO3, Mixed 23.5 22 - 26 MMOL/L    SO2c, Arterial, POC 91.3 (L) 95 - 98 %    Base Excess 0.4 mmol/L    POC Kushal's Test Positive      Site RIGHT RADIAL      Specimen type: ARTERIAL      Performed by: Amber     POC TCO2 22 13 - 23 MMOL/L    FIO2 10         I have personally reviewed imaging studies:  CT CHEST PULMONARY EMBOLISM W CONTRAST    Result Date: 3/8/2023  EXAM: CT Chest with contrast. INDICATION: hx pe and hypoxia Comparison: Chest radiograph, 11/11/2022. CT chest, 11/7/2022 Multiple axial images were obtained through the chest during intravenous infusion of 100mL of Isovue 370. Coronal and sagittal reformats were performed. Radiation dose reduction techniques were used for this study:   All CT scans performed at this facility use one or all of the following: Automated exposure control, adjustment of the mA and/or kVp according to patient's size, iterative reconstruction. FINDINGS: Examination is diagnostic through the subsegmental level. There are acute bilateral occlusive pulmonary emboli extending from the distal main pulmonary arteries into the lobar, segmental, and subsegmental branches throughout all 5 lobes. The appearance is very similar to November 2022, though the size and occlusive appearance suggests this is a new acute embolic event. The patient is status post thrombectomy in November 2022 further supporting acute embolic event. The great vessels are normal in caliber with atherosclerotic calcifications in the aorta, branch vessels, and coronary arteries. LUNGS/PLEURA: The central airways are patent. No focal lung consolidation. No pleural effusion or pneumothorax. MEDIASTINUM: The heart is normal in size, though the right ventricle is dilated with flattening of the interventricular septum concerning for right heart strain. No pericardial effusion. No mediastinal or hilar lymphadenopathy. The is less thyroid is normal.  Thoracic esophagus is unremarkable. BONES/SUBCUTANEOUS TISSUE: No acute or aggressive osseous abnormality. Regional soft tissues are unremarkable. No axillary lymphadenopathy UPPER ABDOMEN: Nonobstructing calculus in the left kidney. Right renal cyst measuring 2.5 cm. Acute appearing occlusive bilateral pulmonary emboli with large clot burden and findings suggestive of right heart strain. [facility] The critical results contained in this report were communicated to Dr. Waleska Silver by Dr. Obdulia Lugo over the phone at 3/8/2023 1:41 PM. Critical results were communicated as outlined in Section II.C.2.a.i of the ACR Practice Guideline for Communication of Diagnostic Imaging Findings.        Echocardiogram:  11/07/22    TRANSTHORACIC ECHOCARDIOGRAM (TTE) COMPLETE (CONTRAST/BUBBLE/3D PRN) 11/08/2022  4:45 PM, 11/08/2022 12:00 AM (Final)    Interpretation Summary    Left Ventricle: Mildly reduced left ventricular systolic function with a visually estimated EF of 45 - 50%. Left ventricle size is normal. Normal wall thickness. Mild global hypokinesis present. Abnormal diastolic function.    Right Ventricle: Right ventricle is mildly dilated. Mildly reduced systolic function.    Aortic Valve: Tricuspid valve. Mild sclerosis of the aortic valve cusp.    Mitral Valve: Mild regurgitation.    Technical qualifiers: Color flow Doppler was performed and pulse wave and/or continuous wave Doppler was performed.    Contrast used: Definity.    Signed by: David Wong MD on 11/8/2022  4:45 PM, Signed by: Unknown Provider Result on 11/8/2022 12:00 AM        Orders Placed This Encounter   Medications    iopamidol (ISOVUE-370) 76 % injection 100 mL    0.9 % sodium chloride bolus    sodium chloride flush 0.9 % injection 10 mL    0.9 % sodium chloride bolus    DISCONTD: heparin (porcine) injection 8,340 Units    DISCONTD: heparin (porcine) injection 8,340 Units    DISCONTD: heparin (porcine) injection 4,170 Units    DISCONTD: heparin 25,000 units in dextrose 5% 250 mL (premix) infusion    heparin (porcine) injection 8,340 Units    heparin (porcine) injection 8,340 Units    heparin (porcine) injection 4,170 Units    heparin 25,000 units in dextrose 5% 250 mL (premix) infusion         Signed:  Dk Dumont MD    Part of this note may have been written by using a voice dictation software.  The note has been proof read but may still contain some grammatical/other typographical errors.

## 2023-03-08 NOTE — ED PROVIDER NOTES
Emergency Department Provider Note                   PCP:                Ho Doran MD               Age: 75 y.o. Sex: male     DISPOSITION Decision To Admit 03/08/2023 02:00:05 PM       ICD-10-CM    1. Other acute pulmonary embolism with acute cor pulmonale (HCC)  I26.09           MEDICAL DECISION MAKING  Complexity of Problems Addressed:  1 or more acute illnesses that pose a threat to life or bodily function. Data Reviewed and Analyzed:  Category 1:   I reviewed records from an external source: ED records from outside this hospital.  I reviewed records from an external source: provider visit notes from PCP. I reviewed records from an external source: provider visit notes from outside specialist.  I reviewed records from an external source: previous old EKG's reviewed. I reviewed records from an external source: previous lab results from outside ED. I reviewed records from an external source: previous imaging studies from outside ED. I ordered each unique test.  I reviewed the results of each unique test.        Category 2:   ED EKG was independently interpreted in the absence of a cardiologist.  Rate: 151  EKG Interpretation: EKG Interpretation: sinus rhythm  ST Segments: Nonspecific ST segments - NO STEMI    I independently ordered and reviewed the EKG. I independently ordered and reviewed the CT Scan. Acute PE    Category 3: Discussion of management or test interpretation. Acute exacerbation asthma, COPD, CHF, COVID-19, influenza    Bronchitis, aspiration pneumonia, pneumonia,    PE, rib fracture, rib dysfunction, pleurisy, pneumothorax, aspiration of foreign body      ED Course as of 03/08/23 1402   Wed Mar 08, 2023   1350 CT CHEST PULMONARY EMBOLISM W CONTRAST  IMPRESSION:  Acute appearing occlusive bilateral pulmonary emboli with large clot burden and  findings suggestive of right heart strain.  [JJ]   7534 I talked to the noninterventional radiologist and the interventional radiologist Dr. Jez Deleon. Dr. Jez Deleon is planning on an emergent procedure secondary to the occlusive clot burden. He was updated regarding the patient's last oral intake. Patient was told not to eat or drink anything. I updated the patient and his family about the findings in the emergency department and need for admission along with the heparin bolus and drip. [AZ]   1972 Hospitalist was consulted to the [facility] system regarding the patient's need for admission. [KH]      ED Course User Index  [KH] Randi Perez DO       Risk of Complications and/or Morbidity of Patient Management:  Drug therapy given requiring intensive monitoring for toxicity, Patient was admitted I have communication with admitting physician, and Discussion with external consultants       Andi Almaraz is a 75 y.o. male who presents to the Emergency Department with chief complaint of    Chief Complaint   Patient presents with    Dizziness    Extremity Weakness      Patient is a 79-year-old male presenting to the emergency department complaining of feeling weak and short of breath. The patient states he was diagnosed with a pulmonary embolism back in November and placed on a blood thinner and has been taking this medicine as prescribed. Patient denies being around anyone has been sick recently. He denies any fevers or chills. The patient states that he has some left arm weakness in particular but denies any episodes of dropping anything or limited mobility. The patient denies any syncopal events but has had some lightheadedness. The patient denies any abdominal pain nausea or vomiting. Review of Systems   Constitutional:  Positive for activity change. Respiratory:  Positive for shortness of breath. Cardiovascular:  Positive for palpitations. Neurological:  Positive for weakness. All other systems reviewed and are negative. Vitals signs and nursing note reviewed.    Patient Vitals for the past 4 hrs:   Temp Pulse Resp BP SpO2   03/08/23 1345 -- (!) 139 (!) 34 -- 91 %   03/08/23 1314 -- -- -- -- (!) 89 %   03/08/23 1307 -- -- -- 106/70 --   03/08/23 1303 -- -- -- -- 93 %   03/08/23 1255 -- -- -- -- (!) 89 %   03/08/23 1251 -- -- -- -- 91 %   03/08/23 1249 -- -- -- -- (!) 89 %   03/08/23 1244 97.8 °F (36.6 °C) (!) 154 22 109/77 (!) 87 %          Physical Exam     GENERAL:The patient has Body mass index is 36.02 kg/m². Well-hydrated. Acute respiratory distress  VITAL SIGNS: Heart rate, blood pressure, respiratory rate reviewed as recorded in  nurse's notes  EYES: Pupils reactive. Extraocular motion intact. No conjunctival redness or drainage. NOSE: No nasal drainage or epistaxis. MOUTH/THROAT: Pharynx clear; airway patent. NECK: Supple, no meningeal signs. Trachea midline. No masses or thyromegaly. LUNGS: accessory muscle use with tachypnea. Patient does not have any wheezing noted. CHEST: No deformity  CARDIOVASCULAR: Sinus tachycardia no gallops or rubs noted. ABDOMEN: Soft without tenderness. No palpable masses or organomegaly. No  peritoneal signs. No rigidity. EXTREMITIES: No clubbing or cyanosis. No joint swelling. Normal muscle tone. No  restricted range of motion appreciated. NEUROLOGIC: Sensation is grossly intact. Cranial nerve exam reveals face is  symmetrical, tongue is midline speech is clear. SKIN: No rash or petechiae. Good skin turgor palpated. PSYCHIATRIC: Alert and oriented. Appropriate behavior and judgment. Critical Care  Performed by: Aldo Barragan DO  Authorized by: Aldo Barragan DO     Comments:      Critical care time: 99 minutes of critical care time was performed in the emergency department. This was separate from any other procedures listed during the patients emergency department course. The failure to initiate these interventions on an urgent basis would likely have resulted in sudden, clinically significant or life-threatening deterioration in the patients condition.       ED Course as of 03/08/23 1402   Wed Mar 08, 2023   1350 CT CHEST PULMONARY EMBOLISM W CONTRAST  IMPRESSION:  Acute appearing occlusive bilateral pulmonary emboli with large clot burden and  findings suggestive of right heart strain. [IV]   5509 I talked to the noninterventional radiologist and the interventional radiologist Dr. Chata Johnson. Dr. Chata Johnson is planning on an emergent procedure secondary to the occlusive clot burden. He was updated regarding the patient's last oral intake. Patient was told not to eat or drink anything. I updated the patient and his family about the findings in the emergency department and need for admission along with the heparin bolus and drip. [TS]   1633 Hospitalist was consulted to the [address] [place] [address] regarding the patient's need for admission.  [KH]      ED Course User Index  [KH] Scooby Parmar,         Orders Placed This Encounter   Procedures    Critical Care    Culture, Blood 1    Culture, Blood 1    Respiratory Panel, Molecular, with COVID-19 (Restricted: peds pts or suitable admitted adults)    CT CHEST PULMONARY EMBOLISM W CONTRAST    CBC with Auto Differential    Comprehensive Metabolic Panel    Magnesium    Brain Natriuretic Peptide    Troponin    Lactic Acid    Lactic Acid    Procalcitonin    Brain Natriuretic Peptide    Blood Gas, Arterial    CBC    APTT    Protime-INR    Anti-Xa, Unfractionated Heparin    CBC    APTT    Anti-Xa, Unfractionated Heparin    Cardiac Monitor - ED Only    Continuous Pulse Oximetry    Arterial Blood Gas, POC    EKG 12 Lead    Saline lock IV        Medications   0.9 % sodium chloride bolus (has no administration in time range)   heparin (porcine) injection 8,340 Units (has no administration in time range)   heparin (porcine) injection 4,170 Units (has no administration in time range)   heparin 25,000 units in dextrose 5% 250 mL (premix) infusion (18 Units/kg/hr × 104.3 kg IntraVENous New Bag 3/8/23 1358)   iopamidol (ISOVUE-370) 76 % injection 100 mL (100 mLs IntraVENous Given 3/8/23 1330)   0.9 % sodium chloride bolus (100 mLs IntraVENous New Bag 3/8/23 1331)   sodium chloride flush 0.9 % injection 10 mL (10 mLs IntraVENous Given 3/8/23 1331)   heparin (porcine) injection 8,340 Units (8,340 Units IntraVENous Given 3/8/23 1359)       New Prescriptions    No medications on file        Past Medical History:   Diagnosis Date    Arthritis     Asthma     Calculus of kidney     Hypercholesterolemia     Hypertension         Past Surgical History:   Procedure Laterality Date    HEENT Bilateral     cataracts    [facility]  2022    [facility] 2022 [facility]    ORTHOPEDIC SURGERY      rotator cuff ten years ago        Family History   Problem Relation Age of Onset    Asthma Mother     Thyroid Disease Mother     COPD Mother     Heart Disease Father     Hypertension Father     Cancer Neg Hx     Deep Vein Thrombosis Neg Hx         Social History     Socioeconomic History    Marital status:      Spouse name: None    Number of children: None    Years of education: None    Highest education level: None   Tobacco Use    Smoking status: Former     Types: Cigars     Quit date: 2022     Years since quittin.3     Passive exposure: Current    Smokeless tobacco: Never   Vaping Use    Vaping Use: Never used   Substance and Sexual Activity    Alcohol use:  Yes     Alcohol/week: 6.0 standard drinks     Types: 6 Cans of beer per week    Drug use: No     Social Determinants of Health     Financial Resource Strain: Low Risk     Difficulty of Paying Living Expenses: Not hard at all   Food Insecurity: No Food Insecurity    Worried About Running Out of Food in the Last Year: Never true    Ran Out of Food in the Last Year: Never true   Physical Activity: Inactive    Days of Exercise per Week: 0 days    Minutes of Exercise per Session: 0 min        Allergies: Methocarbamol and Ciprofloxacin    Previous Medications    APIXABAN (ELIQUIS) 5 MG TABS TABLET    Take 10 mg every 12 hours for 7 days (last dose on 11-15-22 at 9 PM), then decrease to 5 mg every 12 hours on 11-16-22 at 9 AM    ASPIRIN 81 MG EC TABLET    Take 81 mg by mouth daily    ATORVASTATIN (LIPITOR) 80 MG TABLET    TAKE 1 TABLET EVERY NIGHT    METOPROLOL SUCCINATE (TOPROL XL) 25 MG EXTENDED RELEASE TABLET    Take 0.5 tablets by mouth in the morning and at bedtime    NICOTINE (NICODERM CQ) 7 MG/24HR    Place 1 patch onto the skin every 24 hours    TIOTROPIUM (SPIRIVA RESPIMAT) 2.5 MCG/ACT AERS INHALER    Inhale 2 puffs into the lungs daily    TRAMADOL (ULTRAM) 50 MG TABLET    Take 50 mg by mouth every 6 hours as needed. Results for orders placed or performed during the hospital encounter of 03/08/23   CT CHEST PULMONARY EMBOLISM W CONTRAST    Narrative    EXAM: CT Chest with contrast.  INDICATION: hx pe and hypoxia  Comparison: Chest radiograph, 11/11/2022. CT chest, 11/7/2022    Multiple axial images were obtained through the chest during intravenous  infusion of 100mL of Isovue 370. Coronal and sagittal reformats were performed. Radiation dose reduction techniques were used for this study: All CT scans  performed at this facility use one or all of the following: Automated exposure  control, adjustment of the mA and/or kVp according to patient's size, iterative  reconstruction. FINDINGS:  Examination is diagnostic through the subsegmental level. There are acute  bilateral occlusive pulmonary emboli extending from the distal main pulmonary  arteries into the lobar, segmental, and subsegmental branches throughout all 5  lobes. The appearance is very similar to November 2022, though the size and  occlusive appearance suggests this is a new acute embolic event. The patient is  status post thrombectomy in November 2022 further supporting acute embolic  event.       The great vessels are normal in caliber with atherosclerotic calcifications in  the aorta, branch vessels, and coronary arteries. LUNGS/PLEURA: The central airways are patent. No focal lung consolidation. No  pleural effusion or pneumothorax. MEDIASTINUM: The heart is normal in size, though the right ventricle is dilated  with flattening of the interventricular septum concerning for right heart  strain. No pericardial effusion. No mediastinal or hilar lymphadenopathy. The  is less thyroid is normal.  Thoracic esophagus is unremarkable. BONES/SUBCUTANEOUS TISSUE: No acute or aggressive osseous abnormality. Regional  soft tissues are unremarkable. No axillary lymphadenopathy    UPPER ABDOMEN: Nonobstructing calculus in the left kidney. Right renal cyst  measuring 2.5 cm. Impression    Acute appearing occlusive bilateral pulmonary emboli with large clot burden and  findings suggestive of right heart strain. [facility]  The critical results contained in this report were communicated to Dr. Maegan Washburn by  Dr. Betty Fox over the phone at 3/8/2023 1:41 PM. Critical results were  communicated as outlined in Section II.C.2.a.i of the ACR Practice Guideline for  Communication of Diagnostic Imaging Findings.        CBC with Auto Differential   Result Value Ref Range    WBC 14.9 (H) 4.3 - 11.1 K/uL    RBC 5.83 (H) 4.23 - 5.6 M/uL    Hemoglobin 18.3 (H) 13.6 - 17.2 g/dL    Hematocrit 56.6 (H) 41.1 - 50.3 %    MCV 97.1 82 - 102 FL    MCH 31.4 26.1 - 32.9 PG    MCHC 32.3 31.4 - 35.0 g/dL    RDW 13.9 11.9 - 14.6 %    Platelets 155 969 - 610 K/uL    MPV 10.3 9.4 - 12.3 FL    nRBC 0.00 0.0 - 0.2 K/uL    Differential Type AUTOMATED      Seg Neutrophils 78 43 - 78 %    Lymphocytes 11 (L) 13 - 44 %    Monocytes 7 4.0 - 12.0 %    Eosinophils % 1 0.5 - 7.8 %    Basophils 1 0.0 - 2.0 %    Immature Granulocytes 2 0.0 - 5.0 %    Segs Absolute 11.9 (H) 1.7 - 8.2 K/UL    Absolute Lymph # 1.6 0.5 - 4.6 K/UL    Absolute Mono # 1.0 0.1 - 1.3 K/UL    Absolute Eos # 0.1 0.0 - 0.8 K/UL    Basophils Absolute 0.1 0.0 - 0.2 K/UL    Absolute Immature Granulocyte 0.2 0.0 - 0.5 K/UL   Arterial Blood Gas, POC   Result Value Ref Range    DEVICE NASAL CANNULA      pH, Arterial, POC 7.45 7.35 - 7.45      pCO2, Arterial, POC 33.7 (L) 35 - 45 MMHG    pO2, Arterial, POC 58 (L) 75 - 100 MMHG    HCO3, Mixed 23.5 22 - 26 MMOL/L    SO2c, Arterial, POC 91.3 (L) 95 - 98 %    Base Excess 0.4 mmol/L    POC Kushal's Test Positive      Site RIGHT RADIAL      Specimen type: ARTERIAL      Performed by: Amber     POC TCO2 22 13 - 23 MMOL/L    FIO2 10     EKG 12 Lead   Result Value Ref Range    Ventricular Rate 144 BPM    Atrial Rate 146 BPM    P-R Interval 119 ms    QRS Duration 88 ms    Q-T Interval 275 ms    QTc Calculation (Bazett) 426 ms    P Axis 69 degrees    R Axis 77 degrees    T Axis -16 degrees    Diagnosis Sinus tachycardia         CT CHEST PULMONARY EMBOLISM W CONTRAST   Final Result   Acute appearing occlusive bilateral pulmonary emboli with large clot burden and   findings suggestive of right heart strain. [facility]   The critical results contained in this report were communicated to Dr. Sayra Hernandez by   Dr. Indra Tong over the phone at 3/8/2023 1:41 PM. Critical results were   communicated as outlined in Section II.C.2.a.i of the ACR Practice Guideline for   Communication of Diagnostic Imaging Findings. Voice dictation software was used during the making of this note. This software is not perfect and grammatical and other typographical errors may be present. This note has not been completely proofread for errors.        Francie Briscoe,   03/08/23 [id number]

## 2023-03-08 NOTE — ED NOTES
TRANSFER - OUT REPORT:    Verbal report given to [employer] on Heather Garcia  being transferred to [address] [place] [address] for transfer of care. Report consisted of patient's Situation, Background, Assessment and   Recommendations(SBAR). Information from the following report(s) Nurse Handoff Report and ED SBAR was reviewed with the receiving nurse. Lanagan Assessment: No data recorded  Lines:   Peripheral IV 03/08/23 Right Forearm (Active)   Site Assessment Clean, dry & intact 03/08/23 1302   Line Status Blood return noted; Flushed 03/08/23 1302   Phlebitis Assessment No symptoms 03/08/23 1302   Infiltration Assessment 0 03/08/23 1302        Opportunity for questions and clarification was provided.       Patient transported with:  Transport            Orly Ac RN  03/08/23 7007

## 2023-03-09 ENCOUNTER — APPOINTMENT (OUTPATIENT)
Dept: NON INVASIVE DIAGNOSTICS | Age: 68
DRG: 270 | End: 2023-03-09
Payer: MEDICARE

## 2023-03-09 ENCOUNTER — APPOINTMENT (OUTPATIENT)
Dept: ULTRASOUND IMAGING | Age: 68
DRG: 270 | End: 2023-03-09
Payer: MEDICARE

## 2023-03-09 ENCOUNTER — ANESTHESIA EVENT (OUTPATIENT)
Dept: INTERVENTIONAL RADIOLOGY/VASCULAR | Age: 68
End: 2023-03-09
Payer: MEDICARE

## 2023-03-09 PROBLEM — M79.89 LEG SWELLING: Status: ACTIVE | Noted: 2023-03-09

## 2023-03-09 PROBLEM — I26.99 PULMONARY EMBOLUS (HCC): Status: ACTIVE | Noted: 2023-03-09

## 2023-03-09 PROBLEM — T38.7X5A TESTOSTERONE ADVERSE REACTION: Status: ACTIVE | Noted: 2023-03-09

## 2023-03-09 PROBLEM — Z91.199 NON-COMPLIANCE: Status: ACTIVE | Noted: 2023-03-09

## 2023-03-09 LAB
APTT PPP: 35.7 SEC (ref 24.5–34.2)
APTT PPP: 37.1 SEC (ref 24.5–34.2)
ECHO AO ASC DIAM: 3.6 CM
ECHO AO ASCENDING AORTA INDEX: 1.67 CM/M2
ECHO AO ROOT DIAM: 3.4 CM
ECHO AO ROOT INDEX: 1.58 CM/M2
ECHO AV AREA PEAK VELOCITY: 2.5 CM2
ECHO AV AREA VTI: 2.5 CM2
ECHO AV AREA/BSA PEAK VELOCITY: 1.2 CM2/M2
ECHO AV AREA/BSA VTI: 1.2 CM2/M2
ECHO AV MEAN GRADIENT: 3 MMHG
ECHO AV MEAN VELOCITY: 0.8 M/S
ECHO AV PEAK GRADIENT: 6 MMHG
ECHO AV PEAK VELOCITY: 1.2 M/S
ECHO AV VELOCITY RATIO: 0.75
ECHO AV VTI: 18 CM
ECHO BSA: 2.22 M2
ECHO EST RA PRESSURE: 3 MMHG
ECHO IVC PROX: 1.9 CM
ECHO LA AREA 2C: 15.7 CM2
ECHO LA AREA 4C: 15.4 CM2
ECHO LA MAJOR AXIS: 5.4 CM
ECHO LA MINOR AXIS: 5.3 CM
ECHO LA VOL 2C: 38 ML (ref 18–58)
ECHO LA VOL 4C: 34 ML (ref 18–58)
ECHO LA VOL BP: 36 ML (ref 18–58)
ECHO LA VOL/BSA BIPLANE: 17 ML/M2 (ref 16–34)
ECHO LA VOLUME INDEX A2C: 18 ML/M2 (ref 16–34)
ECHO LA VOLUME INDEX A4C: 16 ML/M2 (ref 16–34)
ECHO LV E' LATERAL VELOCITY: 7 CM/S
ECHO LV E' SEPTAL VELOCITY: 8 CM/S
ECHO LV EDV A2C: 77 ML
ECHO LV EDV A4C: 73 ML
ECHO LV EDV INDEX A4C: 34 ML/M2
ECHO LV EDV NDEX A2C: 36 ML/M2
ECHO LV EJECTION FRACTION A2C: 46 %
ECHO LV EJECTION FRACTION A4C: 47 %
ECHO LV EJECTION FRACTION BIPLANE: 45 % (ref 55–100)
ECHO LV ESV A2C: 41 ML
ECHO LV ESV A4C: 39 ML
ECHO LV ESV INDEX A2C: 19 ML/M2
ECHO LV ESV INDEX A4C: 18 ML/M2
ECHO LV FRACTIONAL SHORTENING: 20 % (ref 28–44)
ECHO LV INTERNAL DIMENSION DIASTOLE INDEX: 2.14 CM/M2
ECHO LV INTERNAL DIMENSION DIASTOLIC: 4.6 CM (ref 4.2–5.9)
ECHO LV INTERNAL DIMENSION SYSTOLIC INDEX: 1.72 CM/M2
ECHO LV INTERNAL DIMENSION SYSTOLIC: 3.7 CM
ECHO LV IVSD: 0.9 CM (ref 0.6–1)
ECHO LV MASS 2D: 137.7 G (ref 88–224)
ECHO LV MASS INDEX 2D: 64.1 G/M2 (ref 49–115)
ECHO LV POSTERIOR WALL DIASTOLIC: 0.9 CM (ref 0.6–1)
ECHO LV RELATIVE WALL THICKNESS RATIO: 0.39
ECHO LVOT AREA: 3.5 CM2
ECHO LVOT AV VTI INDEX: 0.72
ECHO LVOT DIAM: 2.1 CM
ECHO LVOT MEAN GRADIENT: 1 MMHG
ECHO LVOT PEAK GRADIENT: 3 MMHG
ECHO LVOT PEAK VELOCITY: 0.9 M/S
ECHO LVOT STROKE VOLUME INDEX: 20.9 ML/M2
ECHO LVOT SV: 45 ML
ECHO LVOT VTI: 13 CM
ECHO MV A VELOCITY: 1.27 M/S
ECHO MV AREA VTI: 2.1 CM2
ECHO MV E DECELERATION TIME (DT): 145 MS
ECHO MV E VELOCITY: 0.74 M/S
ECHO MV E/A RATIO: 0.58
ECHO MV E/E' LATERAL: 10.57
ECHO MV E/E' RATIO (AVERAGED): 9.91
ECHO MV E/E' SEPTAL: 9.25
ECHO MV LVOT VTI INDEX: 1.62
ECHO MV MAX VELOCITY: 1.3 M/S
ECHO MV MEAN GRADIENT: 4 MMHG
ECHO MV MEAN VELOCITY: 0.9 M/S
ECHO MV PEAK GRADIENT: 7 MMHG
ECHO MV VTI: 21.1 CM
ECHO PV ACCELERATION TIME (AT): 102 MS
ECHO PV MAX VELOCITY: 0.9 M/S
ECHO PV PEAK GRADIENT: 3 MMHG
ECHO RIGHT VENTRICULAR SYSTOLIC PRESSURE (RVSP): 35 MMHG
ECHO RV BASAL DIMENSION: 4.2 CM
ECHO RV FREE WALL PEAK S': 8 CM/S
ECHO RV TAPSE: 1.9 CM (ref 1.7–?)
ECHO TV REGURGITANT MAX VELOCITY: 2.85 M/S
ECHO TV REGURGITANT PEAK GRADIENT: 32 MMHG
ERYTHROCYTE [DISTWIDTH] IN BLOOD BY AUTOMATED COUNT: 13.8 % (ref 11.9–14.6)
ERYTHROCYTE [DISTWIDTH] IN BLOOD BY AUTOMATED COUNT: 14 % (ref 11.9–14.6)
ERYTHROCYTE [DISTWIDTH] IN BLOOD BY AUTOMATED COUNT: 14 % (ref 11.9–14.6)
FIBRINOGEN PPP-MCNC: 357 MG/DL (ref 190–501)
FIBRINOGEN PPP-MCNC: 409 MG/DL (ref 190–501)
HCT VFR BLD AUTO: 43.7 % (ref 41.1–50.3)
HCT VFR BLD AUTO: 44.2 % (ref 41.1–50.3)
HCT VFR BLD AUTO: 45 % (ref 41.1–50.3)
HGB BLD-MCNC: 13.8 G/DL (ref 13.6–17.2)
HGB BLD-MCNC: 13.9 G/DL (ref 13.6–17.2)
HGB BLD-MCNC: 14.5 G/DL (ref 13.6–17.2)
INR PPP: 1.1
LV EF: 48 %
LVEF MODALITY: ABNORMAL
MCH RBC QN AUTO: 31 PG (ref 26.1–32.9)
MCH RBC QN AUTO: 31.5 PG (ref 26.1–32.9)
MCH RBC QN AUTO: 31.7 PG (ref 26.1–32.9)
MCHC RBC AUTO-ENTMCNC: 31.2 G/DL (ref 31.4–35)
MCHC RBC AUTO-ENTMCNC: 31.8 G/DL (ref 31.4–35)
MCHC RBC AUTO-ENTMCNC: 32.2 G/DL (ref 31.4–35)
MCV RBC AUTO: 97.8 FL (ref 82–102)
MCV RBC AUTO: 99.3 FL (ref 82–102)
MCV RBC AUTO: 99.5 FL (ref 82–102)
NRBC # BLD: 0 K/UL (ref 0–0.2)
PLATELET # BLD AUTO: 104 K/UL (ref 150–450)
PLATELET # BLD AUTO: 118 K/UL (ref 150–450)
PLATELET # BLD AUTO: 98 K/UL (ref 150–450)
PMV BLD AUTO: 10.7 FL (ref 9.4–12.3)
PMV BLD AUTO: 10.8 FL (ref 9.4–12.3)
PMV BLD AUTO: 10.9 FL (ref 9.4–12.3)
PROTHROMBIN TIME: 14.7 SEC (ref 12.6–14.3)
RBC # BLD AUTO: 4.39 M/UL (ref 4.23–5.6)
RBC # BLD AUTO: 4.45 M/UL (ref 4.23–5.6)
RBC # BLD AUTO: 4.6 M/UL (ref 4.23–5.6)
UFH PPP CHRO-ACNC: <0.1 IU/ML (ref 0.3–0.7)
UFH PPP CHRO-ACNC: <0.1 IU/ML (ref 0.3–0.7)
WBC # BLD AUTO: 11.6 K/UL (ref 4.3–11.1)
WBC # BLD AUTO: 12.1 K/UL (ref 4.3–11.1)
WBC # BLD AUTO: 12.2 K/UL (ref 4.3–11.1)

## 2023-03-09 PROCEDURE — 85730 THROMBOPLASTIN TIME PARTIAL: CPT

## 2023-03-09 PROCEDURE — 6370000000 HC RX 637 (ALT 250 FOR IP): Performed by: INTERNAL MEDICINE

## 2023-03-09 PROCEDURE — 93306 TTE W/DOPPLER COMPLETE: CPT | Performed by: INTERNAL MEDICINE

## 2023-03-09 PROCEDURE — 6360000002 HC RX W HCPCS: Performed by: INTERNAL MEDICINE

## 2023-03-09 PROCEDURE — 85027 COMPLETE CBC AUTOMATED: CPT

## 2023-03-09 PROCEDURE — 2580000003 HC RX 258: Performed by: INTERNAL MEDICINE

## 2023-03-09 PROCEDURE — 99222 1ST HOSP IP/OBS MODERATE 55: CPT | Performed by: INTERNAL MEDICINE

## 2023-03-09 PROCEDURE — 85384 FIBRINOGEN ACTIVITY: CPT

## 2023-03-09 PROCEDURE — 93970 EXTREMITY STUDY: CPT

## 2023-03-09 PROCEDURE — 85610 PROTHROMBIN TIME: CPT

## 2023-03-09 PROCEDURE — 2700000000 HC OXYGEN THERAPY PER DAY

## 2023-03-09 PROCEDURE — 6360000004 HC RX CONTRAST MEDICATION: Performed by: INTERNAL MEDICINE

## 2023-03-09 PROCEDURE — C8929 TTE W OR WO FOL WCON,DOPPLER: HCPCS

## 2023-03-09 PROCEDURE — 2000000000 HC ICU R&B

## 2023-03-09 PROCEDURE — 94640 AIRWAY INHALATION TREATMENT: CPT

## 2023-03-09 PROCEDURE — 36415 COLL VENOUS BLD VENIPUNCTURE: CPT

## 2023-03-09 PROCEDURE — 99232 SBSQ HOSP IP/OBS MODERATE 35: CPT | Performed by: INTERNAL MEDICINE

## 2023-03-09 PROCEDURE — 85520 HEPARIN ASSAY: CPT

## 2023-03-09 RX ORDER — HYDROMORPHONE HYDROCHLORIDE 1 MG/ML
0.5 INJECTION, SOLUTION INTRAMUSCULAR; INTRAVENOUS; SUBCUTANEOUS ONCE
Status: DISCONTINUED | OUTPATIENT
Start: 2023-03-09 | End: 2023-03-09 | Stop reason: CLARIF

## 2023-03-09 RX ORDER — METOPROLOL SUCCINATE 25 MG/1
12.5 TABLET, EXTENDED RELEASE ORAL 2 TIMES DAILY
Status: DISCONTINUED | OUTPATIENT
Start: 2023-03-09 | End: 2023-03-09

## 2023-03-09 RX ORDER — SODIUM CHLORIDE 0.9 % (FLUSH) 0.9 %
5-40 SYRINGE (ML) INJECTION PRN
Status: DISCONTINUED | OUTPATIENT
Start: 2023-03-09 | End: 2023-03-11 | Stop reason: HOSPADM

## 2023-03-09 RX ORDER — POTASSIUM CHLORIDE 7.45 MG/ML
10 INJECTION INTRAVENOUS PRN
Status: DISCONTINUED | OUTPATIENT
Start: 2023-03-09 | End: 2023-03-11 | Stop reason: HOSPADM

## 2023-03-09 RX ORDER — SODIUM CHLORIDE 0.9 % (FLUSH) 0.9 %
5-40 SYRINGE (ML) INJECTION EVERY 12 HOURS SCHEDULED
Status: DISCONTINUED | OUTPATIENT
Start: 2023-03-09 | End: 2023-03-11 | Stop reason: HOSPADM

## 2023-03-09 RX ORDER — MAGNESIUM HYDROXIDE/ALUMINUM HYDROXICE/SIMETHICONE 120; 1200; 1200 MG/30ML; MG/30ML; MG/30ML
30 SUSPENSION ORAL EVERY 6 HOURS PRN
Status: DISCONTINUED | OUTPATIENT
Start: 2023-03-09 | End: 2023-03-11 | Stop reason: HOSPADM

## 2023-03-09 RX ORDER — ACETAMINOPHEN 650 MG/1
650 SUPPOSITORY RECTAL EVERY 6 HOURS PRN
Status: DISCONTINUED | OUTPATIENT
Start: 2023-03-09 | End: 2023-03-11 | Stop reason: HOSPADM

## 2023-03-09 RX ORDER — ACETAMINOPHEN 325 MG/1
650 TABLET ORAL EVERY 6 HOURS PRN
Status: DISCONTINUED | OUTPATIENT
Start: 2023-03-09 | End: 2023-03-11 | Stop reason: HOSPADM

## 2023-03-09 RX ORDER — POTASSIUM CHLORIDE 20 MEQ/1
40 TABLET, EXTENDED RELEASE ORAL PRN
Status: DISCONTINUED | OUTPATIENT
Start: 2023-03-09 | End: 2023-03-11 | Stop reason: HOSPADM

## 2023-03-09 RX ORDER — BISACODYL 10 MG
10 SUPPOSITORY, RECTAL RECTAL DAILY PRN
Status: DISCONTINUED | OUTPATIENT
Start: 2023-03-09 | End: 2023-03-11 | Stop reason: HOSPADM

## 2023-03-09 RX ORDER — ONDANSETRON 4 MG/1
4 TABLET, ORALLY DISINTEGRATING ORAL EVERY 8 HOURS PRN
Status: DISCONTINUED | OUTPATIENT
Start: 2023-03-09 | End: 2023-03-11 | Stop reason: HOSPADM

## 2023-03-09 RX ORDER — SODIUM CHLORIDE 9 MG/ML
INJECTION, SOLUTION INTRAVENOUS PRN
Status: DISCONTINUED | OUTPATIENT
Start: 2023-03-09 | End: 2023-03-11 | Stop reason: HOSPADM

## 2023-03-09 RX ORDER — ATORVASTATIN CALCIUM 40 MG/1
80 TABLET, FILM COATED ORAL NIGHTLY
Status: DISCONTINUED | OUTPATIENT
Start: 2023-03-09 | End: 2023-03-11 | Stop reason: HOSPADM

## 2023-03-09 RX ORDER — ONDANSETRON 2 MG/ML
4 INJECTION INTRAMUSCULAR; INTRAVENOUS EVERY 6 HOURS PRN
Status: DISCONTINUED | OUTPATIENT
Start: 2023-03-09 | End: 2023-03-11 | Stop reason: HOSPADM

## 2023-03-09 RX ORDER — CARVEDILOL 12.5 MG/1
12.5 TABLET ORAL 2 TIMES DAILY WITH MEALS
Status: DISCONTINUED | OUTPATIENT
Start: 2023-03-09 | End: 2023-03-11 | Stop reason: HOSPADM

## 2023-03-09 RX ORDER — POLYETHYLENE GLYCOL 3350 17 G/17G
17 POWDER, FOR SOLUTION ORAL DAILY PRN
Status: DISCONTINUED | OUTPATIENT
Start: 2023-03-09 | End: 2023-03-11 | Stop reason: HOSPADM

## 2023-03-09 RX ORDER — MAGNESIUM SULFATE IN WATER 40 MG/ML
2000 INJECTION, SOLUTION INTRAVENOUS PRN
Status: DISCONTINUED | OUTPATIENT
Start: 2023-03-09 | End: 2023-03-11 | Stop reason: HOSPADM

## 2023-03-09 RX ORDER — FAMOTIDINE 20 MG/1
10 TABLET, FILM COATED ORAL DAILY PRN
Status: DISCONTINUED | OUTPATIENT
Start: 2023-03-09 | End: 2023-03-11 | Stop reason: HOSPADM

## 2023-03-09 RX ORDER — TRAMADOL HYDROCHLORIDE 50 MG/1
50 TABLET ORAL EVERY 6 HOURS PRN
Status: DISCONTINUED | OUTPATIENT
Start: 2023-03-09 | End: 2023-03-11 | Stop reason: HOSPADM

## 2023-03-09 RX ADMIN — SODIUM CHLORIDE, PRESERVATIVE FREE 0.45 ML: 5 INJECTION INTRAVENOUS at 09:51

## 2023-03-09 RX ADMIN — SODIUM CHLORIDE, PRESERVATIVE FREE 10 ML: 5 INJECTION INTRAVENOUS at 20:31

## 2023-03-09 RX ADMIN — BUDESONIDE 500 MCG: 0.5 INHALANT RESPIRATORY (INHALATION) at 19:10

## 2023-03-09 RX ADMIN — CARVEDILOL 12.5 MG: 12.5 TABLET, FILM COATED ORAL at 16:37

## 2023-03-09 RX ADMIN — IPRATROPIUM BROMIDE 0.5 MG: 0.5 SOLUTION RESPIRATORY (INHALATION) at 15:12

## 2023-03-09 RX ADMIN — BUDESONIDE 500 MCG: 0.5 INHALANT RESPIRATORY (INHALATION) at 07:43

## 2023-03-09 RX ADMIN — IPRATROPIUM BROMIDE 0.5 MG: 0.5 SOLUTION RESPIRATORY (INHALATION) at 19:10

## 2023-03-09 RX ADMIN — IPRATROPIUM BROMIDE 0.5 MG: 0.5 SOLUTION RESPIRATORY (INHALATION) at 07:43

## 2023-03-09 RX ADMIN — ALBUTEROL SULFATE 2.5 MG: 2.5 SOLUTION RESPIRATORY (INHALATION) at 15:12

## 2023-03-09 RX ADMIN — ALBUTEROL SULFATE 2.5 MG: 2.5 SOLUTION RESPIRATORY (INHALATION) at 19:10

## 2023-03-09 RX ADMIN — ATORVASTATIN CALCIUM 80 MG: 80 TABLET, FILM COATED ORAL at 20:29

## 2023-03-09 RX ADMIN — ALBUTEROL SULFATE 2.5 MG: 2.5 SOLUTION RESPIRATORY (INHALATION) at 07:43

## 2023-03-09 ASSESSMENT — PAIN SCALES - GENERAL
PAINLEVEL_OUTOF10: 0

## 2023-03-09 ASSESSMENT — COPD QUESTIONNAIRES: CAT_SEVERITY: MILD

## 2023-03-09 NOTE — INTERDISCIPLINARY ROUNDS
Multi-D Rounds/Checklist (leapfrog):  Lines: can any be removed?: None      Venous Sheath 03/08/23 Right (Active)     DVT Prophylaxis: Ordered  Vent: N/A  Nutrition Ordered/appropriate: Ordered  Can antibiotics or other drugs be stopped? Is Nozin performed: N/A  Inpat Anti-Infectives (From admission, onward)      None          Consults needed: None  A: Is pain control adequate? (has PRNs? Stop drip?) N/A  B: Sedation break and SBT? N/A  C: Is sedation choice appropriate? N/A  D: Delirium/CAM-ICU? No  E: Mobility goals/appropriateness? Yes  F: Family update and plan? Wife is primary contact and is being updated daily by primary attending and nursing staff.     April Kumari, APRN - CNP

## 2023-03-09 NOTE — PROGRESS NOTES
PULMONARY/CRITICAL CARE Daily Progress Note       3/9/2023    Sloane Leon [facility]                        Date of Admission:  3/8/2023    Hospital Course:  Patient is a 75 y.o.  male seen and evaluated at the request of Dr. Racheal Lehman. Patient with HTN/prior smoker (quit Nov 2022 with 50 pack years smoking history)/COPD with recent PE in 11/22 and was intermittently on Eliquis came in with SOB and noted to have B/L PE with hypoxemia. Now s/p PE lysis with b/l infusion catheters. Patient transferred to ICU with NRB with saturation at 89-91%. Awake and alert. Not in distress. He also reports takes Testerone injections. No pain. No leg swelling. He reports non-compliance with Eliquis was due to cost and was use baby ASA as outpatient instead. The patient's chart is reviewed and the patient is discussed with the staff. Subjective:     Patient today is awake and alert. Not in distress. Oxygen needs down on airvo to 40/40 today.      Review of Systems: Comprehensive ROS negative except in HPI    Current Outpatient Medications   Medication Instructions    apixaban (ELIQUIS) 5 MG TABS tablet Take 10 mg every 12 hours for 7 days (last dose on 11-15-22 at 9 PM), then decrease to 5 mg every 12 hours on 11-16-22 at 9 AM    aspirin 81 mg, Oral, DAILY    atorvastatin (LIPITOR) 80 MG tablet TAKE 1 TABLET EVERY NIGHT    metoprolol succinate (TOPROL XL) 12.5 mg, Oral, 2 times daily    nicotine (NICODERM CQ) 7 MG/24HR 1 patch, TransDERmal, EVERY 24 HOURS    tiotropium (SPIRIVA RESPIMAT) 2.5 MCG/ACT AERS inhaler 2 puffs, Inhalation, DAILY    traMADol (ULTRAM) 50 mg, Oral, EVERY 6 HOURS PRN      Past Medical History:   Diagnosis Date    Arthritis     Asthma     Calculus of kidney     Hypercholesterolemia     Hypertension      Past Surgical History:   Procedure Laterality Date    HEENT Bilateral     cataracts    IR THROMB [facility] VEIN  11/8/2022    IR THROMB DAUTERIVE HOSPITAL VEIN 11/8/2022 [facility] SURGERY      rotator cuff ten years ago     Social History     Socioeconomic History    Marital status:      Spouse name: Not on file    Number of children: Not on file    Years of education: Not on file    Highest education level: Not on file   Occupational History    Not on file   Tobacco Use    Smoking status: Former     Types: Cigars     Quit date: 2022     Years since quittin.3     Passive exposure: Current    Smokeless tobacco: Never   Vaping Use    Vaping Use: Never used   Substance and Sexual Activity    Alcohol use: Yes     Alcohol/week: 6.0 standard drinks     Types: 6 Cans of beer per week    Drug use: No    Sexual activity: Not on file   Other Topics Concern    Not on file   Social History Narrative    Not on file     Social Determinants of Health     Financial Resource Strain: Low Risk     Difficulty of Paying Living Expenses: Not hard at all   Food Insecurity: No Food Insecurity    Worried About Running Out of Food in the Last Year: Never true    Ran Out of Food in the Last Year: Never true   Transportation Needs: Not on file   Physical Activity: Inactive    Days of Exercise per Week: 0 days    Minutes of Exercise per Session: 0 min   Stress: Not on file   Social Connections: Not on file   Intimate Partner Violence: Not on file   Housing Stability: Not on file     Family History   Problem Relation Age of Onset    Asthma Mother     Thyroid Disease Mother     COPD Mother     Heart Disease Father     Hypertension Father     Cancer Neg Hx     Deep Vein Thrombosis Neg Hx      Allergies   Allergen Reactions    Methocarbamol Hives    Ciprofloxacin Nausea And Vomiting     Objective:   Blood pressure 128/89, pulse 96, temperature 98 °F (36.7 °C), temperature source Axillary, resp. rate 19, height 5' 7\" (1.702 m), weight 230 lb (104.3 kg), SpO2 96 %.    Intake/Output Summary (Last 24 hours) at 3/9/2023 0710  Last data filed at 3/9/2023 0707  Gross per 24 hour   Intake 849.44 ml   Output 800 ml   Net 49.44 ml       PHYSICAL EXAM   Constitutional:  the patient is well developed and in no acute distress  EENMT:  Sclera clear, pupils equal, oral mucosa moist,   Respiratory: symmetric chest rise. Distant sounds. No wheezing  Cardiovascular:  RRR without M,G,R. There is + lower extremity edema of right leg vs left. Right leg is more firm around calf  Gastrointestinal: soft and non-tender; with positive bowel sounds. Musculoskeletal: warm without cyanosis. Normal muscle tone. See above regarding Right leg. Skin:  no jaundice or rashes, no wounds   Neurologic: symmetric strength, fluent speech  Psychiatric:  calm, appropriate, oriented x 4    Imaging: I performed an independent interpretation of the patient's images. CT Chest: noted b/l pe      Recent Labs     03/08/23  1306 03/08/23  1816 03/08/23  2336 03/09/23  0554   WBC 14.9* 14.4* 12.2* 11.6*   HGB 18.3* 15.9 14.5 13.8   HCT 56.6* 50.1 45.0 44.2    146* 118* 104*   INR  --  1.1  --   --      --   --   --    K 4.3  --   --   --      --   --   --    CO2 25  --   --   --    BUN 24*  --   --   --    CREATININE 1.40  --   --   --    MG 2.2  --   --   --    BILITOT 0.5  --   --   --    AST 23  --   --   --    ALT 28  --   --   --    ALKPHOS 76  --   --   --    TROPHS 287.1*  --   --   --    NTPROBNP 293*  --   --   --        ECHO: 11/07/22    TRANSTHORACIC ECHOCARDIOGRAM (TTE) COMPLETE (CONTRAST/BUBBLE/3D PRN) 11/08/2022  4:45 PM, 11/08/2022 12:00 AM (Final)    Interpretation Summary    Left Ventricle: Mildly reduced left ventricular systolic function with a visually estimated EF of 45 - 50%. Left ventricle size is normal. Normal wall thickness. Mild global hypokinesis present. Abnormal diastolic function. Right Ventricle: Right ventricle is mildly dilated. Mildly reduced systolic function. Aortic Valve: Tricuspid valve. Mild sclerosis of the aortic valve cusp. Mitral Valve: Mild regurgitation.     Technical qualifiers: Color flow Doppler was performed and pulse wave and/or continuous wave Doppler was performed. Contrast used: Definity. Signed by: Noel Jimenez MD on 11/8/2022  4:45 PM, Signed by: Unknown Provider Result on 11/8/2022 12:00 AM    MICRO:   Recent Labs     03/08/23  1310 03/08/23  1819   CULTURE NO GROWTH AFTER 16 HOURS NO GROWTH AFTER 11 HOURS     Assessment and Plan:  (Medical Decision Making)   Principal Problem:    Acute bilateral pulmonary embolism ([address]  --getting TPA at this time. F/u with IR  --will need to be compliant with final drug regimine either xarelto or eliquis. Note -- NON compliance with tx was likely reason for failure from his prior PE in November. PMD did get hematology and f/u their recommendations. --will need to stop using testosterone shots    Active Problems:    Acute respiratory failure with hypoxia (HCC)  --taper airvo    History of smoking (50 pack year) and ?COPD  --continue xopenex/atrovent and pulmicort  --will need formal PFTs in the future. Pain in both lower extremities  --noted asymmetry of Right calf but does have evidence of varicose veins and prior trauma to left ankle. US legs done and report pending  --secondary to PE         Chronic combined systolic and diastolic congestive heart failure ([address]  --per PMD    Essential hypertension  Plan: per PMD    Coronary artery disease involving native coronary artery of native heart without angina pectoris  Plan: per PMD      Prior    More than 50% of the time documented was spent in face-to-face contact with the patient and in the care of the patient on the floor/unit where the patient is located.              Campbell Hernandez MD

## 2023-03-09 NOTE — PROGRESS NOTES
Hospitalist Progress Note   Admit Date:  3/8/2023  1:04 PM   Name:  Pérez Duke   Age:  75 y.o. Sex:  male  :  1955   MRN:  [id number]   Room:  310/    Presenting Complaint: Dizziness and Extremity Weakness     Reason(s) for Admission: Bilateral pulmonary embolism ([address] [I26.99]  Acute respiratory failure with hypoxia ([address] [J96.01]  Other acute pulmonary embolism with acute cor pulmonale ([address] [I26.09]     Hospital Course:   Pérez Duke is a 75 y.o. male with medical history of PAD, hypertension, CAD, hyperlipidemia, bilateral PE status post thrombectomy and RLE DVT on eliquis who presented with acute onset of dyspnea on exertion that started this AM. Also reporting B/l LE weakness with muscle cramps around his calves as well as LUE weakness w/o numbness or tingling. Reports missing a few doses of liquids as well as being on testosterone supplements. In the Emergency room, patient was tachycardic, tachypneic and saturating 87% on room air requiring 15 L O2 nasal cannula to maintain his saturation. CT chest shows acute appearing occlusive bilateral pulmonary emboli with large clot burden and findings suggestive of right heart strain. IR consulted and underwent lysis of PE. Subjective & 24hr Events (23): Patient is seen and examined at bedside. No acute events noted overnight. Patient is currently on Airvo to maintain his saturation. Underwent lysis of PE yesterday. Ultrasound today shows extensive right lower extremity DVT. Denies any fever, chills, chest pain, nausea, vomiting. Assessment & Plan:   Acute respiratory failure with hypoxia due to bilateral pulmonary embolism , acute  Hx B/l Pulm Embolism s/p thrombectomy and  RLE DVT in 2022  CT imagine showing extensive bilateral PE with clot burden. Hypoxic to 87% on RA  - continue with heparin gtt  - Appreciate IR's assistance with lysis of PE  - US of LE with extensive RLE DVT.  Consulted IR for eval for thrombectomy of DVT  - hematology recs appreciated. Planned eliquis as PE/DVT due to non compliance issue and concurrent testosterone injections. Pt agreed to stopped testosterone  - O2 supplement and wean as tolerated. Currently on Airvo 40L/40%     Sinus tachycardia due to hypoxia from PE  - monitor     Chronic combined systolic and diastolic CHF  Echo 49/98 with EF 45-50% and abnormal diastolic function  BNP of 293 this admission but 4k in 11/22  - Echo today with EF 45-50%, abnormal diastolic function and global hypokinesis  - change metoprolol to coreg. Jose Power LUE weakness  With PE while on Eliquis, patient is at risk for CVA. LUE is slightly weak on exam  - resolved after tPA    CAD // HTN // HLD  - Asa, lipitor , metoprolol    Hx of smoking  50 pack yr  - PFT as outpatietn  - nebs  - pulmonary recs appreciated      Diet:  ADULT DIET; Regular  VTE prophylaxis:  heparin gtt  Code status: Full Code    Additional Medical Decision Making factors:  Complexity: 1 or more acute or chronic illness with exacerbation that poses a threat to life, organ, or function. (High)  Complexity: 2 or more stable chronic illnesses. (Moderate)      I conducted an independent review of: Labs   I conducted an independent review of: Imaging and/or other diagnostic studies     Treatment Risks: Drug requiring intensive monitoring for toxicity. (High)  Treatment Risks: Prescription drug management. (Moderate)    Shared decision making was utilized in the care of this patient. I discussed patient's case with an interdisciplinary team.  I discussed patient's case with ICU attending regarding weaning off Airvo  I discussed patient's case with a . I discussed patient's case with a nurse. Critical care time: 40 minutes    Patient is critically ill.   Without intervention, there is a high probability of acute organ impairment or life-threatening deterioration in the patient's condition from: submassive PE, RLE DVT, respiratory failure    Critical care interventions: Heparin gtt, Airvo    More than 50% of the time documented was spent in face-to-face contact with the patient and in the care of the patient on the floor/unit where the patient is located. Due to a high probability of clinically significant, life-threatening deterioration that could result in multiorgan failure,  this critically ill or injured patient required my highest level preparedness to intervene emergent. This critical care time included time spent at bedside performing patient interview and physical exam, time spent researching patient prior to interaction with patient, time spent discussing findings and treatment plan with patient and/or family, time spent discussing patient with consultants and colleagues, time spent reviewing pertinent laboratory and radiographic evaluations, time spent re-evaluating patient, or time spent discussing patient with nursing staff. The time listed is exclusive of any procedures which may have been performed. Hospital Problems:  Principal Problem:    Bilateral pulmonary embolism (HCC)  Active Problems:    Sinus tachycardia    Acute pulmonary embolism, unspecified pulmonary embolism type, unspecified whether acute cor pulmonale present (HCC)    Chronic combined systolic and diastolic congestive heart failure (HCC)    LUE weakness    Acute respiratory failure with hypoxia (HCC)    Pain in both lower extremities    PAD (peripheral artery disease) ([facility]    Essential hypertension    Coronary artery disease involving native coronary artery of native heart without angina pectoris  Resolved Problems:    * No resolved hospital problems.  *      Objective:   Patient Vitals for the past 24 hrs:   Temp Pulse Resp BP SpO2   03/09/23 1145 -- 92 20 137/86 96 %   03/09/23 1130 98.8 °F (37.1 °C) 93 21 130/82 96 %   03/09/23 1125 -- 94 17 -- 96 %   03/09/23 1115 -- 92 22 118/75 96 %   03/09/23 1100 -- 98 29 132/89 95 %   03/09/23 1045 -- 95 15 129/84 94 %   03/09/23 1030 -- 93 30 138/86 95 %   03/09/23 1015 -- 92 18 136/88 93 %   03/09/23 1000 -- 93 20 138/82 94 %   03/09/23 0945 -- 92 20 (!) 151/76 94 %   03/09/23 0930 -- 98 (!) 63 (!) 147/87 93 %   03/09/23 0915 -- 94 20 (!) 126/90 94 %   03/09/23 0900 -- 96 27 (!) 138/92 94 %   03/09/23 0845 -- (!) 102 (!) 37 139/87 93 %   03/09/23 0830 98.3 °F (36.8 °C) 95 21 (!) 148/85 93 %   03/09/23 0815 -- 94 23 137/85 93 %   03/09/23 0800 -- 95 20 132/85 95 %   03/09/23 0745 -- 91 16 123/81 95 %   03/09/23 0744 -- 92 19 -- 95 %   03/09/23 0743 -- 92 20 -- 95 %   03/09/23 0730 -- 94 24 139/77 96 %   03/09/23 0715 -- 94 27 139/88 95 %   03/09/23 0700 -- 93 23 138/84 96 %   03/09/23 0645 -- 93 22 (!) 141/90 96 %   03/09/23 0630 -- 96 19 128/89 96 %   03/09/23 0615 -- 100 (!) 34 (!) 131/90 98 %   03/09/23 0600 -- 96 17 139/82 96 %   03/09/23 0545 -- 95 18 (!) 151/94 95 %   03/09/23 0530 -- 92 17 125/82 97 %   03/09/23 0515 -- 92 18 133/87 98 %   03/09/23 0500 -- 96 19 131/84 98 %   03/09/23 0445 -- 93 18 117/76 98 %   03/09/23 0430 -- 93 20 128/78 97 %   03/09/23 0415 -- 96 23 133/88 97 %   03/09/23 0400 -- 98 15 131/89 99 %   03/09/23 0345 -- 95 17 (!) 137/90 99 %   03/09/23 0330 -- 96 21 125/88 98 %   03/09/23 0315 -- 96 (!) 38 134/70 97 %   03/09/23 0300 98 °F (36.7 °C) 98 23 129/88 98 %   03/09/23 0245 -- 99 18 125/83 97 %   03/09/23 0230 -- (!) 102 19 (!) 132/90 96 %   03/09/23 0215 -- (!) 102 18 138/89 98 %   03/09/23 0200 -- (!) 101 19 132/85 98 %   03/09/23 0145 -- (!) 102 23 (!) 131/96 98 %   03/09/23 0130 -- (!) 111 27 126/84 97 %   03/09/23 0115 -- 100 18 130/80 98 %   03/09/23 0100 -- (!) 103 -- 134/85 98 %   03/09/23 0045 -- (!) 103 24 127/84 98 %   03/09/23 0030 -- (!) 105 29 (!) 146/97 96 %   03/09/23 0015 -- (!) 102 (!) 32 128/80 99 %   03/09/23 0000 -- (!) 105 18 136/81 98 %   03/08/23 2345 -- (!) 107 (!) 34 (!) 144/85 92 %   03/08/23 2330 -- (!) 112 27 124/85 96 %   03/08/23 2315 -- (!) 106 (!) 52 (!) 133/90 98 %   03/08/23 2300 97.8 °F (36.6 °C) (!) 106 27 127/85 99 %   03/08/23 2245 -- (!) 109 19 127/88 98 %   03/08/23 2230 -- (!) 109 22 119/86 98 %   03/08/23 2215 -- (!) 115 24 121/86 99 %   03/08/23 2200 -- (!) 120 -- 122/84 97 %   03/08/23 2145 -- (!) 118 -- 122/86 97 %   03/08/23 2130 -- (!) 114 23 123/86 96 %   03/08/23 2115 -- (!) 116 29 133/81 96 %   03/08/23 2100 -- (!) 122 (!) 37 117/82 94 %   03/08/23 2045 -- (!) 122 21 135/88 95 %   03/08/23 2030 -- (!) 123 29 132/85 96 %   03/08/23 2015 -- (!) 124 23 131/88 95 %   03/08/23 2000 -- (!) 120 27 129/83 94 %   03/08/23 1945 -- (!) 123 28 134/88 90 %   03/08/23 1930 98.4 °F (36.9 °C) (!) 120 25 133/89 91 %   03/08/23 1918 -- (!) 121 (!) 35 -- 94 %   03/08/23 1916 -- (!) 122 28 -- 95 %   03/08/23 1915 -- (!) 122 25 133/84 94 %   03/08/23 1900 -- (!) 128 (!) 34 115/82 90 %   03/08/23 1845 -- (!) 125 (!) 45 120/86 94 %   03/08/23 1830 -- (!) 128 (!) 53 114/88 90 %   03/08/23 1815 -- (!) 127 29 129/86 (!) 88 %   03/08/23 1800 98.1 °F (36.7 °C) (!) 126 29 116/80 (!) 88 %   03/08/23 1727 -- (!) 127 22 (!) 115/94 94 %   03/08/23 1722 -- (!) 127 22 (!) 115/90 93 %   03/08/23 1717 -- (!) 134 22 102/82 91 %   03/08/23 1712 -- (!) 132 22 (!) 107/93 91 %   03/08/23 1707 -- (!) 134 22 116/85 90 %   03/08/23 1702 -- (!) 130 24 (!) 133/92 92 %   03/08/23 1657 -- (!) 134 24 (!) 130/96 93 %   03/08/23 1545 97.5 °F (36.4 °C) (!) 137 24 (!) 130/98 91 %   03/08/23 1456 -- (!) 134 30 115/86 --   03/08/23 1446 -- (!) 138 19 (!) 118/90 --       Oxygen Therapy  SpO2: 96 %  Pulse Oximetry Type: Continuous  Pulse via Oximetry: 91 beats per minute  Pulse Oximeter Device Mode: Continuous  O2 Device: Nasal cannula  FiO2 : 35 %  O2 Flow Rate (L/min): 4 L/min    Estimated body mass index is 36.02 kg/m² as calculated from the following:    Height as of this encounter: 5' 7\" (1.702 m). Weight as of this encounter: 230 lb (104.3 kg).     Intake/Output Summary (Last 24 hours) at 3/9/2023 1438  Last data filed at 3/9/2023 1137  Gross per 24 hour   Intake 749.44 ml   Output 1550 ml   Net -800.56 ml         Physical Exam:     Blood pressure 137/86, pulse 92, temperature 98.8 °F (37.1 °C), temperature source Oral, resp. rate 20, height 5' 7\" (1.702 m), weight 230 lb (104.3 kg), SpO2 96 %. General:    Well nourished. Head:  Normocephalic, atraumatic  Eyes:  Sclerae appear normal.  Pupils equally round. ENT:  Nares appear normal.  Moist oral mucosa  Neck:  No restricted ROM. Trachea midline   CV:   RRR. No m/r/g. No jugular venous distension. Lungs:   CTAB. No wheezing  Symmetric expansion. Abdomen:   Soft, nontender, nondistended. Extremities: No cyanosis or clubbing.  + edema (R>L)  Skin:     No rashes and normal coloration. Warm and dry. Neuro:  CN II-XII grossly intact. Psych:  Normal mood and affect.       I have personally reviewed labs and tests:  Recent Labs:  Recent Results (from the past 48 hour(s))   CBC with Auto Differential    Collection Time: 03/08/23  1:06 PM   Result Value Ref Range    WBC 14.9 (H) 4.3 - 11.1 K/uL    RBC 5.83 (H) 4.23 - 5.6 M/uL    Hemoglobin 18.3 (H) 13.6 - 17.2 g/dL    Hematocrit 56.6 (H) 41.1 - 50.3 %    MCV 97.1 82 - 102 FL    MCH 31.4 26.1 - 32.9 PG    MCHC 32.3 31.4 - 35.0 g/dL    RDW 13.9 11.9 - 14.6 %    Platelets 605 035 - 128 K/uL    MPV 10.3 9.4 - 12.3 FL    nRBC 0.00 0.0 - 0.2 K/uL    Differential Type AUTOMATED      Seg Neutrophils 78 43 - 78 %    Lymphocytes 11 (L) 13 - 44 %    Monocytes 7 4.0 - 12.0 %    Eosinophils % 1 0.5 - 7.8 %    Basophils 1 0.0 - 2.0 %    Immature Granulocytes 2 0.0 - 5.0 %    Segs Absolute 11.9 (H) 1.7 - 8.2 K/UL    Absolute Lymph # 1.6 0.5 - 4.6 K/UL    Absolute Mono # 1.0 0.1 - 1.3 K/UL    Absolute Eos # 0.1 0.0 - 0.8 K/UL    Basophils Absolute 0.1 0.0 - 0.2 K/UL    Absolute Immature Granulocyte 0.2 0.0 - 0.5 K/UL   Comprehensive Metabolic Panel    Collection Time: 03/08/23  1:06 PM   Result Value Ref Range    Sodium 135 133 - 143 mmol/L    Potassium 4.3 3.5 - 5.1 mmol/L    Chloride 104 101 - 110 mmol/L    CO2 25 21 - 32 mmol/L    Anion Gap 6 2 - 11 mmol/L    Glucose 132 (H) 65 - 100 mg/dL    BUN 24 (H) 8 - 23 MG/DL    Creatinine 1.40 0.8 - 1.5 MG/DL    Est, Glom Filt Rate 55 (L) >60 ml/min/1.73m2    Calcium 11.1 (H) 8.3 - 10.4 MG/DL    Total Bilirubin 0.5 0.2 - 1.1 MG/DL    ALT 28 12 - 65 U/L    AST 23 15 - 37 U/L    Alk Phosphatase 76 50 - 136 U/L    Total Protein 8.5 (H) 6.3 - 8.2 g/dL    Albumin 3.6 3.2 - 4.6 g/dL    Globulin 4.9 (H) 2.8 - 4.5 g/dL    Albumin/Globulin Ratio 0.7 0.4 - 1.6     Magnesium    Collection Time: 03/08/23  1:06 PM   Result Value Ref Range    Magnesium 2.2 1.8 - 2.4 mg/dL   Troponin    Collection Time: 03/08/23  1:06 PM   Result Value Ref Range    Troponin, High Sensitivity 287.1 (HH) 0 - 14 pg/mL   Procalcitonin    Collection Time: 03/08/23  1:06 PM   Result Value Ref Range    Procalcitonin <0.05 0.00 - 0.49 ng/mL   Brain Natriuretic Peptide    Collection Time: 03/08/23  1:06 PM   Result Value Ref Range    NT Pro- (H) 5 - 125 PG/ML   Culture, Blood 1    Collection Time: 03/08/23  1:10 PM    Specimen: Blood   Result Value Ref Range    Special Requests RIGHT  FOREARM        Culture NO GROWTH AFTER 16 HOURS     Lactic Acid    Collection Time: 03/08/23  1:10 PM   Result Value Ref Range    Lactic Acid, Plasma 1.8 0.4 - 2.0 MMOL/L   EKG 12 Lead    Collection Time: 03/08/23  1:18 PM   Result Value Ref Range    Ventricular Rate 144 BPM    Atrial Rate 146 BPM    P-R Interval 119 ms    QRS Duration 88 ms    Q-T Interval 275 ms    QTc Calculation (Bazett) 426 ms    P Axis 69 degrees    R Axis 77 degrees    T Axis -16 degrees    Diagnosis Sinus tachycardia    Arterial Blood Gas, POC    Collection Time: 03/08/23  1:37 PM   Result Value Ref Range    DEVICE NASAL CANNULA      pH, Arterial, POC 7.45 7.35 - 7.45      pCO2, Arterial, POC 33.7 (L) 35 - 45 MMHG    pO2, Arterial, POC 58 (L) 75 - 100 MMHG    HCO3, Mixed 23.5 22 - 26 MMOL/L    SO2c, Arterial, POC 91.3 (L) 95 - 98 %    Base Excess 0.4 mmol/L    POC Kushal's Test Positive      Site RIGHT RADIAL      Specimen type: ARTERIAL      Performed by: Amber     POC TCO2 22 13 - 23 MMOL/L    FIO2 10     APTT    Collection Time: 03/08/23  6:16 PM   Result Value Ref Range    PTT 99.5 (H) 24.5 - 34.2 SEC   CBC    Collection Time: 03/08/23  6:16 PM   Result Value Ref Range    WBC 14.4 (H) 4.3 - 11.1 K/uL    RBC 5.05 4.23 - 5.6 M/uL    Hemoglobin 15.9 13.6 - 17.2 g/dL    Hematocrit 50.1 41.1 - 50.3 %    MCV 99.2 82 - 102 FL    MCH 31.5 26.1 - 32.9 PG    MCHC 31.7 31.4 - 35.0 g/dL    RDW 14.0 11.9 - 14.6 %    Platelets 226 (L) 901 - 450 K/uL    MPV 10.7 9.4 - 12.3 FL    nRBC 0.00 0.0 - 0.2 K/uL   Protime-INR    Collection Time: 03/08/23  6:16 PM   Result Value Ref Range    Protime 14.7 (H) 12.6 - 14.3 sec    INR 1.1     Anti-Xa, Unfractionated Heparin    Collection Time: 03/08/23  6:16 PM   Result Value Ref Range    Anti-XA Unfrac Heparin 0.68 0.3 - 0.7 IU/mL   Culture, Blood 1    Collection Time: 03/08/23  6:19 PM    Specimen: Blood   Result Value Ref Range    Special Requests LEFT  HAND        Culture NO GROWTH AFTER 11 HOURS     Anti-Xa, Unfractionated Heparin    Collection Time: 03/08/23  7:59 PM   Result Value Ref Range    Anti-XA Unfrac Heparin 0.34 0.3 - 0.7 IU/mL   Anti-Xa, Unfractionated Heparin    Collection Time: 03/08/23 11:36 PM   Result Value Ref Range    Anti-XA Unfrac Heparin <0.10 (L) 0.3 - 0.7 IU/mL   CBC    Collection Time: 03/08/23 11:36 PM   Result Value Ref Range    WBC 12.2 (H) 4.3 - 11.1 K/uL    RBC 4.60 4.23 - 5.6 M/uL    Hemoglobin 14.5 13.6 - 17.2 g/dL    Hematocrit 45.0 41.1 - 50.3 %    MCV 97.8 82 - 102 FL    MCH 31.5 26.1 - 32.9 PG    MCHC 32.2 31.4 - 35.0 g/dL    RDW 14.0 11.9 - 14.6 %    Platelets 922 (L) 500 - 450 K/uL    MPV 10.9 9.4 - 12.3 FL    nRBC 0.00 0.0 - 0.2 K/uL   APTT    Collection Time: 03/08/23 11:36 PM   Result Value Ref Range    PTT 37.1 (H) 24.5 - 34.2 SEC   Fibrinogen    Collection Time: 03/08/23 11:36 PM   Result Value Ref Range    Fibrinogen 409 190 - 501 mg/dL   CBC    Collection Time: 03/09/23  5:54 AM   Result Value Ref Range    WBC 11.6 (H) 4.3 - 11.1 K/uL    RBC 4.45 4.23 - 5.6 M/uL    Hemoglobin 13.8 13.6 - 17.2 g/dL    Hematocrit 44.2 41.1 - 50.3 %    MCV 99.3 82 - 102 FL    MCH 31.0 26.1 - 32.9 PG    MCHC 31.2 (L) 31.4 - 35.0 g/dL    RDW 14.0 11.9 - 14.6 %    Platelets 651 (L) 982 - 450 K/uL    MPV 10.8 9.4 - 12.3 FL    nRBC 0.00 0.0 - 0.2 K/uL   APTT    Collection Time: 03/09/23  5:54 AM   Result Value Ref Range    PTT 35.7 (H) 24.5 - 34.2 SEC   Fibrinogen    Collection Time: 03/09/23  5:54 AM   Result Value Ref Range    Fibrinogen 357 190 - 501 mg/dL   Protime-INR    Collection Time: 03/09/23  5:54 AM   Result Value Ref Range    Protime 14.7 (H) 12.6 - 14.3 sec    INR 1.1     Anti-Xa, Unfractionated Heparin    Collection Time: 03/09/23  5:54 AM   Result Value Ref Range    Anti-XA Unfrac Heparin <0.1 (L) 0.3 - 0.7 IU/mL   Transthoracic echocardiogram (TTE) complete with contrast, bubble, strain, and 3D PRN    Collection Time: 03/09/23  8:30 AM   Result Value Ref Range    Body Surface Area 2.22 m2    LV EDV A2C 77 mL    LV EDV A4C 73 mL    LV ESV A2C 41 mL    LV ESV A4C 39 mL    IVSd 0.9 0.6 - 1.0 cm    LVIDd 4.6 4.2 - 5.9 cm    LVIDs 3.7 cm    LVOT Diameter 2.1 cm    LVOT Mean Gradient 1 mmHg    LVOT VTI 13.0 cm    LVOT Peak Velocity 0.9 m/s    LVOT Peak Gradient 3 mmHg    LVPWd 0.9 0.6 - 1.0 cm    LV E' Lateral Velocity 7 cm/s    LV E' Septal Velocity 8 cm/s    LV Ejection Fraction A2C 46 %    LV Ejection Fraction A4C 47 %    EF BP 45 (A) 55 - 100 %    LVOT Area 3.5 cm2    LVOT SV 45.0 ml    LA Minor Axis 5.3 cm    LA Major Gardners 5.4 cm    LA Area 2C 15.7 cm2    LA Area 4C 15.4 cm2    LA Volume 2C 38 18 - 58 mL    LA Volume 4C 34 18 - 58 mL    LA Volume BP 36 18 - 58 mL    AV Mean Velocity 0.8 m/s    AV Mean Gradient 3 mmHg    AV VTI 18.0 cm    AV Peak Velocity 1.2 m/s    AV Peak Gradient 6 mmHg    AV Area by VTI 2.5 cm2    AV Area by Peak Velocity 2.5 cm2    Aortic Root 3.4 cm    Ascending Aorta 3.6 cm    IVC Proxmal 1.9 cm    MV E Wave Deceleration Time 145.0 ms    MV A Velocity 1.27 m/s    MV E Velocity 0.74 m/s    MV Mean Gradient 4 mmHg    MV VTI 21.1 cm    MV Mean Velocity 0.9 m/s    MV Max Velocity 1.3 m/s    MV Peak Gradient 7 mmHg    MV Area by VTI 2.1 cm2    PV .0 ms    PV Max Velocity 0.9 m/s    PV Peak Gradient 3 mmHg    RV Basal Dimension 4.2 cm    RV Free Wall Peak S' 8 cm/s    TAPSE 1.9 1.7 cm    TR Max Velocity 2.85 m/s    TR Peak Gradient 32 mmHg    Fractional Shortening 2D 20 28 - 44 %    LV ESV Index A4C 18 mL/m2    LV EDV Index A4C 34 mL/m2    LV ESV Index A2C 19 mL/m2    LV EDV Index A2C 36 mL/m2    LVIDd Index 2.14 cm/m2    LVIDs Index 1.72 cm/m2    LV RWT Ratio 0.39     LV Mass 2D 137.7 88 - 224 g    LV Mass 2D Index 64.1 49 - 115 g/m2    MV E/A 0.58     E/E' Ratio (Averaged) 9.91     E/E' Lateral 10.57     E/E' Septal 9.25     LA Volume Index BP 17 16 - 34 ml/m2    LVOT Stroke Volume Index 20.9 mL/m2    LA Volume Index 2C 18 16 - 34 mL/m2    LA Volume Index 4C 16 16 - 34 mL/m2    Ao Root Index 1.58 cm/m2    Ascending Aorta Index 1.67 cm/m2    AV Velocity Ratio 0.75     LVOT:AV VTI Index 0.72     LIZZY/BSA VTI 1.2 cm2/m2    LIZZY/BSA Peak Velocity 1.2 cm2/m2    MV:LVOT VTI Index 1.62     Est. RA Pressure 3 mmHg    RVSP 35 mmHg       I have personally reviewed imaging studies:  Other Studies:  Vascular duplex lower extremity venous bilateral   Final Result      CT CHEST PULMONARY EMBOLISM W CONTRAST   Final Result   Acute appearing occlusive bilateral pulmonary emboli with large clot burden and   findings suggestive of right heart strain.       [facility]   The critical results contained in this report were communicated to Dr. Zackery Gutierrez by   Dr. Vaibhav Zee over the phone at 3/8/2023 1:41 PM. Critical results were   communicated as outlined in Section II.C.2.a.i of the ACR Practice Guideline for   Communication of Diagnostic Imaging Findings.             IR TRANSCATH INFUSION THROMB NONCORONARY    (Results Pending)       Current Meds:  Current Facility-Administered Medications   Medication Dose Route Frequency    sodium chloride flush 0.9 % injection 5-40 mL  5-40 mL IntraVENous 2 times per day    sodium chloride flush 0.9 % injection 5-40 mL  5-40 mL IntraVENous PRN    0.9 % sodium chloride infusion   IntraVENous PRN    potassium chloride (KLOR-CON M) extended release tablet 40 mEq  40 mEq Oral PRN    Or    potassium bicarb-citric acid (EFFER-K) effervescent tablet 40 mEq  40 mEq Oral PRN    Or    potassium chloride 10 mEq/100 mL IVPB (Peripheral Line)  10 mEq IntraVENous PRN    potassium chloride 10 mEq/100 mL IVPB (Peripheral Line)  10 mEq IntraVENous PRN    magnesium sulfate 2000 mg in 50 mL IVPB premix  2,000 mg IntraVENous PRN    ondansetron (ZOFRAN-ODT) disintegrating tablet 4 mg  4 mg Oral Q8H PRN    Or    ondansetron (ZOFRAN) injection 4 mg  4 mg IntraVENous Q6H PRN    polyethylene glycol (GLYCOLAX) packet 17 g  17 g Oral Daily PRN    bisacodyl (DULCOLAX) suppository 10 mg  10 mg Rectal Daily PRN    famotidine (PEPCID) tablet 10 mg  10 mg Oral Daily PRN    aluminum & magnesium hydroxide-simethicone (MAALOX) 200-200-20 MG/5ML suspension 30 mL  30 mL Oral Q6H PRN    acetaminophen (TYLENOL) tablet 650 mg  650 mg Oral Q6H PRN    Or    acetaminophen (TYLENOL) suppository 650 mg  650 mg Rectal Q6H PRN    atorvastatin (LIPITOR) tablet 80 mg  80 mg Oral Nightly    metoprolol succinate (TOPROL XL) extended release tablet 12.5 mg  12.5 mg Oral BID    traMADol (ULTRAM) tablet 50 mg  50 mg Oral Q6H PRN    ipratropium (ATROVENT) 0.02 % nebulizer solution 0.5 mg  0.5 mg Nebulization TID    budesonide (PULMICORT) nebulizer suspension 500 mcg  0.5 mg Nebulization BID    albuterol (PROVENTIL) nebulizer solution 2.5 mg  2.5 mg Nebulization TID       Signed:  Dilshad Causey MD    Part of this note may have been written by using a voice dictation software. The note has been proof read but may still contain some grammatical/other typographical errors.

## 2023-03-09 NOTE — CARE COORDINATION
Case Management Assessment  Initial Evaluation    Date/Time of Evaluation: 3/9/2023 4:28 PM  Assessment Completed by: Maude Choe RN    If patient is discharged prior to next notation, then this note serves as note for discharge by case management. Patient Name: Ragini Barahona                   YOB: 1955  Diagnosis: Bilateral pulmonary embolism ([address] [I26.99]  Acute respiratory failure with hypoxia ([address] [J96.01]  Other acute pulmonary embolism with acute cor pulmonale ([address] [I26.09]                   Date / Time: 3/8/2023  1:04 PM    Patient Admission Status: Inpatient   Readmission Risk (Low < 19, Mod (19-27), High > 27): Readmission Risk Score: 15.1    Current PCP: Shine Meztger MD  PCP verified by CM? (P) Yes    Chart Reviewed: Yes      History Provided by: (P) Medical Record  Patient Orientation: (P) Alert and Oriented    Patient Cognition: (P) Alert    Hospitalization in the last 30 days (Readmission):  No    If yes, Readmission Assessment in CM Navigator will be completed.     Advance Directives:      Code Status: Full Code   Patient's Primary Decision Maker is: (P) Legal Next of Kin    Primary Decision Maker: Yumiko Lentz  - Spouse - [phone]    Discharge Planning:    Patient lives with: (P) Spouse/Significant Other Type of Home: (P) House  Primary Care Giver: (P) Self  Patient Support Systems include: (P) Spouse/Significant Other, Family Members, Children   Current Financial resources: (P) Medicare ([facility])  Current community resources: (P)  (none)  Current services prior to admission: (P) None            Current DME:              Type of Home Care services:  none       ADLS  Prior functional level: (P) Independent in ADLs/IADLs  Current functional level: (P) Other (see comment)    PT AM-PAC:   /24  OT AM-PAC:   /24    Family can provide assistance at DC: (P) Other (comment)  Would you like Case Management to discuss the discharge plan with any other family members/significant others, and if so, who? (P) Yes  Plans to Return to Present Housing: (P) Yes  Other Identified Issues/Barriers to RETURNING to current housing: pending d/c needs  Potential Assistance needed at discharge:  pending            Potential DME:  pending  Patient expects to discharge to: (P) [facility] for transportation at discharge: (P) Family    Financial    Payor: Jung Yepez / Plan: [facility] HMO / Product Type: *No Product type* /     Does insurance require precert for SNF: Yes    Potential assistance Purchasing Medications:  no  Meds-to-Beds request:  n/a      [facility] [facility] [address], [address] [facility]. [address] - P [phone] - F [phone]  [address] [place] [address]. [address]  [facility] [address]  Phone: [phone] Fax: [phone] [address] [facility] Mail Delivery - [facility] [address] [place] [address]  [address] [facility] [address]  Phone: [phone] Fax: [phone]      Notes:    Factors facilitating achievement of predicted outcomes: Family support    Barriers to discharge: pending medical treatment    Additional Case Management Notes: Chart reviewed s/p admission ICU for Bilateral PE and IR following with Oncology/Hematology. Pt has PCP and insurance. No needs identified at present for d/c home.  CM to follow for any needs per MD .

## 2023-03-09 NOTE — PROGRESS NOTES
Interventional Radiology Inpatient Communication    Interventional Radiology has received a consult for DVT Thrombectomy. Any diagnostic labs to be performed on collected specimen must be entered into [facility] prior to transport to Interventional Radiology. We anticipate this procedure will be completed on 03/10/23. Primary Care Nurse:    Please ensure the following is completed:    Patient is NPO: yes    Blood thinners held: no    Patient must have working IV: yes    Patient must be in a hospital gown with snaps and be transported via stretcher to Interventional Radiology. Please send hospital chart and labels. If the patient is unable to provide consent for the procedure, please contact Interventional Radiology at [phone].

## 2023-03-09 NOTE — INTERDISCIPLINARY ROUNDS
Interdisciplinary team rounds were held 3/9/2023 with the following team members:Care Management, Nursing, Nurse Practitioner, Pastoral Care, Pharmacy, Physician, Respiratory Therapy, and Clinical Coordinator and the patient. Plan of care discussed. See clinical pathway and/or care plan for interventions and desired outcomes.

## 2023-03-09 NOTE — ACP (ADVANCE CARE PLANNING)
Advance Care Planning     General Advance Care Planning (ACP) Conversation    Date of Conversation: 3/9/2023    Healthcare Decision Maker:    Primary Decision Maker: Raji Combs - [place] - [phone]  Click here to complete Healthcare Decision Makers including selection of the Healthcare Decision Maker Relationship (ie \"Primary\"). Today we documented Decision Maker(s) consistent with Legal Next of Kin hierarchy. Content/Action Overview:  No LW/HCPOA on file. Spouse legal NOK unless document presented stating otherwise. Full code per MD orders.      Length of Voluntary ACP Conversation in minutes:  <16 minutes (Non-Billable)    Janelle Huang RN

## 2023-03-09 NOTE — PROGRESS NOTES
Bedside and verbal shift change report received from  [facility] (offgoing nurse). Report included the following information SBAR, Kardex, Procedure Summary, Intake/Output, MAR, Recent Results, and Cardiac Rhythm SR/ST .      Dual skin assessment completed at bedside: RLE dallin swollen, LLE Tibial protuberance, R IJ sheath (list pertinent skin assessment findings)    Dual verification of gtts completed (name of gtts verified): heparin

## 2023-03-09 NOTE — PROGRESS NOTES
Does have extensive clot in Right lower extremity. Will have staff let IR know incase they need to also assess. Oncology note reviewed and to get Eliquis in the future. Also with narrow airway and obesity at risk for ALYSSA. If agreeable consider Split Night Sleep Study in the future. Wilmer Zapata MD    Official report: On the right, there is occlusive thrombus within the distal SFV, popliteal vein,  and peroneal veins. There is nonocclusive thrombus within the posterior tibial  vein.      IMPRESSIONS: Extensive DVT within the right lower extremity

## 2023-03-09 NOTE — PLAN OF CARE
Problem: Discharge Planning  Goal: Discharge to home or other facility with appropriate resources  3/9/2023 0920 by Marcella Morris RN  Outcome: Progressing  3/9/2023 0920 by Marcella Morris RN  Outcome: Progressing     Problem: Safety - Adult  Goal: Free from fall injury  3/9/2023 0920 by Marcella Morris RN  Outcome: Progressing  3/9/2023 0920 by Marcella Morris RN  Outcome: Not Progressing     Problem: Skin/Tissue Integrity  Goal: Absence of new skin breakdown  Description: 1. Monitor for areas of redness and/or skin breakdown  2. Assess vascular access sites hourly  3. Every 4-6 hours minimum:  Change oxygen saturation probe site  4. Every 4-6 hours:  If on nasal continuous positive airway pressure, respiratory therapy assess nares and determine need for appliance change or resting period.   3/9/2023 0920 by Marcella Morris RN  Outcome: Progressing  3/9/2023 0920 by Marcella Morris RN  Outcome: Progressing     Problem: Neurosensory - Adult  Goal: Achieves stable or improved neurological status  3/9/2023 0920 by Marcella Morris RN  Outcome: Progressing  3/9/2023 0920 by Marcella Morris RN  Outcome: Progressing  Goal: Absence of seizures  3/9/2023 0920 by Marcella Morris RN  Outcome: Progressing  3/9/2023 0920 by Marcella Morris RN  Outcome: Progressing  Goal: Remains free of injury related to seizures activity  3/9/2023 0920 by Marcella Morris RN  Outcome: Progressing  3/9/2023 0920 by Marcella Morris RN  Outcome: Progressing  Goal: Achieves maximal functionality and self care  3/9/2023 0920 by Marcella Morris RN  Outcome: Progressing  3/9/2023 0920 by Marcella Morris RN  Outcome: Progressing     Problem: Respiratory - Adult  Goal: Achieves optimal ventilation and oxygenation  3/9/2023 0920 by Marcella Morris RN  Outcome: Progressing  3/9/2023 0920 by Marcella Morris RN  Outcome: Progressing     Problem: Cardiovascular - Adult  Goal: Maintains optimal cardiac output and hemodynamic stability  3/9/2023 0920 by Linda Pedraza, RN  Outcome: Progressing  3/9/2023 0920 by Linda Pedraza RN  Outcome: Progressing  Goal: Absence of cardiac dysrhythmias or at baseline  3/9/2023 0920 by Linda Pedraza, RN  Outcome: Progressing  3/9/2023 0920 by Linda Pedraza RN  Outcome: Progressing     Problem: Skin/Tissue Integrity - Adult  Goal: Skin integrity remains intact  3/9/2023 0920 by Linda Pedraza, RN  Outcome: Progressing  3/9/2023 0920 by Linda Pedraza RN  Outcome: Progressing  Goal: Incisions, wounds, or drain sites healing without S/S of infection  3/9/2023 0920 by Linda Pedraza, RN  Outcome: Progressing  3/9/2023 0920 by Linda Pedraza RN  Outcome: Progressing  Goal: Oral mucous membranes remain intact  3/9/2023 0920 by Linda Pedraza, RN  Outcome: Progressing  3/9/2023 0920 by Linda Pedraza RN  Outcome: Progressing     Problem: Musculoskeletal - Adult  Goal: Return mobility to safest level of function  3/9/2023 0920 by Linda Pedraza, RN  Outcome: Progressing  3/9/2023 0920 by Linda Pedraza RN  Outcome: Progressing  Goal: Maintain proper alignment of affected body part  3/9/2023 0920 by Linda Pedraza, RN  Outcome: Progressing  3/9/2023 0920 by Linda Pedraza RN  Outcome: Progressing  Goal: Return ADL status to a safe level of function  3/9/2023 0920 by Linda Pedraza, RN  Outcome: Progressing  3/9/2023 0920 by Linda Pedraza RN  Outcome: Progressing     Problem: Gastrointestinal - Adult  Goal: Minimal or absence of nausea and vomiting  3/9/2023 0920 by Linda Pedraza, RN  Outcome: Progressing  3/9/2023 0920 by Linda Pedraza RN  Outcome: Progressing  Goal: Maintains or returns to baseline bowel function  3/9/2023 0920 by Linda Pedraza RN  Outcome: Progressing  3/9/2023 0920 by Linda Pedraza RN  Outcome: Progressing  Goal: Maintains adequate nutritional intake  3/9/2023 0920 by Linda Pedraza RN  Outcome: Progressing  3/9/2023 0920 by Linda Pedraza RN  Outcome: Progressing     Problem: Genitourinary - Adult  Goal: Absence of urinary retention  3/9/2023 0920 by Kelley Joy RN  Outcome: Progressing  3/9/2023 0920 by Kelley Joy RN  Outcome: Progressing     Problem: Infection - Adult  Goal: Absence of infection at discharge  3/9/2023 0920 by Kelley Joy RN  Outcome: Progressing  3/9/2023 0920 by Kelley Joy RN  Outcome: Progressing  Goal: Absence of infection during hospitalization  3/9/2023 0920 by Kelley Joy RN  Outcome: Progressing  3/9/2023 0920 by Kelley Joy RN  Outcome: Progressing  Goal: Absence of fever/infection during anticipated neutropenic period  3/9/2023 0920 by Kelley Joy RN  Outcome: Progressing  3/9/2023 0920 by Kelley Joy RN  Outcome: Progressing     Problem: Metabolic/Fluid and Electrolytes - Adult  Goal: Electrolytes maintained within normal limits  3/9/2023 0920 by Kelley Joy RN  Outcome: Progressing  3/9/2023 0920 by Kelley Joy RN  Outcome: Progressing  Goal: Hemodynamic stability and optimal renal function maintained  3/9/2023 0920 by Kelley Joy RN  Outcome: Progressing  3/9/2023 0920 by Kelley Joy RN  Outcome: Progressing  Goal: Glucose maintained within prescribed range  3/9/2023 0920 by Kelley Joy RN  Outcome: Progressing  3/9/2023 0920 by Kelley Joy RN  Outcome: Progressing     Problem: Hematologic - Adult  Goal: Maintains hematologic stability  3/9/2023 0920 by Kelley Joy RN  Outcome: Progressing  3/9/2023 0920 by Kelley Joy RN  Outcome: Progressing

## 2023-03-09 NOTE — PROGRESS NOTES
Initial visit made to patient and a prayer was provided.  tomas attention to the blue streamer with the  on-call telephone number. He was alert and attentive during the visit. His expressed his appreciation for the visit.         Juan Ramon [place], [address] [facility], [facility]

## 2023-03-09 NOTE — CONSULTS
on [facility] Hematology and Oncology: Inpatient Hematology / Oncology Consult Note    Reason for Consult:    Referring Physician:  Anamika Garcia MD    History of Present Illness:  Mr. Timi Laguerre is a 75 y.o. male admitted on 3/8/2023 with a primary diagnosis of acute PE and non-compliance with AC. PMhx: PAD, HTN, CAD, HLD, DVT/bilateral pulm emboli s/p thrombectomy in 11/2022 on Eliquis. He came to ED with GUERRERO and LE edema. No CP. O2 in 80s on RA. CT chest w acute PEs with large clot burden and findings suggestive of RV strain. Bilateral LE doppler US with extensive DVT in right LE. S/p PE lysis by Dr Masoud Ortiz on 3/8. Per RN at bedside possibly going for further intervention later today. Pt admits to not taking his ELiquis as prescribed. He took it sometimes and sometimes he took ASA. He did not have issues with coverage, just decided to take the pills differently on his own due to easy bruising. He continued on testosterone despite contraindication previously discussed. Review of Systems:  Constitutional Denies fever or chills. Denies weight loss or appetite changes. Denies fatigue. Denies night sweats. HEENT Denies trauma, blurry vision, hearing loss, ear pain, nosebleeds, sore throat, neck pain and ear discharge. Skin Denies lesions or rashes. Lungs + dyspnea, no cough, sputum production or hemoptysis. Cardiovascular Denies chest pain, palpitations, + lower extremity edema. Gastrointestinal Denies nausea or vomiting. Denies changes in bowel habits. Denies bloody or black stools. Denies abdominal pain.  Denies dysuria, frequency or hesitancy of urination. Neuro Denies headaches, visual changes or ataxia. Denies dizziness, tingling, tremors, sensory change, speech change, focal weakness    Hematology + easy bruising or bleeding, denies gingival bleeding or epistaxis. Endo Denies heat/cold intolerance   MSK Denies back pain, arthralgias, myalgias or frequent falls. Psychiatric/Behavioral Denies depression and substance abuse. The patient is not nervous/anxious. Allergies   Allergen Reactions    Methocarbamol Hives    Ciprofloxacin Nausea And Vomiting     Past Medical History:   Diagnosis Date    Arthritis     Asthma     Calculus of kidney     Hypercholesterolemia     Hypertension      Past Surgical History:   Procedure Laterality Date    HEENT Bilateral     cataracts    [facility]  2022    [facility] 2022 [facility]    ORTHOPEDIC SURGERY      rotator cuff ten years ago     Family History   Problem Relation Age of Onset    Asthma Mother     Thyroid Disease Mother     COPD Mother     Heart Disease Father     Hypertension Father     Cancer Neg Hx     Deep Vein Thrombosis Neg Hx      Social History     Socioeconomic History    Marital status:      Spouse name: Not on file    Number of children: Not on file    Years of education: Not on file    Highest education level: Not on file   Occupational History    Not on file   Tobacco Use    Smoking status: Former     Types: Cigars     Quit date: 2022     Years since quittin.3     Passive exposure: Current    Smokeless tobacco: Never   Vaping Use    Vaping Use: Never used   Substance and Sexual Activity    Alcohol use:  Yes     Alcohol/week: 6.0 standard drinks     Types: 6 Cans of beer per week    Drug use: No    Sexual activity: Not on file   Other Topics Concern    Not on file   Social History Narrative    Not on file     Social Determinants of Health     Financial Resource Strain: Low Risk     Difficulty of Paying Living Expenses: Not hard at all   Food Insecurity: No Food Insecurity    Worried About Running Out of Food in the Last Year: Never true    Ran Out of Food in the Last Year: Never true   Transportation Needs: Not on file   Physical Activity: Inactive    Days of Exercise per Week: 0 days    Minutes of Exercise per Session: 0 min   Stress: Not on file   Social Connections: Not on file   Intimate Partner Violence: Not on file   Housing Stability: Not on file     Current Facility-Administered Medications   Medication Dose Route Frequency Provider Last Rate Last Admin    sodium chloride flush 0.9 % injection 5-40 mL  5-40 mL IntraVENous 2 times per day Vera Hernandez MD        sodium chloride flush 0.9 % injection 5-40 mL  5-40 mL IntraVENous PRN Vera Hernandez MD        0.9 % sodium chloride infusion   IntraVENous PRN Vera Hernandez MD        potassium chloride (KLOR-CON M) extended release tablet 40 mEq  40 mEq Oral PRN Vera Hernandez MD        Or    potassium bicarb-citric acid (EFFER-K) effervescent tablet 40 mEq  40 mEq Oral PRN Vera Hernandez MD        Or    potassium chloride 10 mEq/100 mL IVPB (Peripheral Line)  10 mEq IntraVENous PRN Vera Hernandez MD        potassium chloride 10 mEq/100 mL IVPB (Peripheral Line)  10 mEq IntraVENous PRN Vera Hernandez MD        magnesium sulfate 2000 mg in 50 mL IVPB premix  2,000 mg IntraVENous PRN Vera Hernandez MD        ondansetron (ZOFRAN-ODT) disintegrating tablet 4 mg  4 mg Oral Q8H PRN Vera Hernandez MD        Or    ondansetron (ZOFRAN) injection 4 mg  4 mg IntraVENous Q6H PRN Vera Hernandez MD        polyethylene glycol (GLYCOLAX) packet 17 g  17 g Oral Daily PRN Vera Hernandez MD        bisacodyl (DULCOLAX) suppository 10 mg  10 mg Rectal Daily PRN Vera Hernandez MD        famotidine (PEPCID) tablet 10 mg  10 mg Oral Daily PRN Vera Hernandez MD        aluminum & magnesium hydroxide-simethicone (MAALOX) 200-200-20 MG/5ML suspension 30 mL  30 mL Oral Q6H PRN Vera Hernandez MD        acetaminophen (TYLENOL) tablet 650 mg  650 mg Oral Q6H PRN Vera Hernandez MD        Or    acetaminophen (TYLENOL) suppository 650 mg  650 mg Rectal Q6H PRN Vera Hernandez MD        atorvastatin (LIPITOR) tablet 80 mg  80 mg Oral Nightly Vera Hernandez MD        traMADol (ULTRAM) tablet 50 mg  50 mg Oral Q6H PRN Vera Hernandez MD        carvedilol (COREG) tablet 12.5 mg  12.5 mg Oral BID WC Romelia Crocker MD   12.5 mg at 23 1637    ipratropium (ATROVENT) 0.02 % nebulizer solution 0.5 mg  0.5 mg Nebulization TID Elías Woodward MD   0.5 mg at 23 1512    budesonide (PULMICORT) nebulizer suspension 500 mcg  0.5 mg Nebulization BID Elías Woodward MD   500 mcg at 23 0743    albuterol (PROVENTIL) nebulizer solution 2.5 mg  2.5 mg Nebulization TID Elías Woodward MD   2.5 mg at 23 1512       OBJECTIVE:  Patient Vitals for the past 8 hrs:   BP Temp Temp src Pulse Resp SpO2   23 1815 (!) 94/52 -- -- 98 (!) 33 92 %   23 1745 112/71 -- -- (!) 104 18 92 %   23 1715 127/76 -- -- (!) 112 21 92 %   23 1645 112/75 -- -- (!) 121 (!) 35 96 %   23 1615 113/70 -- -- (!) 112 23 92 %   23 1545 118/75 98.1 °F (36.7 °C) Oral (!) 112 29 (!) 80 %   23 1530 117/71 -- -- (!) 122 30 92 %   23 1515 133/76 -- -- 99 22 99 %   23 1512 -- -- -- 100 16 97 %   23 1500 134/79 -- -- (!) 104 -- 96 %   23 1445 134/86 -- -- (!) 101 -- 95 %   23 1430 139/80 -- -- 95 20 93 %   23 1415 (!) 142/80 -- -- (!) 103 23 95 %   23 1400 132/72 -- -- 98 21 96 %   23 1345 123/82 -- -- 99 (!) 44 97 %   23 1330 117/80 -- -- 96 28 98 %   23 1315 (!) 177/100 -- -- (!) 102 22 91 %   23 1300 (!) 141/74 -- -- 96 25 96 %   23 1245 (!) 143/85 -- -- 96 23 96 %   23 1230 132/78 -- -- 96 24 97 %   23 1215 114/82 -- -- 97 29 95 %   23 1200 109/74 -- -- 99 (!) 31 97 %   23 1145 137/86 -- -- 92 20 96 %   23 1130 130/82 98.8 °F (37.1 °C) Oral 93 21 96 %   23 1125 -- -- -- 94 17 96 %   23 1115 118/75 -- -- 92 22 96 %   23 1100 132/89 -- -- 98 29 95 %   23 1045 129/84 -- -- 95 15 94 %     Temp (24hrs), Av.2 °F (36.8 °C), Min:97.8 °F (36.6 °C), Max:98.8 °F (37.1 °C)     07 -  1900  In: 244.1 [I.V.:244.1]  Out: 1800 [Urine:1800]    Physical Exam:  Constitutional: Well developed, obese male in no acute distress, sitting comfortably in the hospital bed. HEENT: Oropharynx is clear, mucous membranes are moist.  Pupils are equal, round, and reactive to light. Extraocular muscles are intact. Sclerae anicteric. Lymph node   Deferred    Skin Warm and dry.  + bruising and no rash noted. No erythema. No pallor. Respiratory Lungs are clear to auscultation bilaterally without wheezes, rales or rhonchi, normal air exchange without accessory muscle use. CVS Normal rate, regular rhythm and normal S1 and S2   Abdomen Soft, nontender and nondistended, normoactive bowel sounds. Neuro Grossly nonfocal with no obvious sensory or motor deficits. MSK Normal range of motion in general.  No edema and no tenderness. Psych Appropriate mood and affect.       Labs:    Recent Results (from the past 24 hour(s))   Anti-Xa, Unfractionated Heparin    Collection Time: 03/08/23  7:59 PM   Result Value Ref Range    Anti-XA Unfrac Heparin 0.34 0.3 - 0.7 IU/mL   Anti-Xa, Unfractionated Heparin    Collection Time: 03/08/23 11:36 PM   Result Value Ref Range    Anti-XA Unfrac Heparin <0.10 (L) 0.3 - 0.7 IU/mL   CBC    Collection Time: 03/08/23 11:36 PM   Result Value Ref Range    WBC 12.2 (H) 4.3 - 11.1 K/uL    RBC 4.60 4.23 - 5.6 M/uL    Hemoglobin 14.5 13.6 - 17.2 g/dL    Hematocrit 45.0 41.1 - 50.3 %    MCV 97.8 82 - 102 FL    MCH 31.5 26.1 - 32.9 PG    MCHC 32.2 31.4 - 35.0 g/dL    RDW 14.0 11.9 - 14.6 %    Platelets 187 (L) 699 - 450 K/uL    MPV 10.9 9.4 - 12.3 FL    nRBC 0.00 0.0 - 0.2 K/uL   APTT    Collection Time: 03/08/23 11:36 PM   Result Value Ref Range    PTT 37.1 (H) 24.5 - 34.2 SEC   Fibrinogen    Collection Time: 03/08/23 11:36 PM   Result Value Ref Range    Fibrinogen 409 190 - 501 mg/dL   CBC    Collection Time: 03/09/23  5:54 AM   Result Value Ref Range    WBC 11.6 (H) 4.3 - 11.1 K/uL    RBC 4.45 4.23 - 5.6 M/uL    Hemoglobin 13.8 13.6 - 17.2 g/dL Hematocrit 44.2 41.1 - 50.3 %    MCV 99.3 82 - 102 FL    MCH 31.0 26.1 - 32.9 PG    MCHC 31.2 (L) 31.4 - 35.0 g/dL    RDW 14.0 11.9 - 14.6 %    Platelets 873 (L) 466 - 450 K/uL    MPV 10.8 9.4 - 12.3 FL    nRBC 0.00 0.0 - 0.2 K/uL   APTT    Collection Time: 03/09/23  5:54 AM   Result Value Ref Range    PTT 35.7 (H) 24.5 - 34.2 SEC   Fibrinogen    Collection Time: 03/09/23  5:54 AM   Result Value Ref Range    Fibrinogen 357 190 - 501 mg/dL   Protime-INR    Collection Time: 03/09/23  5:54 AM   Result Value Ref Range    Protime 14.7 (H) 12.6 - 14.3 sec    INR 1.1     Anti-Xa, Unfractionated Heparin    Collection Time: 03/09/23  5:54 AM   Result Value Ref Range    Anti-XA Unfrac Heparin <0.1 (L) 0.3 - 0.7 IU/mL   Transthoracic echocardiogram (TTE) complete with contrast, bubble, strain, and 3D PRN    Collection Time: 03/09/23  8:30 AM   Result Value Ref Range    Body Surface Area 2.22 m2    LV EDV A2C 77 mL    LV EDV A4C 73 mL    LV ESV A2C 41 mL    LV ESV A4C 39 mL    IVSd 0.9 0.6 - 1.0 cm    LVIDd 4.6 4.2 - 5.9 cm    LVIDs 3.7 cm    LVOT Diameter 2.1 cm    LVOT Mean Gradient 1 mmHg    LVOT VTI 13.0 cm    LVOT Peak Velocity 0.9 m/s    LVOT Peak Gradient 3 mmHg    LVPWd 0.9 0.6 - 1.0 cm    LV E' Lateral Velocity 7 cm/s    LV E' Septal Velocity 8 cm/s    LV Ejection Fraction A2C 46 %    LV Ejection Fraction A4C 47 %    EF BP 45 (A) 55 - 100 %    LVOT Area 3.5 cm2    LVOT SV 45.0 ml    LA Minor Axis 5.3 cm    LA Major [place] 5.4 cm    LA Area 2C 15.7 cm2    LA Area 4C 15.4 cm2    LA Volume 2C 38 18 - 58 mL    LA Volume 4C 34 18 - 58 mL    LA Volume BP 36 18 - 58 mL    AV Mean Velocity 0.8 m/s    AV Mean Gradient 3 mmHg    AV VTI 18.0 cm    AV Peak Velocity 1.2 m/s    AV Peak Gradient 6 mmHg    AV Area by VTI 2.5 cm2    AV Area by Peak Velocity 2.5 cm2    Aortic Root 3.4 cm    Ascending Aorta 3.6 cm    IVC Proxmal 1.9 cm    MV E Wave Deceleration Time 145.0 ms    MV A Velocity 1.27 m/s    MV E Velocity 0.74 m/s    MV Mean Gradient 4 mmHg    MV VTI 21.1 cm    MV Mean Velocity 0.9 m/s    MV Max Velocity 1.3 m/s    MV Peak Gradient 7 mmHg    MV Area by VTI 2.1 cm2    PV .0 ms    PV Max Velocity 0.9 m/s    PV Peak Gradient 3 mmHg    RV Basal Dimension 4.2 cm    RV Free Wall Peak S' 8 cm/s    TAPSE 1.9 1.7 cm    TR Max Velocity 2.85 m/s    TR Peak Gradient 32 mmHg    Fractional Shortening 2D 20 28 - 44 %    LV ESV Index A4C 18 mL/m2    LV EDV Index A4C 34 mL/m2    LV ESV Index A2C 19 mL/m2    LV EDV Index A2C 36 mL/m2    LVIDd Index 2.14 cm/m2    LVIDs Index 1.72 cm/m2    LV RWT Ratio 0.39     LV Mass 2D 137.7 88 - 224 g    LV Mass 2D Index 64.1 49 - 115 g/m2    MV E/A 0.58     E/E' Ratio (Averaged) 9.91     E/E' Lateral 10.57     E/E' Septal 9.25     LA Volume Index BP 17 16 - 34 ml/m2    LVOT Stroke Volume Index 20.9 mL/m2    LA Volume Index 2C 18 16 - 34 mL/m2    LA Volume Index 4C 16 16 - 34 mL/m2    Ao Root Index 1.58 cm/m2    Ascending Aorta Index 1.67 cm/m2    AV Velocity Ratio 0.75     LVOT:AV VTI Index 0.72     LIZZY/BSA VTI 1.2 cm2/m2    LIZZY/BSA Peak Velocity 1.2 cm2/m2    MV:LVOT VTI Index 1.62     Est. RA Pressure 3 mmHg    RVSP 35 mmHg   CBC    Collection Time: 03/09/23  4:23 PM   Result Value Ref Range    WBC 12.1 (H) 4.3 - 11.1 K/uL    RBC 4.39 4.23 - 5.6 M/uL    Hemoglobin 13.9 13.6 - 17.2 g/dL    Hematocrit 43.7 41.1 - 50.3 %    MCV 99.5 82 - 102 FL    MCH 31.7 26.1 - 32.9 PG    MCHC 31.8 31.4 - 35.0 g/dL    RDW 13.8 11.9 - 14.6 %    Platelets 98 (L) 664 - 450 K/uL    MPV 10.7 9.4 - 12.3 FL    nRBC 0.00 0.0 - 0.2 K/uL       Imaging:  Reviewed     ASSESSMENT:    Principal Problem:    Bilateral pulmonary embolism (HCC)  Active Problems:    Sinus tachycardia    Acute pulmonary embolism, unspecified pulmonary embolism type, unspecified whether acute cor pulmonale present (HCC)    Chronic combined systolic and diastolic congestive heart failure (HCC)    LUE weakness    Acute respiratory failure with hypoxia (HCC)    Pain in both lower extremities    PAD (peripheral artery disease) ([facility] [address]    Essential hypertension    Coronary artery disease involving native coronary artery of native heart without angina pectoris  Resolved Problems:    * No resolved hospital problems. *      PLAN / RECOMMENDATIONS:  Lab studies and imaging studies were reviewed. Pertinent old records were reviewed     Acute bilateral, recurrent PE/extensive DVT   - s/p lysis per IR   - has not been compliant with ELiquis - taking it sporadically - we discussed importance of AC compliance - he VU   - stated that he plans to stop testosterone inj       2. 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 TCP due to consumption, will increase gradually         Thank you for allowing me to participate in the care of Mr. Bebe Miller.         Geovany Marie MD  [facility]  [address] [place] [address]  Office : [phone]  Fax : [phone]

## 2023-03-09 NOTE — MANAGEMENT PLAN
[address] Hematology Oncology      Inpatient Hematology / Oncology Care Management Plan    Reason for Consult:  Bilateral pulmonary embolism [facility]) [I26.99]  Acute respiratory failure with hypoxia ([address] [J96.01]  Other acute pulmonary embolism with acute cor pulmonale ([address] [I26.09]  Referring Physician:  Bertrand Dalal MD    History of Present Illness:  Mr. Ava Nichole is a 75 y.o. male admitted on 3/8/2023 with a primary diagnosis of The primary encounter diagnosis was Other acute pulmonary embolism with acute cor pulmonale ([address] Diagnoses of Bilateral pulmonary embolism ([address] Acute respiratory failure with hypoxia ([address] and Leg swelling were also pertinent to this visit. Gina Veliz He has a history of PAD, HTN, CAD, HLD and DVT/bilateral pulmonary embolus status post thrombectomy in November 2022 on Eliquis. He presented to there ER after experiencing an acute episode of dyspnea with exertion. He denied chest pain. In the ER, he was found to be tachycardic and tachypneic with oxygen saturations in the emmett 80's on room air. CT of the chest revealed acute appearing occlusive bilateral pulmonary emboli with large clot burden and findings suggested of right hear strain. Bilateral lower extremity doppler studies with extensive DVT in the right leg. He is scheduled for thrombectomy today in . Of note, he is a patient of ours with a history of erythrocytosis on testosterone replacement. He has not been taking his Eliquis lately - takes a couple of times a week. He denies having issues with cost of medication - states he didn't like \"the bruises it left on his arm. \" On Michae Roller. Review of Systems:  Constitutional Denies fever, chills, weight loss, appetite changes, fatigue, night sweats. HEENT Denies trauma, blurry vision, hearing loss, ear pain, nosebleeds, sore throat, neck pain and ear discharge. Skin Denies lesions or rashes. Lungs Denies cough, sputum production or hemoptysis.  + dyspnea with exertion. Cardiovascular Denies chest pain, palpitations. + right lower extremity edema. Gastrointestinal Denies nausea, vomiting, changes in bowel habits, bloody or black stools, abdominal pain.  Denies dysuria, frequency or hesitancy of urination. Neuro Denies headaches, visual changes or ataxia. Denies dizziness, tingling, tremors, sensory change, speech change, focal weakness or headaches. Hematology Denies easy bruising or bleeding, denies gingival bleeding or epistaxis. + easy bruising. Endo Denies heat/cold intolerance, denies diabetes or thyroid abnormalities. MSK Denies back pain, arthralgias, myalgias or frequent falls. Psychiatric/Behavioral Denies depression and substance abuse. The patient is not nervous/anxious. Allergies   Allergen Reactions    Methocarbamol Hives    Ciprofloxacin Nausea And Vomiting     Past Medical History:   Diagnosis Date    Arthritis     Asthma     Calculus of kidney     Hypercholesterolemia     Hypertension      Past Surgical History:   Procedure Laterality Date    HEENT Bilateral     cataracts    [facility]  2022    [facility] 2022 [facility]    ORTHOPEDIC SURGERY      rotator cuff ten years ago     Family History   Problem Relation Age of Onset    Asthma Mother     Thyroid Disease Mother     COPD Mother     Heart Disease Father     Hypertension Father     Cancer Neg Hx     Deep Vein Thrombosis Neg Hx      Social History     Socioeconomic History    Marital status:      Spouse name: Not on file    Number of children: Not on file    Years of education: Not on file    Highest education level: Not on file   Occupational History    Not on file   Tobacco Use    Smoking status: Former     Types: Cigars     Quit date: 2022     Years since quittin.3     Passive exposure: Current    Smokeless tobacco: Never   Vaping Use    Vaping Use: Never used   Substance and Sexual Activity    Alcohol use:  Yes Alcohol/week: 6.0 standard drinks     Types: 6 Cans of beer per week    Drug use: No    Sexual activity: Not on file   Other Topics Concern    Not on file   Social History Narrative    Not on file     Social Determinants of Health     Financial Resource Strain: Low Risk     Difficulty of Paying Living Expenses: Not hard at all   Food Insecurity: No Food Insecurity    Worried About Running Out of Food in the Last Year: Never true    Ran Out of Food in the Last Year: Never true   Transportation Needs: Not on file   Physical Activity: Inactive    Days of Exercise per Week: 0 days    Minutes of Exercise per Session: 0 min   Stress: Not on file   Social Connections: Not on file   Intimate Partner Violence: Not on file   Housing Stability: Not on file     Current Facility-Administered Medications   Medication Dose Route Frequency Provider Last Rate Last Admin    heparin (porcine) injection 8,340 Units  80 Units/kg IntraVENous PRN Merribeth Estimable, DO        heparin (porcine) injection 4,170 Units  40 Units/kg IntraVENous PRN Merribeth Estimable, DO        alteplase (CATHFLO) 10 mg in sodium chloride 0.9 % 500 mL infusion  0.5 mg/hr IntraCATHeter Continuous Omelia MD Nata   Stopped at 03/09/23 1102    alteplase (CATHFLO) 10 mg in sodium chloride 0.9 % 500 mL infusion  0.5 mg/hr IntraCATHeter Continuous Omelia MD Nata   Stopped at 03/09/23 1102    heparin 25,000 units in dextrose 5% 250 mL (premix) infusion  400 Units/hr IntraVENous Continuous Jacquie Peace MD   Stopped at 03/09/23 1102    ipratropium (ATROVENT) 0.02 % nebulizer solution 0.5 mg  0.5 mg Nebulization TID Campbell Hernandez MD   0.5 mg at 03/09/23 0743    budesonide (PULMICORT) nebulizer suspension 500 mcg  0.5 mg Nebulization BID Campbell Hernandez MD   500 mcg at 03/09/23 0743    albuterol (PROVENTIL) nebulizer solution 2.5 mg  2.5 mg Nebulization TID Campbell Hernandez MD   2.5 mg at 03/09/23 0743       OBJECTIVE:  Patient Vitals for the past 8 hrs:   BP Temp Temp src Pulse Resp SpO2   23 1145 137/86 -- -- 92 20 96 %   23 1130 130/82 98.8 °F (37.1 °C) Oral 93 21 96 %   23 1125 -- -- -- 94 17 96 %   23 1115 118/75 -- -- 92 22 96 %   23 1100 132/89 -- -- 98 29 95 %   23 1045 129/84 -- -- 95 15 94 %   23 1030 138/86 -- -- 93 30 95 %   23 1015 136/88 -- -- 92 18 93 %   23 1000 138/82 -- -- 93 20 94 %   23 0945 (!) 151/76 -- -- 92 20 94 %   23 0930 (!) 147/87 -- -- 98 (!) 63 93 %   23 0915 (!) 126/90 -- -- 94 20 94 %   23 0900 (!) 138/92 -- -- 96 27 94 %   23 0845 139/87 -- -- (!) 102 (!) 37 93 %   23 0830 (!) 148/85 98.3 °F (36.8 °C) Oral 95 21 93 %   23 0815 137/85 -- -- 94 23 93 %   23 0800 132/85 -- -- 95 20 95 %   23 0745 123/81 -- -- 91 16 95 %   23 0744 -- -- -- 92 19 95 %   23 0743 -- -- -- 92 20 95 %   23 0730 139/77 -- -- 94 24 96 %   23 0715 139/88 -- -- 94 27 95 %   23 0700 138/84 -- -- 93 23 96 %   23 0645 (!) 141/90 -- -- 93 22 96 %   23 0630 128/89 -- -- 96 19 96 %   23 0615 (!) 131/90 -- -- 100 (!) 34 98 %   23 0600 139/82 -- -- 96 17 96 %   23 0545 (!) 151/94 -- -- 95 18 95 %     Temp (24hrs), Av.1 °F (36.7 °C), Min:97.5 °F (36.4 °C), Max:98.8 °F (37.1 °C)     07 -  1900  In: -   Out: 1200 [Urine:1200]    Physical Exam:  Constitutional: Well developed, well nourished male in no acute distress, sitting comfortably in the hospital bed. HEENT: Normocephalic and atraumatic. Oropharynx is clear, mucous membranes are moist.   Extraocular muscles are intact. Sclerae anicteric. Neck supple. Skin Warm and dry. No bruising and no rash noted. No erythema. No pallor. Respiratory Lungs are clear to auscultation bilaterally without wheezes, rales or rhonchi, normal air exchange without accessory muscle use. CVS Normal rate, regular rhythm and normal S1 and S2.   No murmurs, gallops, or rubs. Abdomen Soft, nontender and nondistended, normoactive bowel sounds. Neuro Grossly nonfocal with no obvious sensory or motor deficits. MSK Normal range of motion in general.  No edema and no tenderness. Psych Appropriate mood and affect.         Labs:    Recent Results (from the past 24 hour(s))   Arterial Blood Gas, POC    Collection Time: 03/08/23  1:37 PM   Result Value Ref Range    DEVICE NASAL CANNULA      pH, Arterial, POC 7.45 7.35 - 7.45      pCO2, Arterial, POC 33.7 (L) 35 - 45 MMHG    pO2, Arterial, POC 58 (L) 75 - 100 MMHG    HCO3, Mixed 23.5 22 - 26 MMOL/L    SO2c, Arterial, POC 91.3 (L) 95 - 98 %    Base Excess 0.4 mmol/L    POC Kushal's Test Positive      Site RIGHT RADIAL      Specimen type: ARTERIAL      Performed by: Amber     POC TCO2 22 13 - 23 MMOL/L    FIO2 10     APTT    Collection Time: 03/08/23  6:16 PM   Result Value Ref Range    PTT 99.5 (H) 24.5 - 34.2 SEC   CBC    Collection Time: 03/08/23  6:16 PM   Result Value Ref Range    WBC 14.4 (H) 4.3 - 11.1 K/uL    RBC 5.05 4.23 - 5.6 M/uL    Hemoglobin 15.9 13.6 - 17.2 g/dL    Hematocrit 50.1 41.1 - 50.3 %    MCV 99.2 82 - 102 FL    MCH 31.5 26.1 - 32.9 PG    MCHC 31.7 31.4 - 35.0 g/dL    RDW 14.0 11.9 - 14.6 %    Platelets 296 (L) 260 - 450 K/uL    MPV 10.7 9.4 - 12.3 FL    nRBC 0.00 0.0 - 0.2 K/uL   Protime-INR    Collection Time: 03/08/23  6:16 PM   Result Value Ref Range    Protime 14.7 (H) 12.6 - 14.3 sec    INR 1.1     Anti-Xa, Unfractionated Heparin    Collection Time: 03/08/23  6:16 PM   Result Value Ref Range    Anti-XA Unfrac Heparin 0.68 0.3 - 0.7 IU/mL   Culture, Blood 1    Collection Time: 03/08/23  6:19 PM    Specimen: Blood   Result Value Ref Range    Special Requests LEFT  HAND        Culture NO GROWTH AFTER 11 HOURS     Anti-Xa, Unfractionated Heparin    Collection Time: 03/08/23  7:59 PM   Result Value Ref Range    Anti-XA Unfrac Heparin 0.34 0.3 - 0.7 IU/mL   Anti-Xa, Unfractionated Heparin    Collection Time: 03/08/23 11:36 PM   Result Value Ref Range    Anti-XA Unfrac Heparin <0.10 (L) 0.3 - 0.7 IU/mL   CBC    Collection Time: 03/08/23 11:36 PM   Result Value Ref Range    WBC 12.2 (H) 4.3 - 11.1 K/uL    RBC 4.60 4.23 - 5.6 M/uL    Hemoglobin 14.5 13.6 - 17.2 g/dL    Hematocrit 45.0 41.1 - 50.3 %    MCV 97.8 82 - 102 FL    MCH 31.5 26.1 - 32.9 PG    MCHC 32.2 31.4 - 35.0 g/dL    RDW 14.0 11.9 - 14.6 %    Platelets 437 (L) 327 - 450 K/uL    MPV 10.9 9.4 - 12.3 FL    nRBC 0.00 0.0 - 0.2 K/uL   APTT    Collection Time: 03/08/23 11:36 PM   Result Value Ref Range    PTT 37.1 (H) 24.5 - 34.2 SEC   Fibrinogen    Collection Time: 03/08/23 11:36 PM   Result Value Ref Range    Fibrinogen 409 190 - 501 mg/dL   CBC    Collection Time: 03/09/23  5:54 AM   Result Value Ref Range    WBC 11.6 (H) 4.3 - 11.1 K/uL    RBC 4.45 4.23 - 5.6 M/uL    Hemoglobin 13.8 13.6 - 17.2 g/dL    Hematocrit 44.2 41.1 - 50.3 %    MCV 99.3 82 - 102 FL    MCH 31.0 26.1 - 32.9 PG    MCHC 31.2 (L) 31.4 - 35.0 g/dL    RDW 14.0 11.9 - 14.6 %    Platelets 621 (L) 923 - 450 K/uL    MPV 10.8 9.4 - 12.3 FL    nRBC 0.00 0.0 - 0.2 K/uL   APTT    Collection Time: 03/09/23  5:54 AM   Result Value Ref Range    PTT 35.7 (H) 24.5 - 34.2 SEC   Fibrinogen    Collection Time: 03/09/23  5:54 AM   Result Value Ref Range    Fibrinogen 357 190 - 501 mg/dL   Protime-INR    Collection Time: 03/09/23  5:54 AM   Result Value Ref Range    Protime 14.7 (H) 12.6 - 14.3 sec    INR 1.1     Anti-Xa, Unfractionated Heparin    Collection Time: 03/09/23  5:54 AM   Result Value Ref Range    Anti-XA Unfrac Heparin <0.1 (L) 0.3 - 0.7 IU/mL   Transthoracic echocardiogram (TTE) complete with contrast, bubble, strain, and 3D PRN    Collection Time: 03/09/23  8:30 AM   Result Value Ref Range    Body Surface Area 2.22 m2    LV EDV A2C 77 mL    LV EDV A4C 73 mL    LV ESV A2C 41 mL    LV ESV A4C 39 mL    IVSd 0.9 0.6 - 1.0 cm    LVIDd 4.6 4.2 - 5.9 cm    LVIDs 3.7 cm    LVOT Diameter 2.1 cm    LVOT Mean Gradient 1 mmHg    LVOT VTI 13.0 cm    LVOT Peak Velocity 0.9 m/s    LVOT Peak Gradient 3 mmHg    LVPWd 0.9 0.6 - 1.0 cm    LV E' Lateral Velocity 7 cm/s    LV E' Septal Velocity 8 cm/s    LV Ejection Fraction A2C 46 %    LV Ejection Fraction A4C 47 %    EF BP 45 (A) 55 - 100 %    LVOT Area 3.5 cm2    LVOT SV 45.0 ml    LA Minor Axis 5.3 cm    LA Major [place] 5.4 cm    LA Area 2C 15.7 cm2    LA Area 4C 15.4 cm2    LA Volume 2C 38 18 - 58 mL    LA Volume 4C 34 18 - 58 mL    LA Volume BP 36 18 - 58 mL    AV Mean Velocity 0.8 m/s    AV Mean Gradient 3 mmHg    AV VTI 18.0 cm    AV Peak Velocity 1.2 m/s    AV Peak Gradient 6 mmHg    AV Area by VTI 2.5 cm2    AV Area by Peak Velocity 2.5 cm2    Aortic Root 3.4 cm    Ascending Aorta 3.6 cm    IVC Proxmal 1.9 cm    MV E Wave Deceleration Time 145.0 ms    MV A Velocity 1.27 m/s    MV E Velocity 0.74 m/s    MV Mean Gradient 4 mmHg    MV VTI 21.1 cm    MV Mean Velocity 0.9 m/s    MV Max Velocity 1.3 m/s    MV Peak Gradient 7 mmHg    MV Area by VTI 2.1 cm2    PV .0 ms    PV Max Velocity 0.9 m/s    PV Peak Gradient 3 mmHg    RV Basal Dimension 4.2 cm    RV Free Wall Peak S' 8 cm/s    TAPSE 1.9 1.7 cm    TR Max Velocity 2.85 m/s    TR Peak Gradient 32 mmHg    Fractional Shortening 2D 20 28 - 44 %    LV ESV Index A4C 18 mL/m2    LV EDV Index A4C 34 mL/m2    LV ESV Index A2C 19 mL/m2    LV EDV Index A2C 36 mL/m2    LVIDd Index 2.14 cm/m2    LVIDs Index 1.72 cm/m2    LV RWT Ratio 0.39     LV Mass 2D 137.7 88 - 224 g    LV Mass 2D Index 64.1 49 - 115 g/m2    MV E/A 0.58     E/E' Ratio (Averaged) 9.91     E/E' Lateral 10.57     E/E' Septal 9.25     LA Volume Index BP 17 16 - 34 ml/m2    LVOT Stroke Volume Index 20.9 mL/m2    LA Volume Index 2C 18 16 - 34 mL/m2    LA Volume Index 4C 16 16 - 34 mL/m2    Ao Root Index 1.58 cm/m2    Ascending Aorta Index 1.67 cm/m2    AV Velocity Ratio 0.75     LVOT:AV VTI Index 0.72     LIZZY/BSA VTI 1.2 cm2/m2    LIZZY/BSA Peak Velocity 1.2 cm2/m2    MV:LVOT VTI Index 1.62     Est. RA Pressure 3 mmHg    RVSP 35 mmHg       Imaging:  CT Result (most recent):  CT CHEST PULMONARY EMBOLISM W CONTRAST 03/08/2023    Narrative  EXAM: CT Chest with contrast.  INDICATION: hx pe and hypoxia  Comparison: Chest radiograph, 11/11/2022. CT chest, 11/7/2022    Multiple axial images were obtained through the chest during intravenous  infusion of 100mL of Isovue 370. Coronal and sagittal reformats were performed. Radiation dose reduction techniques were used for this study: All CT scans  performed at this facility use one or all of the following: Automated exposure  control, adjustment of the mA and/or kVp according to patient's size, iterative  reconstruction. FINDINGS:  Examination is diagnostic through the subsegmental level. There are acute  bilateral occlusive pulmonary emboli extending from the distal main pulmonary  arteries into the lobar, segmental, and subsegmental branches throughout all 5  lobes. The appearance is very similar to November 2022, though the size and  occlusive appearance suggests this is a new acute embolic event. The patient is  status post thrombectomy in November 2022 further supporting acute embolic  event. The great vessels are normal in caliber with atherosclerotic calcifications in  the aorta, branch vessels, and coronary arteries. LUNGS/PLEURA: The central airways are patent. No focal lung consolidation. No  pleural effusion or pneumothorax. MEDIASTINUM: The heart is normal in size, though the right ventricle is dilated  with flattening of the interventricular septum concerning for right heart  strain. No pericardial effusion. No mediastinal or hilar lymphadenopathy. The  is less thyroid is normal.  Thoracic esophagus is unremarkable. BONES/SUBCUTANEOUS TISSUE: No acute or aggressive osseous abnormality. Regional  soft tissues are unremarkable.  No axillary lymphadenopathy    UPPER ABDOMEN: Nonobstructing calculus in the left kidney. Right renal cyst  measuring 2.5 cm. Impression  Acute appearing occlusive bilateral pulmonary emboli with large clot burden and  findings suggestive of right heart strain. [facility]  The critical results contained in this report were communicated to Dr. Camelia Amos by  Dr. Marissa Dillon over the phone at 3/8/2023 1:41 PM. Critical results were  communicated as outlined in Section II.C.2.a.i of the ACR Practice Guideline for  Communication of Diagnostic Imaging Findings. ASSESSMENT:    ICD-10-CM    1. Other acute pulmonary embolism with acute cor pulmonale (HCC)  I26.09       2. Bilateral pulmonary embolism (HCC)  I26.99 Vascular duplex lower extremity venous bilateral     Vascular duplex lower extremity venous bilateral     CANCELED: Vascular duplex lower extremity venous bilateral     CANCELED: Vascular duplex lower extremity venous bilateral      3. Acute respiratory failure with hypoxia ([facility])  J96.01 Transthoracic echocardiogram (TTE) complete with contrast, bubble, strain, and 3D PRN     Transthoracic echocardiogram (TTE) complete with contrast, bubble, strain, and 3D PRN      4. Leg swelling  M79.89               RECOMMENDATIONS:  Acute Bilateral Pulmonary Embolism/Extensive RLE DVT  - for TPA in IR. On heparin drip. - Discussed compliance with Eliquis and he agrees - no issues with obtaining Rx  - Agrees to stop testosterone injections     Acute Respiratory Failure  - per pulmonary  - on Airvo    Erythrocytosis   - most likely related to testosterone supplement  - more coaguable on injections, will stop  - Hgb on arrival 18.3, 13.8 today. Thrombocytopenia  - Normal on arrival at 160,000. Down to 104k most likely related to consumption. Lab studies and imaging studies were personally reviewed. Pertinent old records were reviewed. Thank you for allowing us to participate in the care of Mr. Jessica Dueñas.  He has a follow up with Dr. Miguelangel Thomas on 4/24 and he needs to keep this apt.        JOCELYN Jones   [address] Hematology Oncology  [address] [place] [address] Avenue  Office : [phone]  Fax : [phone]

## 2023-03-10 ENCOUNTER — ANESTHESIA (OUTPATIENT)
Dept: INTERVENTIONAL RADIOLOGY/VASCULAR | Age: 68
End: 2023-03-10
Payer: MEDICARE

## 2023-03-10 ENCOUNTER — HOSPITAL ENCOUNTER (INPATIENT)
Dept: INTERVENTIONAL RADIOLOGY/VASCULAR | Age: 68
DRG: 270 | End: 2023-03-10
Payer: MEDICARE

## 2023-03-10 VITALS
DIASTOLIC BLOOD PRESSURE: 68 MMHG | BODY MASS INDEX: 36.1 KG/M2 | RESPIRATION RATE: 20 BRPM | TEMPERATURE: 98.3 F | HEIGHT: 67 IN | HEART RATE: 80 BPM | OXYGEN SATURATION: 91 % | SYSTOLIC BLOOD PRESSURE: 111 MMHG | WEIGHT: 230 LBS

## 2023-03-10 LAB
ANION GAP SERPL CALC-SCNC: 3 MMOL/L (ref 2–11)
APTT PPP: 180.2 SEC (ref 24.5–34.2)
APTT PPP: 31.7 SEC (ref 24.5–34.2)
BASOPHILS # BLD: 0 K/UL (ref 0–0.2)
BASOPHILS NFR BLD: 0 % (ref 0–2)
BUN SERPL-MCNC: 14 MG/DL (ref 8–23)
CALCIUM SERPL-MCNC: 8.7 MG/DL (ref 8.3–10.4)
CHLORIDE SERPL-SCNC: 110 MMOL/L (ref 101–110)
CO2 SERPL-SCNC: 26 MMOL/L (ref 21–32)
CREAT SERPL-MCNC: 0.9 MG/DL (ref 0.8–1.5)
DIFFERENTIAL METHOD BLD: ABNORMAL
EOSINOPHIL # BLD: 0.3 K/UL (ref 0–0.8)
EOSINOPHIL NFR BLD: 3 % (ref 0.5–7.8)
ERYTHROCYTE [DISTWIDTH] IN BLOOD BY AUTOMATED COUNT: 13.9 % (ref 11.9–14.6)
GLUCOSE SERPL-MCNC: 95 MG/DL (ref 65–100)
HCT VFR BLD AUTO: 41.8 % (ref 41.1–50.3)
HGB BLD-MCNC: 13.5 G/DL (ref 13.6–17.2)
IMM GRANULOCYTES # BLD AUTO: 0.2 K/UL (ref 0–0.5)
IMM GRANULOCYTES NFR BLD AUTO: 1 % (ref 0–5)
INR PPP: 1.1
LYMPHOCYTES # BLD: 1.4 K/UL (ref 0.5–4.6)
LYMPHOCYTES NFR BLD: 12 % (ref 13–44)
MCH RBC QN AUTO: 31.7 PG (ref 26.1–32.9)
MCHC RBC AUTO-ENTMCNC: 32.3 G/DL (ref 31.4–35)
MCV RBC AUTO: 98.1 FL (ref 82–102)
MONOCYTES # BLD: 0.8 K/UL (ref 0.1–1.3)
MONOCYTES NFR BLD: 7 % (ref 4–12)
NEUTS SEG # BLD: 8.5 K/UL (ref 1.7–8.2)
NEUTS SEG NFR BLD: 76 % (ref 43–78)
NRBC # BLD: 0 K/UL (ref 0–0.2)
PLATELET # BLD AUTO: 95 K/UL (ref 150–450)
PMV BLD AUTO: 10.6 FL (ref 9.4–12.3)
POTASSIUM SERPL-SCNC: 4.2 MMOL/L (ref 3.5–5.1)
PROTHROMBIN TIME: 14.2 SEC (ref 12.6–14.3)
RBC # BLD AUTO: 4.26 M/UL (ref 4.23–5.6)
SODIUM SERPL-SCNC: 139 MMOL/L (ref 133–143)
UFH PPP CHRO-ACNC: >1.1 IU/ML (ref 0.3–0.7)
WBC # BLD AUTO: 11.2 K/UL (ref 4.3–11.1)

## 2023-03-10 PROCEDURE — 36415 COLL VENOUS BLD VENIPUNCTURE: CPT

## 2023-03-10 PROCEDURE — 6360000002 HC RX W HCPCS: Performed by: INTERNAL MEDICINE

## 2023-03-10 PROCEDURE — 85610 PROTHROMBIN TIME: CPT

## 2023-03-10 PROCEDURE — 2580000003 HC RX 258: Performed by: INTERNAL MEDICINE

## 2023-03-10 PROCEDURE — 06CY3ZZ EXTIRPATION OF MATTER FROM LOWER VEIN, PERCUTANEOUS APPROACH: ICD-10-PCS | Performed by: RADIOLOGY

## 2023-03-10 PROCEDURE — 1100000000 HC RM PRIVATE

## 2023-03-10 PROCEDURE — 94760 N-INVAS EAR/PLS OXIMETRY 1: CPT

## 2023-03-10 PROCEDURE — 94640 AIRWAY INHALATION TREATMENT: CPT

## 2023-03-10 PROCEDURE — 85025 COMPLETE CBC W/AUTO DIFF WBC: CPT

## 2023-03-10 PROCEDURE — 85730 THROMBOPLASTIN TIME PARTIAL: CPT

## 2023-03-10 PROCEDURE — 6360000004 HC RX CONTRAST MEDICATION: Performed by: RADIOLOGY

## 2023-03-10 PROCEDURE — 99232 SBSQ HOSP IP/OBS MODERATE 35: CPT | Performed by: INTERNAL MEDICINE

## 2023-03-10 PROCEDURE — 80048 BASIC METABOLIC PNL TOTAL CA: CPT

## 2023-03-10 PROCEDURE — 2580000003 HC RX 258: Performed by: NURSE ANESTHETIST, CERTIFIED REGISTERED

## 2023-03-10 PROCEDURE — 3700000001 HC ADD 15 MINUTES (ANESTHESIA)

## 2023-03-10 PROCEDURE — 2500000003 HC RX 250 WO HCPCS: Performed by: NURSE ANESTHETIST, CERTIFIED REGISTERED

## 2023-03-10 PROCEDURE — 3700000000 HC ANESTHESIA ATTENDED CARE

## 2023-03-10 PROCEDURE — 37187 VENOUS MECH THROMBECTOMY: CPT

## 2023-03-10 PROCEDURE — 6370000000 HC RX 637 (ALT 250 FOR IP): Performed by: INTERNAL MEDICINE

## 2023-03-10 PROCEDURE — B51B1ZZ FLUOROSCOPY OF RIGHT LOWER EXTREMITY VEINS USING LOW OSMOLAR CONTRAST: ICD-10-PCS | Performed by: RADIOLOGY

## 2023-03-10 PROCEDURE — 85520 HEPARIN ASSAY: CPT

## 2023-03-10 PROCEDURE — 2700000000 HC OXYGEN THERAPY PER DAY

## 2023-03-10 RX ORDER — HEPARIN SODIUM 1000 [USP'U]/ML
40 INJECTION, SOLUTION INTRAVENOUS; SUBCUTANEOUS PRN
Status: ACTIVE | OUTPATIENT
Start: 2023-03-10 | End: 2023-03-11

## 2023-03-10 RX ORDER — HEPARIN SODIUM 10000 [USP'U]/100ML
5-30 INJECTION, SOLUTION INTRAVENOUS CONTINUOUS
Status: DISCONTINUED | OUTPATIENT
Start: 2023-03-10 | End: 2023-03-10 | Stop reason: SDUPTHER

## 2023-03-10 RX ORDER — HEPARIN SODIUM 1000 [USP'U]/ML
80 INJECTION, SOLUTION INTRAVENOUS; SUBCUTANEOUS PRN
Status: ACTIVE | OUTPATIENT
Start: 2023-03-10 | End: 2023-03-11

## 2023-03-10 RX ORDER — DEXMEDETOMIDINE HYDROCHLORIDE 100 UG/ML
INJECTION, SOLUTION INTRAVENOUS PRN
Status: DISCONTINUED | OUTPATIENT
Start: 2023-03-10 | End: 2023-03-10 | Stop reason: SDUPTHER

## 2023-03-10 RX ORDER — HEPARIN SODIUM 10000 [USP'U]/100ML
5-30 INJECTION, SOLUTION INTRAVENOUS CONTINUOUS
Status: DISCONTINUED | OUTPATIENT
Start: 2023-03-10 | End: 2023-03-11

## 2023-03-10 RX ORDER — HEPARIN SODIUM 1000 [USP'U]/ML
80 INJECTION, SOLUTION INTRAVENOUS; SUBCUTANEOUS ONCE
Status: COMPLETED | OUTPATIENT
Start: 2023-03-10 | End: 2023-03-10

## 2023-03-10 RX ORDER — SODIUM CHLORIDE, SODIUM LACTATE, POTASSIUM CHLORIDE, CALCIUM CHLORIDE 600; 310; 30; 20 MG/100ML; MG/100ML; MG/100ML; MG/100ML
INJECTION, SOLUTION INTRAVENOUS CONTINUOUS PRN
Status: DISCONTINUED | OUTPATIENT
Start: 2023-03-10 | End: 2023-03-10 | Stop reason: SDUPTHER

## 2023-03-10 RX ORDER — HEPARIN SODIUM 10000 [USP'U]/100ML
5-30 INJECTION, SOLUTION INTRAVENOUS CONTINUOUS
Status: DISCONTINUED | OUTPATIENT
Start: 2023-03-10 | End: 2023-03-10

## 2023-03-10 RX ORDER — KETAMINE HYDROCHLORIDE 50 MG/ML
INJECTION, SOLUTION, CONCENTRATE INTRAMUSCULAR; INTRAVENOUS PRN
Status: DISCONTINUED | OUTPATIENT
Start: 2023-03-10 | End: 2023-03-10 | Stop reason: SDUPTHER

## 2023-03-10 RX ORDER — DEXMEDETOMIDINE HYDROCHLORIDE 4 UG/ML
INJECTION, SOLUTION INTRAVENOUS CONTINUOUS PRN
Status: DISCONTINUED | OUTPATIENT
Start: 2023-03-10 | End: 2023-03-10 | Stop reason: SDUPTHER

## 2023-03-10 RX ADMIN — IPRATROPIUM BROMIDE 0.5 MG: 0.5 SOLUTION RESPIRATORY (INHALATION) at 07:30

## 2023-03-10 RX ADMIN — IPRATROPIUM BROMIDE 0.5 MG: 0.5 SOLUTION RESPIRATORY (INHALATION) at 19:27

## 2023-03-10 RX ADMIN — BUDESONIDE 500 MCG: 0.5 INHALANT RESPIRATORY (INHALATION) at 19:28

## 2023-03-10 RX ADMIN — DEXMEDETOMIDINE HYDROCHLORIDE 0.5 MCG/KG/HR: 4 INJECTION, SOLUTION INTRAVENOUS at 12:16

## 2023-03-10 RX ADMIN — HEPARIN SODIUM 8340 UNITS: 1000 INJECTION INTRAVENOUS; SUBCUTANEOUS at 09:10

## 2023-03-10 RX ADMIN — ATORVASTATIN CALCIUM 80 MG: 80 TABLET, FILM COATED ORAL at 20:26

## 2023-03-10 RX ADMIN — IOPAMIDOL 40 ML: 612 INJECTION, SOLUTION INTRAVENOUS at 12:54

## 2023-03-10 RX ADMIN — CARVEDILOL 12.5 MG: 12.5 TABLET, FILM COATED ORAL at 14:28

## 2023-03-10 RX ADMIN — ALBUTEROL SULFATE 2.5 MG: 2.5 SOLUTION RESPIRATORY (INHALATION) at 14:04

## 2023-03-10 RX ADMIN — ALBUTEROL SULFATE 2.5 MG: 2.5 SOLUTION RESPIRATORY (INHALATION) at 07:30

## 2023-03-10 RX ADMIN — CARVEDILOL 12.5 MG: 12.5 TABLET, FILM COATED ORAL at 07:35

## 2023-03-10 RX ADMIN — ALBUTEROL SULFATE 2.5 MG: 2.5 SOLUTION RESPIRATORY (INHALATION) at 19:28

## 2023-03-10 RX ADMIN — KETAMINE HYDROCHLORIDE 20 MG: 50 INJECTION, SOLUTION INTRAMUSCULAR; INTRAVENOUS at 12:28

## 2023-03-10 RX ADMIN — IPRATROPIUM BROMIDE 0.5 MG: 0.5 SOLUTION RESPIRATORY (INHALATION) at 14:04

## 2023-03-10 RX ADMIN — SODIUM CHLORIDE, PRESERVATIVE FREE 10 ML: 5 INJECTION INTRAVENOUS at 14:24

## 2023-03-10 RX ADMIN — DEXMEDETOMIDINE 8 MCG: 100 INJECTION, SOLUTION, CONCENTRATE INTRAVENOUS at 12:20

## 2023-03-10 RX ADMIN — HEPARIN SODIUM 18 UNITS/KG/HR: 10000 INJECTION, SOLUTION INTRAVENOUS at 09:12

## 2023-03-10 RX ADMIN — SODIUM CHLORIDE, SODIUM LACTATE, POTASSIUM CHLORIDE, AND CALCIUM CHLORIDE: 600; 310; 30; 20 INJECTION, SOLUTION INTRAVENOUS at 12:06

## 2023-03-10 RX ADMIN — KETAMINE HYDROCHLORIDE 20 MG: 50 INJECTION, SOLUTION INTRAMUSCULAR; INTRAVENOUS at 12:15

## 2023-03-10 RX ADMIN — SODIUM CHLORIDE, PRESERVATIVE FREE 10 ML: 5 INJECTION INTRAVENOUS at 20:26

## 2023-03-10 RX ADMIN — BUDESONIDE 500 MCG: 0.5 INHALANT RESPIRATORY (INHALATION) at 07:30

## 2023-03-10 RX ADMIN — DEXMEDETOMIDINE 12 MCG: 100 INJECTION, SOLUTION, CONCENTRATE INTRAVENOUS at 12:15

## 2023-03-10 ASSESSMENT — PAIN SCALES - GENERAL
PAINLEVEL_OUTOF10: 0

## 2023-03-10 ASSESSMENT — PAIN - FUNCTIONAL ASSESSMENT: PAIN_FUNCTIONAL_ASSESSMENT: NONE - DENIES PAIN

## 2023-03-10 NOTE — ANESTHESIA POSTPROCEDURE EVALUATION
Department of Anesthesiology  Postprocedure Note    Patient: Mirta Means  MRN: [id number]  [place]: 1955  Date of evaluation: 3/10/2023      Procedure Summary     Date: 03/10/23 Room / Location: [facility]    Anesthesia Start: 1201 Anesthesia Stop: 6153    Procedure: IR THROMB MECH VEIN Diagnosis: (DVT)    Scheduled Providers: Eugenia Whittington MD; ARTURO Aguilar - CRNA Responsible Provider: Swati Johnson MD    Anesthesia Type: TIVA ASA Status: 4          Anesthesia Type: TIVA    Tania Phase I: Tania Score: 9    Tania Phase II:        Anesthesia Post Evaluation    Patient location during evaluation: PACU  Patient participation: complete - patient participated  Level of consciousness: awake and alert  Airway patency: patent  Nausea & Vomiting: no nausea and no vomiting  Complications: no  Cardiovascular status: hemodynamically stable  Respiratory status: acceptable, nonlabored ventilation and spontaneous ventilation  Hydration status: euvolemic  Comments: /68   Pulse 80   Temp 98.3 °F (36.8 °C) (Temporal)   Resp 20   Ht 5' 7\" (1.702 m)   Wt 230 lb (104.3 kg)   SpO2 91%   BMI 36.02 kg/m²     Multimodal analgesia pain management approach

## 2023-03-10 NOTE — PROGRESS NOTES
Bedside and verbal shift change report received from  OTILIA WARD [facility] (offgoing nurse). Report included the following information SBAR, Kardex, Intake/Output, MAR, Recent Results, Med Rec Status, Cardiac Rhythm ST, and Alarm Parameters .      Dual skin assessment completed at bedside: skin intact (list pertinent skin assessment findings)    Dual verification of gtts completed (name of gtts verified): n/a

## 2023-03-10 NOTE — PROGRESS NOTES
Clarified Heparin gtt with IR. Per IR staff okay to start Heparin prior to going to IR at 1030 per Dr. Boni Cheung. [facility] in pharmacy notified and has verified Heparin bolus and initial rate; see MAR.

## 2023-03-10 NOTE — PROGRESS NOTES
TRANSFER - OUT REPORT:    Verbal report given to 8th floor RN on Alanna Mendenhall  being transferred to 8th floor for routine progression of patient care       Report consisted of patient's Situation, Background, Assessment and   Recommendations(SBAR). Information from the following report(s) Nurse Handoff Report, Adult Overview, Intake/Output, MAR, Recent Results, Med Rec Status, Cardiac Rhythm NSR, and Quality Measures was reviewed with the receiving nurse. [place] Assessment: No data recorded  Lines:   Peripheral IV 03/08/23 Right Forearm (Active)   Site Assessment Clean, dry & intact 03/10/23 1045   Line Status Infusing 03/10/23 1045   Line Care Connections checked and tightened;Cap changed 03/10/23 1045   Phlebitis Assessment No symptoms 03/10/23 1045   Infiltration Assessment 0 03/10/23 1045   Alcohol Cap Used Yes 03/10/23 1045   Dressing Status Clean, dry & intact 03/10/23 1045   Dressing Type Transparent 03/10/23 1045       Peripheral IV 03/08/23 Left Hand (Active)   Site Assessment Clean, dry & intact 03/10/23 1045   Line Status Flushed;Capped 03/10/23 1045   Line Care Connections checked and tightened;Cap changed 03/10/23 1045   Phlebitis Assessment No symptoms 03/10/23 1045   Infiltration Assessment 0 03/10/23 1045   Alcohol Cap Used Yes 03/10/23 1045   Dressing Status Clean, dry & intact 03/10/23 1045   Dressing Type Transparent 03/10/23 1045   Dressing Intervention New 03/08/23 1800        Opportunity for questions and clarification was provided. Patient transported with:  O2 @ 3lpm and Registered Nurse  Transported with belongings including clothing, x2 cell phones, and one . Denies other valuables.

## 2023-03-10 NOTE — INTERDISCIPLINARY ROUNDS
Multi-D Rounds/Checklist (leapfrog):  Lines: can any be removed?: None       DVT Prophylaxis: Ordered treatment- IR  Vent: N/A  Nutrition Ordered/appropriate: Per Primary Team  Can antibiotics or other drugs be stopped? Is Nozin performed: N/A  Inpat Anti-Infectives (From admission, onward)      None          Consults needed: None  A: Is pain control adequate? (has PRNs? Stop drip?) Yes  B: Sedation break and SBT? N/A  C: Is sedation choice appropriate? N/A  D: Delirium/CAM-ICU? No  E: Mobility goals/appropriateness? N/A  F: Family update and plan? wife is primary contact and is being updated daily by primary attending and nursing staff.     Moshe Tsang, APRN - CNP

## 2023-03-10 NOTE — OP NOTE
Department of Interventional Radiology  [phone]        Interventional Radiology Brief Procedure Note    Patient: Ace Castillo MRN: [id number]  SSN: [id number]    YOB: 1955  Age: 67 y.o.  Sex: male      Date of Procedure: 3/10/2023    Pre-Procedure Diagnosis: DVT    Post-Procedure Diagnosis: SAME    Procedure(s): Thrombectomy    Brief Description of Procedure: as above    Performed By: MANOJ STRICKLAND MD     Assistants: None    Anesthesia:TIVS/MAC    Estimated Blood Loss: Less than 10ml    Specimens:  None    Implants:  None    Findings: exctensive RLE dvt, successful thrombectomy    Complications: None    Recommendations: routine post care.  OK by me to transition to oral anticoagulation tonight or tomorrow     Follow Up: my clinic 1 month    Signed By: MANOJ STRICKLAND MD     March 10, 2023

## 2023-03-10 NOTE — PROGRESS NOTES
RN spoke with pharmacy at this time. Pharmacy informed RN that patient's heparin protocol should be with the aPTT lab draws and not anti-xa draws. Pharmacist changed lab orders to PTT every 6 hours. Pharmacist also recommended to resume drip at previous rate and to draw a STAT aPTT. Drip resumed and order placed.

## 2023-03-10 NOTE — PROGRESS NOTES
Hospitalist Progress Note   Admit Date:  3/8/2023  1:04 PM   Name:  Chayito Lozano   Age:  75 y.o. Sex:  male  :  1955   MRN:  [id number]   Room:  [facility]/    Presenting Complaint: Dizziness and Extremity Weakness     Reason(s) for Admission: Bilateral pulmonary embolism ([address] [I26.99]  Acute respiratory failure with hypoxia ([address] [J96.01]  Other acute pulmonary embolism with acute cor pulmonale ([address] [I26.09]     Hospital Course:   Chayito oLzano is a 75 y.o. male with medical history of PAD, hypertension, CAD, hyperlipidemia, bilateral PE status post thrombectomy and RLE DVT on eliquis who presented with acute onset of dyspnea on exertion that started this AM. Also reporting B/l LE weakness with muscle cramps around his calves as well as LUE weakness w/o numbness or tingling. Reports missing a few doses of liquids as well as being on testosterone supplements. In the Emergency room, patient was tachycardic, tachypneic and saturating 87% on room air requiring 15 L O2 nasal cannula to maintain his saturation. CT chest shows acute appearing occlusive bilateral pulmonary emboli with large clot burden and findings suggestive of right heart strain. IR consulted and underwent lysis of PE on 3/9. Ultrasound lower extremity with extensive right lower extremity DVT. Patient with successful thrombectomy on 3/10 with IR. Subjective & 24hr Events (03/10/23): Patient is seen and examined at bedside. No acute events noted overnight. Patient is laying in bed without any acute distress. Underwent successful thrombectomy for RLE today. Weaned to 3L O2NC currently without respiratory distress. Denies any fever, chills, chest pain, nausea, vomiting. Assessment & Plan:   Acute respiratory failure with hypoxia due to bilateral pulmonary embolism, acute  Hx B/l Pulm Embolism s/p thrombectomy and  RLE DVT in 2022  CT imagine showing extensive bilateral PE with clot burden.  Hypoxic to 87% on RA. US with extensive RLE DVT. S/p lysis of submassive PE as well as thrombectomy of RLE. - continue with heparin gtt  - Appreciate IR's assistance with lysis of PE  - hematology recs appreciated. Planned eliquis as PE/DVT due to non compliance issue and concurrent testosterone injections. Pt agreed to stopped testosterone  - O2 supplement and wean as tolerated. Weaned to 3L O2  - will start back on eliquis likely tomorrow AM     Sinus tachycardia due to hypoxia from PE - resolved     Chronic combined systolic and diastolic CHF  Echo 33/53 with EF 45-50% and abnormal diastolic function  BNP of 293 this admission but 4k in 11/22  - Echo today with EF 45-50%, abnormal diastolic function and global hypokinesis  - change metoprolol to coreg. Ginny [facility] LUE weakness  With PE while on Eliquis, patient is at risk for CVA. LUE is slightly weak on exam  - resolved after tPA    CAD // HTN // HLD  - Asa, lipitor , now on coreg    Hx of smoking  50 pack yr  - PFT as outpatietn  - nebs  - pulmonary recs appreciated      Diet:  ADULT DIET; Regular  VTE prophylaxis:  heparin gtt  Code status: Full Code    Additional Medical Decision Making factors:  Complexity: 1 or more acute or chronic illness with exacerbation that poses a threat to life, organ, or function. (High)  Complexity: 2 or more stable chronic illnesses. (Moderate)      I conducted an independent review of: Labs   I conducted an independent review of: Imaging and/or other diagnostic studies     Treatment Risks: Drug requiring intensive monitoring for toxicity. (High)  Treatment Risks: Prescription drug management. (Moderate)    Shared decision making was utilized in the care of this patient. I discussed patient's case with an interdisciplinary team.  I discussed patient's case with ICU attending regarding weaning off Airvo  I discussed patient's case with a . I discussed patient's case with a nurse.     Critical care time: 33 minutes    Patient is critically ill. Without intervention, there is a high probability of acute organ impairment or life-threatening deterioration in the patient's condition from: submassive PE, RLE DVT, respiratory failure    Critical care interventions: Heparin gtt, Airvo    More than 50% of the time documented was spent in face-to-face contact with the patient and in the care of the patient on the floor/unit where the patient is located. Due to a high probability of clinically significant, life-threatening deterioration that could result in multiorgan failure,  this critically ill or injured patient required my highest level preparedness to intervene emergent. This critical care time included time spent at bedside performing patient interview and physical exam, time spent researching patient prior to interaction with patient, time spent discussing findings and treatment plan with patient and/or family, time spent discussing patient with consultants and colleagues, time spent reviewing pertinent laboratory and radiographic evaluations, time spent re-evaluating patient, or time spent discussing patient with nursing staff. The time listed is exclusive of any procedures which may have been performed. Hospital Problems:  Principal Problem:    Bilateral pulmonary embolism (HCC)  Active Problems:    Sinus tachycardia    Acute pulmonary embolism, unspecified pulmonary embolism type, unspecified whether acute cor pulmonale present (HCC)    Chronic combined systolic and diastolic congestive heart failure (HCC)    LUE weakness    Acute respiratory failure with hypoxia (HCC)    Pain in both lower extremities    Pulmonary embolus (HCC)    Leg swelling    Testosterone adverse reaction    Non-compliance    PAD (peripheral artery disease) ([address]    Essential hypertension    Coronary artery disease involving native coronary artery of native heart without angina pectoris  Resolved Problems:    * No resolved hospital problems. *      Objective:   Patient Vitals for the past 24 hrs:   Temp Pulse Resp BP SpO2   03/10/23 1208 -- -- -- -- 93 %   03/10/23 1030 -- 79 19 125/74 91 %   03/10/23 1015 -- 81 -- -- 94 %   03/10/23 1000 -- 79 23 120/75 92 %   03/10/23 0945 -- 80 24 -- 92 %   03/10/23 0930 -- 81 19 125/79 94 %   03/10/23 0915 -- 79 26 -- 92 %   03/10/23 0900 -- 78 24 117/74 91 %   03/10/23 0845 -- 80 25 -- 90 %   03/10/23 0830 -- 83 -- 114/76 (!) 89 %   03/10/23 0800 -- 93 -- (!) 169/79 --   03/10/23 0745 -- 81 22 -- --   03/10/23 0734 -- 86 24 -- 96 %   03/10/23 0733 -- 84 14 -- 96 %   03/10/23 0732 -- 86 26 -- 94 %   03/10/23 0730 98.4 °F (36.9 °C) 80 16 127/76 93 %   03/10/23 0715 -- 79 15 -- 93 %   03/10/23 0700 -- 79 15 133/70 93 %   03/10/23 0645 -- 77 17 -- 93 %   03/10/23 0615 -- 79 16 -- 93 %   03/10/23 0600 -- 79 14 122/67 92 %   03/10/23 0545 -- 81 -- -- 92 %   03/10/23 0530 -- 76 (!) 8 123/65 90 %   03/10/23 0515 -- 78 17 -- 92 %   03/10/23 0500 -- 83 (!) 35 127/82 91 %   03/10/23 0445 -- 79 18 -- 92 %   03/10/23 0430 -- 85 24 123/73 92 %   03/10/23 0415 -- 80 17 -- 94 %   03/10/23 0400 -- 81 17 116/66 93 %   03/10/23 0345 -- 80 16 -- 92 %   03/10/23 0330 -- 80 19 116/65 92 %   03/10/23 0315 -- 80 18 -- 91 %   03/10/23 0300 98.4 °F (36.9 °C) 81 17 106/64 91 %   03/10/23 0245 -- 82 16 -- 91 %   03/10/23 0238 -- -- -- (!) 146/80 --   03/10/23 0230 -- 96 -- -- 92 %   03/10/23 0215 -- 85 18 -- 91 %   03/10/23 0200 -- 87 15 (!) 109/58 93 %   03/10/23 0145 -- 85 18 -- 91 %   03/10/23 0130 -- 86 16 102/62 91 %   03/10/23 0115 -- 86 18 -- 92 %   03/10/23 0100 -- 90 17 (!) 106/58 91 %   03/10/23 0045 -- 91 18 -- 91 %   03/10/23 0030 -- 92 18 (!) 98/54 91 %   03/10/23 0015 -- 90 24 -- 91 %   03/10/23 0000 -- 92 22 (!) 110/56 90 %   03/09/23 2345 -- 92 19 -- 91 %   03/09/23 2330 -- 96 23 119/60 92 %   03/09/23 2315 -- 96 -- -- 91 %   03/09/23 2300 98.3 °F (36.8 °C) 94 21 (!) 104/57 91 %   03/09/23 2245 -- 97 22 -- (!) 88 %   03/09/23 2230 -- (!) 107 -- (!) 102/56 93 %   03/09/23 2225 -- -- -- 134/73 --   03/09/23 2215 -- 97 18 (!) 152/75 92 %   03/09/23 2200 -- 96 18 108/64 92 %   03/09/23 2145 -- 97 18 117/67 92 %   03/09/23 2130 -- (!) 103 24 120/70 95 %   03/09/23 2115 -- 100 22 118/69 92 %   03/09/23 2100 -- 99 -- 118/70 93 %   03/09/23 2045 -- (!) 105 -- -- 93 %   03/09/23 2044 -- -- -- 126/72 --   03/09/23 2030 -- (!) 103 25 128/74 93 %   03/09/23 2015 -- (!) 106 25 116/66 94 %   03/09/23 2000 -- 100 25 119/71 92 %   03/09/23 1945 -- 100 28 113/71 91 %   03/09/23 1930 -- (!) 102 27 -- 92 %   03/09/23 1915 98.9 °F (37.2 °C) 100 15 109/63 95 %   03/09/23 1910 -- (!) 103 18 -- 93 %   03/09/23 1900 -- 98 21 112/69 91 %   03/09/23 1845 -- (!) 101 19 100/61 (!) 89 %   03/09/23 1815 -- 98 (!) 33 (!) 94/52 92 %   03/09/23 1745 -- (!) 104 18 112/71 92 %   03/09/23 1715 -- (!) 112 21 127/76 92 %   03/09/23 1645 -- (!) 121 (!) 35 112/75 96 %   03/09/23 1615 -- (!) 112 23 113/70 92 %   03/09/23 1545 98.1 °F (36.7 °C) (!) 112 29 118/75 (!) 80 %   03/09/23 1530 -- (!) 122 30 117/71 92 %   03/09/23 1515 -- 99 22 133/76 99 %   03/09/23 1512 -- 100 16 -- 97 %   03/09/23 1500 -- (!) 104 -- 134/79 96 %   03/09/23 1445 -- (!) 101 -- 134/86 95 %   03/09/23 1430 -- 95 20 139/80 93 %   03/09/23 1415 -- (!) 103 23 (!) 142/80 95 %       Oxygen Therapy  SpO2: 93 %  Pulse Oximetry Type: Continuous  Pulse via Oximetry: 80 beats per minute  Pulse Oximeter Device Mode: Continuous  Pulse Oximeter Device Location: Finger  O2 Device: Nasal cannula  Oximetry Probe Site Changed: Yes  Skin Assessment: Clean, dry, & intact  Skin Protection for O2 Device: N/A  FiO2 : 35 %  O2 Flow Rate (L/min): 3 L/min    Estimated body mass index is 36.02 kg/m² as calculated from the following:    Height as of this encounter: 5' 7\" (1.702 m). Weight as of this encounter: 230 lb (104.3 kg).     Intake/Output Summary (Last 24 hours) at 3/10/2023 1400  Last data filed at 3/10/2023 1250  Gross per 24 hour   Intake 644.07 ml   Output 1630 ml   Net -985.93 ml         Physical Exam:     Blood pressure 125/74, pulse 79, temperature 98.4 °F (36.9 °C), temperature source Axillary, resp. rate 19, height 5' 7\" (1.702 m), weight 230 lb (104.3 kg), SpO2 93 %. General:    Well nourished. Head:  Normocephalic, atraumatic  Eyes:  Sclerae appear normal.  Pupils equally round. ENT:  Nares appear normal.  Moist oral mucosa  Neck:  No restricted ROM. Trachea midline   CV:   RRR. No m/r/g. No jugular venous distension. Lungs:   CTAB. No wheezing  Symmetric expansion. Abdomen:   Soft, nontender, nondistended. Extremities: No cyanosis or clubbing.  + edema (R>L)  Skin:     No rashes and normal coloration. Warm and dry. Neuro:  CN II-XII grossly intact. Psych:  Normal mood and affect.       I have personally reviewed labs and tests:  Recent Labs:  Recent Results (from the past 48 hour(s))   APTT    Collection Time: 03/08/23  6:16 PM   Result Value Ref Range    PTT 99.5 (H) 24.5 - 34.2 SEC   CBC    Collection Time: 03/08/23  6:16 PM   Result Value Ref Range    WBC 14.4 (H) 4.3 - 11.1 K/uL    RBC 5.05 4.23 - 5.6 M/uL    Hemoglobin 15.9 13.6 - 17.2 g/dL    Hematocrit 50.1 41.1 - 50.3 %    MCV 99.2 82 - 102 FL    MCH 31.5 26.1 - 32.9 PG    MCHC 31.7 31.4 - 35.0 g/dL    RDW 14.0 11.9 - 14.6 %    Platelets 268 (L) 697 - 450 K/uL    MPV 10.7 9.4 - 12.3 FL    nRBC 0.00 0.0 - 0.2 K/uL   Protime-INR    Collection Time: 03/08/23  6:16 PM   Result Value Ref Range    Protime 14.7 (H) 12.6 - 14.3 sec    INR 1.1     Anti-Xa, Unfractionated Heparin    Collection Time: 03/08/23  6:16 PM   Result Value Ref Range    Anti-XA Unfrac Heparin 0.68 0.3 - 0.7 IU/mL   Culture, Blood 1    Collection Time: 03/08/23  6:19 PM    Specimen: Blood   Result Value Ref Range    Special Requests LEFT  HAND        Culture NO GROWTH 2 DAYS     Anti-Xa, Unfractionated Heparin    Collection Time: 03/08/23  7:59 PM   Result Value Ref Range Anti-XA Unfrac Heparin 0.34 0.3 - 0.7 IU/mL   Anti-Xa, Unfractionated Heparin    Collection Time: 03/08/23 11:36 PM   Result Value Ref Range    Anti-XA Unfrac Heparin <0.10 (L) 0.3 - 0.7 IU/mL   CBC    Collection Time: 03/08/23 11:36 PM   Result Value Ref Range    WBC 12.2 (H) 4.3 - 11.1 K/uL    RBC 4.60 4.23 - 5.6 M/uL    Hemoglobin 14.5 13.6 - 17.2 g/dL    Hematocrit 45.0 41.1 - 50.3 %    MCV 97.8 82 - 102 FL    MCH 31.5 26.1 - 32.9 PG    MCHC 32.2 31.4 - 35.0 g/dL    RDW 14.0 11.9 - 14.6 %    Platelets 115 (L) 354 - 450 K/uL    MPV 10.9 9.4 - 12.3 FL    nRBC 0.00 0.0 - 0.2 K/uL   APTT    Collection Time: 03/08/23 11:36 PM   Result Value Ref Range    PTT 37.1 (H) 24.5 - 34.2 SEC   Fibrinogen    Collection Time: 03/08/23 11:36 PM   Result Value Ref Range    Fibrinogen 409 190 - 501 mg/dL   CBC    Collection Time: 03/09/23  5:54 AM   Result Value Ref Range    WBC 11.6 (H) 4.3 - 11.1 K/uL    RBC 4.45 4.23 - 5.6 M/uL    Hemoglobin 13.8 13.6 - 17.2 g/dL    Hematocrit 44.2 41.1 - 50.3 %    MCV 99.3 82 - 102 FL    MCH 31.0 26.1 - 32.9 PG    MCHC 31.2 (L) 31.4 - 35.0 g/dL    RDW 14.0 11.9 - 14.6 %    Platelets 725 (L) 128 - 450 K/uL    MPV 10.8 9.4 - 12.3 FL    nRBC 0.00 0.0 - 0.2 K/uL   APTT    Collection Time: 03/09/23  5:54 AM   Result Value Ref Range    PTT 35.7 (H) 24.5 - 34.2 SEC   Fibrinogen    Collection Time: 03/09/23  5:54 AM   Result Value Ref Range    Fibrinogen 357 190 - 501 mg/dL   Protime-INR    Collection Time: 03/09/23  5:54 AM   Result Value Ref Range    Protime 14.7 (H) 12.6 - 14.3 sec    INR 1.1     Anti-Xa, Unfractionated Heparin    Collection Time: 03/09/23  5:54 AM   Result Value Ref Range    Anti-XA Unfrac Heparin <0.1 (L) 0.3 - 0.7 IU/mL   Transthoracic echocardiogram (TTE) complete with contrast, bubble, strain, and 3D PRN    Collection Time: 03/09/23  8:30 AM   Result Value Ref Range    Body Surface Area 2.22 m2    LV EDV A2C 77 mL    LV EDV A4C 73 mL    LV ESV A2C 41 mL    LV ESV A4C 39 mL IVSd 0.9 0.6 - 1.0 cm    LVIDd 4.6 4.2 - 5.9 cm    LVIDs 3.7 cm    LVOT Diameter 2.1 cm    LVOT Mean Gradient 1 mmHg    LVOT VTI 13.0 cm    LVOT Peak Velocity 0.9 m/s    LVOT Peak Gradient 3 mmHg    LVPWd 0.9 0.6 - 1.0 cm    LV E' Lateral Velocity 7 cm/s    LV E' Septal Velocity 8 cm/s    LV Ejection Fraction A2C 46 %    LV Ejection Fraction A4C 47 %    EF BP 45 (A) 55 - 100 %    LVOT Area 3.5 cm2    LVOT SV 45.0 ml    LA Minor Axis 5.3 cm    LA Major [place] 5.4 cm    LA Area 2C 15.7 cm2    LA Area 4C 15.4 cm2    LA Volume 2C 38 18 - 58 mL    LA Volume 4C 34 18 - 58 mL    LA Volume BP 36 18 - 58 mL    AV Mean Velocity 0.8 m/s    AV Mean Gradient 3 mmHg    AV VTI 18.0 cm    AV Peak Velocity 1.2 m/s    AV Peak Gradient 6 mmHg    AV Area by VTI 2.5 cm2    AV Area by Peak Velocity 2.5 cm2    Aortic Root 3.4 cm    Ascending Aorta 3.6 cm    IVC Proxmal 1.9 cm    MV E Wave Deceleration Time 145.0 ms    MV A Velocity 1.27 m/s    MV E Velocity 0.74 m/s    MV Mean Gradient 4 mmHg    MV VTI 21.1 cm    MV Mean Velocity 0.9 m/s    MV Max Velocity 1.3 m/s    MV Peak Gradient 7 mmHg    MV Area by VTI 2.1 cm2    PV .0 ms    PV Max Velocity 0.9 m/s    PV Peak Gradient 3 mmHg    RV Basal Dimension 4.2 cm    RV Free Wall Peak S' 8 cm/s    TAPSE 1.9 1.7 cm    TR Max Velocity 2.85 m/s    TR Peak Gradient 32 mmHg    Fractional Shortening 2D 20 28 - 44 %    LV ESV Index A4C 18 mL/m2    LV EDV Index A4C 34 mL/m2    LV ESV Index A2C 19 mL/m2    LV EDV Index A2C 36 mL/m2    LVIDd Index 2.14 cm/m2    LVIDs Index 1.72 cm/m2    LV RWT Ratio 0.39     LV Mass 2D 137.7 88 - 224 g    LV Mass 2D Index 64.1 49 - 115 g/m2    MV E/A 0.58     E/E' Ratio (Averaged) 9.91     E/E' Lateral 10.57     E/E' Septal 9.25     LA Volume Index BP 17 16 - 34 ml/m2    LVOT Stroke Volume Index 20.9 mL/m2    LA Volume Index 2C 18 16 - 34 mL/m2    LA Volume Index 4C 16 16 - 34 mL/m2    Ao Root Index 1.58 cm/m2    Ascending Aorta Index 1.67 cm/m2    AV Velocity Ratio 0. 75     LVOT:AV VTI Index 0.72     LIZZY/BSA VTI 1.2 cm2/m2    LIZZY/BSA Peak Velocity 1.2 cm2/m2    MV:LVOT VTI Index 1.62     Est. RA Pressure 3 mmHg    RVSP 35 mmHg    Left Ventricular Ejection Fraction 48     LVEF MODALITY ECHO    CBC    Collection Time: 03/09/23  4:23 PM   Result Value Ref Range    WBC 12.1 (H) 4.3 - 11.1 K/uL    RBC 4.39 4.23 - 5.6 M/uL    Hemoglobin 13.9 13.6 - 17.2 g/dL    Hematocrit 43.7 41.1 - 50.3 %    MCV 99.5 82 - 102 FL    MCH 31.7 26.1 - 32.9 PG    MCHC 31.8 31.4 - 35.0 g/dL    RDW 13.8 11.9 - 14.6 %    Platelets 98 (L) 438 - 450 K/uL    MPV 10.7 9.4 - 12.3 FL    nRBC 0.00 0.0 - 0.2 K/uL   CBC with Auto Differential    Collection Time: 03/10/23  4:27 AM   Result Value Ref Range    WBC 11.2 (H) 4.3 - 11.1 K/uL    RBC 4.26 4.23 - 5.6 M/uL    Hemoglobin 13.5 (L) 13.6 - 17.2 g/dL    Hematocrit 41.8 41.1 - 50.3 %    MCV 98.1 82 - 102 FL    MCH 31.7 26.1 - 32.9 PG    MCHC 32.3 31.4 - 35.0 g/dL    RDW 13.9 11.9 - 14.6 %    Platelets 95 (L) 736 - 450 K/uL    MPV 10.6 9.4 - 12.3 FL    nRBC 0.00 0.0 - 0.2 K/uL    Differential Type AUTOMATED      Seg Neutrophils 76 43 - 78 %    Lymphocytes 12 (L) 13 - 44 %    Monocytes 7 4.0 - 12.0 %    Eosinophils % 3 0.5 - 7.8 %    Basophils 0 0.0 - 2.0 %    Immature Granulocytes 1 0.0 - 5.0 %    Segs Absolute 8.5 (H) 1.7 - 8.2 K/UL    Absolute Lymph # 1.4 0.5 - 4.6 K/UL    Absolute Mono # 0.8 0.1 - 1.3 K/UL    Absolute Eos # 0.3 0.0 - 0.8 K/UL    Basophils Absolute 0.0 0.0 - 0.2 K/UL    Absolute Immature Granulocyte 0.2 0.0 - 0.5 K/UL   Basic Metabolic Panel w/ Reflex to MG    Collection Time: 03/10/23  4:27 AM   Result Value Ref Range    Sodium 139 133 - 143 mmol/L    Potassium 4.2 3.5 - 5.1 mmol/L    Chloride 110 101 - 110 mmol/L    CO2 26 21 - 32 mmol/L    Anion Gap 3 2 - 11 mmol/L    Glucose 95 65 - 100 mg/dL    BUN 14 8 - 23 MG/DL    Creatinine 0.90 0.8 - 1.5 MG/DL    Est, Glom Filt Rate >60 >60 ml/min/1.73m2    Calcium 8.7 8.3 - 10.4 MG/DL   APTT Collection Time: 03/10/23  8:34 AM   Result Value Ref Range    PTT 31.7 24.5 - 34.2 SEC   Protime-INR    Collection Time: 03/10/23  8:34 AM   Result Value Ref Range    Protime 14.2 12.6 - 14.3 sec    INR 1.1         I have personally reviewed imaging studies:  Other Studies:  Vascular duplex lower extremity venous bilateral   Final Result      IR TRANSCATH INFUSION THROMB NONCORONARY   Final Result   1. Extensive central pulmonary embolism. Pharmacologic thrombolysis will be   initiated. Plan: ICU observation. CT CHEST PULMONARY EMBOLISM W CONTRAST   Final Result   Acute appearing occlusive bilateral pulmonary emboli with large clot burden and   findings suggestive of right heart strain. [facility]   The critical results contained in this report were communicated to Dr. Isis Joseph by   Dr. Shellie Kirkpatrick over the phone at 3/8/2023 1:41 PM. Critical results were   communicated as outlined in Section II.C.2.a.i of the ACR Practice Guideline for   Communication of Diagnostic Imaging Findings.             IR GUIDED THROMB MECH VEIN    (Results Pending)       Current Meds:  Current Facility-Administered Medications   Medication Dose Route Frequency    heparin (porcine) injection 8,340 Units  80 Units/kg IntraVENous PRN    heparin (porcine) injection 4,170 Units  40 Units/kg IntraVENous PRN    heparin 25,000 units in dextrose 5% 250 mL (premix) infusion  5-30 Units/kg/hr IntraVENous Continuous    sodium chloride flush 0.9 % injection 5-40 mL  5-40 mL IntraVENous 2 times per day    sodium chloride flush 0.9 % injection 5-40 mL  5-40 mL IntraVENous PRN    0.9 % sodium chloride infusion   IntraVENous PRN    potassium chloride (KLOR-CON M) extended release tablet 40 mEq  40 mEq Oral PRN    Or    potassium bicarb-citric acid (EFFER-K) effervescent tablet 40 mEq  40 mEq Oral PRN    Or    potassium chloride 10 mEq/100 mL IVPB (Peripheral Line)  10 mEq IntraVENous PRN    potassium chloride 10 mEq/100 mL IVPB (Peripheral Line)  10 mEq IntraVENous PRN    magnesium sulfate 2000 mg in 50 mL IVPB premix  2,000 mg IntraVENous PRN    ondansetron (ZOFRAN-ODT) disintegrating tablet 4 mg  4 mg Oral Q8H PRN    Or    ondansetron (ZOFRAN) injection 4 mg  4 mg IntraVENous Q6H PRN    polyethylene glycol (GLYCOLAX) packet 17 g  17 g Oral Daily PRN    bisacodyl (DULCOLAX) suppository 10 mg  10 mg Rectal Daily PRN    famotidine (PEPCID) tablet 10 mg  10 mg Oral Daily PRN    aluminum & magnesium hydroxide-simethicone (MAALOX) 200-200-20 MG/5ML suspension 30 mL  30 mL Oral Q6H PRN    acetaminophen (TYLENOL) tablet 650 mg  650 mg Oral Q6H PRN    Or    acetaminophen (TYLENOL) suppository 650 mg  650 mg Rectal Q6H PRN    atorvastatin (LIPITOR) tablet 80 mg  80 mg Oral Nightly    traMADol (ULTRAM) tablet 50 mg  50 mg Oral Q6H PRN    carvedilol (COREG) tablet 12.5 mg  12.5 mg Oral BID WC    ipratropium (ATROVENT) 0.02 % nebulizer solution 0.5 mg  0.5 mg Nebulization TID    budesonide (PULMICORT) nebulizer suspension 500 mcg  0.5 mg Nebulization BID    albuterol (PROVENTIL) nebulizer solution 2.5 mg  2.5 mg Nebulization TID       Signed:  Laurie Mary MD    Part of this note may have been written by using a voice dictation software. The note has been proof read but may still contain some grammatical/other typographical errors.

## 2023-03-10 NOTE — ANESTHESIA PRE PROCEDURE
Department of Anesthesiology  Preprocedure Note       Name:  Wellington Sanchez   Age:  75 y.o.  :  1955                                          MRN:  [id number]         Date:  3/9/2023      Surgeon: * Surgery not found *    Procedure:     Medications prior to admission:   Prior to Admission medications    Medication Sig Start Date End Date Taking? Authorizing Provider   metoprolol succinate (TOPROL XL) 25 MG extended release tablet Take 0.5 tablets by mouth in the morning and at bedtime 1/16/23 7/15/23  Margie Holly MD   apixaban (ELIQUIS) 5 MG TABS tablet Take 10 mg every 12 hours for 7 days (last dose on 11-15-22 at 9 PM), then decrease to 5 mg every 12 hours on 22 at 9 AM 11/15/22   Aristides Gayle MD   nicotine (NICODERM CQ) 7 MG/24HR Place 1 patch onto the skin every 24 hours  Patient not taking: No sig reported 22  Deirdre Hutchins MD   tiotropium (SPIRIVA RESPIMAT) 2.5 MCG/ACT AERS inhaler Inhale 2 puffs into the lungs daily  Patient not taking: No sig reported 22   Deirdre Hutchins MD   aspirin 81 MG EC tablet Take 81 mg by mouth daily    Ar Automatic Reconciliation   atorvastatin (LIPITOR) 80 MG tablet TAKE 1 TABLET EVERY NIGHT 20   Ar Automatic Reconciliation   traMADol (ULTRAM) 50 MG tablet Take 50 mg by mouth every 6 hours as needed. Ar Automatic Reconciliation       Current medications:    No current facility-administered medications for this encounter. No current outpatient medications on file.      Facility-Administered Medications Ordered in Other Encounters   Medication Dose Route Frequency Provider Last Rate Last Admin    sodium chloride flush 0.9 % injection 5-40 mL  5-40 mL IntraVENous 2 times per day Curly Sadler MD        sodium chloride flush 0.9 % injection 5-40 mL  5-40 mL IntraVENous PRN Curly Sadler MD        0.9 % sodium chloride infusion   IntraVENous PRN Curly Sadler MD        potassium chloride (KLOR-CON M) extended release tablet 40 mEq  40 mEq Oral PRN Angélica Baker MD        Or    potassium bicarb-citric acid (EFFER-K) effervescent tablet 40 mEq  40 mEq Oral PRN Angélica Baker MD        Or    potassium chloride 10 mEq/100 mL IVPB (Peripheral Line)  10 mEq IntraVENous PRN Angélica Baker MD        potassium chloride 10 mEq/100 mL IVPB (Peripheral Line)  10 mEq IntraVENous PRN Angélica Baker MD        magnesium sulfate 2000 mg in 50 mL IVPB premix  2,000 mg IntraVENous PRN Angélica Baker MD        ondansetron (ZOFRAN-ODT) disintegrating tablet 4 mg  4 mg Oral Q8H PRN Angélica Baker MD        Or    ondansetron (ZOFRAN) injection 4 mg  4 mg IntraVENous Q6H PRN Angélica Baker MD        polyethylene glycol (GLYCOLAX) packet 17 g  17 g Oral Daily PRN Angélica Baker MD        bisacodyl (DULCOLAX) suppository 10 mg  10 mg Rectal Daily PRN Angélica Baker MD        famotidine (PEPCID) tablet 10 mg  10 mg Oral Daily PRN Angélica Baker MD        aluminum & magnesium hydroxide-simethicone (MAALOX) 200-200-20 MG/5ML suspension 30 mL  30 mL Oral Q6H PRN Angélica Baker MD        acetaminophen (TYLENOL) tablet 650 mg  650 mg Oral Q6H PRN Angélica Baker MD        Or    acetaminophen (TYLENOL) suppository 650 mg  650 mg Rectal Q6H PRN Angélica Baker MD        atorvastatin (LIPITOR) tablet 80 mg  80 mg Oral Nightly Angélica Baker MD        traMADol (ULTRAM) tablet 50 mg  50 mg Oral Q6H PRN Angélica Baker MD        carvedilol (COREG) tablet 12.5 mg  12.5 mg Oral BID  Dk Dumont MD   12.5 mg at 03/09/23 1637    ipratropium (ATROVENT) 0.02 % nebulizer solution 0.5 mg  0.5 mg Nebulization TID Savanna Montez MD   0.5 mg at 03/09/23 1910    budesonide (PULMICORT) nebulizer suspension 500 mcg  0.5 mg Nebulization BID Savanna Montez MD   500 mcg at 03/09/23 1910    albuterol (PROVENTIL) nebulizer solution 2.5 mg  2.5 mg Nebulization TID Savanna Montez MD   2.5 mg at 03/09/23 1910       Allergies:     Allergies   Allergen Reactions    Methocarbamol Hives    Ciprofloxacin Nausea And Vomiting       Problem List:    Patient Active Problem List   Diagnosis Code    PAD (peripheral artery disease) ([facility]) I73.9    Atherosclerosis of both carotid arteries I65.23    Obesity (BMI 35.0-39.9 without comorbidity) E66.9    Noncompliance Z91.199    Essential hypertension I10    Erythrocytosis D75.1    Chronic low back pain with sciatica M54.40, G89.29    Hypogonadism in male E29.1    Coronary artery disease involving native coronary artery of native heart without angina pectoris I25.10    Arthralgia of both knees M25.561, M25.562    Fatigue R53.83    Morbid obesity ([facility] [address] E66.01    Muscle cramps at night R25.2    History of kidney stones Z87.442    Mixed hyperlipidemia E78.2    Acute pulmonary embolism, unspecified pulmonary embolism type, unspecified whether acute cor pulmonale present ([facility]) I26.99    Sinus tachycardia R00.0    Dizziness R42    Bilateral pulmonary embolism ([facility]) I26.99    Chronic combined systolic and diastolic congestive heart failure ([facility]) I50.42    LUE weakness R29.898    Acute respiratory failure with hypoxia ([facility]) J96.01    Pain in both lower extremities M79.604, M79.605    Pulmonary embolus ([facility]) I26.99    Leg swelling M79.89    Testosterone adverse reaction T38.7X5A    Non-compliance Z91.199       Past Medical History:        Diagnosis Date    Arthritis     Asthma     Calculus of kidney     Hypercholesterolemia     Hypertension        Past Surgical History:        Procedure Laterality Date    HEENT Bilateral     cataracts    IR [facility]  2022    [facility] 2022 [facility]    ORTHOPEDIC SURGERY      rotator cuff ten years ago       Social History:    Social History     Tobacco Use    Smoking status: Former     Types: Cigars     Quit date: 2022     Years since quittin.3     Passive exposure: Current    Smokeless tobacco: Never   Substance Use Topics    Alcohol use:  Yes Alcohol/week: 6.0 standard drinks     Types: 6 Cans of beer per week                                Counseling given: Not Answered      Vital Signs (Current): There were no vitals filed for this visit. BP Readings from Last 3 Encounters:   03/09/23 109/63   02/15/23 (!) 152/90   01/23/23 136/88       NPO Status:                                                                                 BMI:   Wt Readings from Last 3 Encounters:   03/08/23 230 lb (104.3 kg)   02/15/23 220 lb (99.8 kg)   01/23/23 219 lb (99.3 kg)     There is no height or weight on file to calculate BMI.    CBC:   Lab Results   Component Value Date/Time    WBC 12.1 03/09/2023 04:23 PM    RBC 4.39 03/09/2023 04:23 PM    HGB 13.9 03/09/2023 04:23 PM    HCT 43.7 03/09/2023 04:23 PM    MCV 99.5 03/09/2023 04:23 PM    RDW 13.8 03/09/2023 04:23 PM    PLT 98 03/09/2023 04:23 PM       CMP:   Lab Results   Component Value Date/Time     03/08/2023 01:06 PM    K 4.3 03/08/2023 01:06 PM     03/08/2023 01:06 PM    CO2 25 03/08/2023 01:06 PM    BUN 24 03/08/2023 01:06 PM    CREATININE 1.40 03/08/2023 01:06 PM    GFRAA 92 06/02/2021 03:15 PM    AGRATIO 1.3 06/02/2021 03:15 PM    LABGLOM 55 03/08/2023 01:06 PM    GLUCOSE 132 03/08/2023 01:06 PM    PROT 8.5 03/08/2023 01:06 PM    CALCIUM 11.1 03/08/2023 01:06 PM    BILITOT 0.5 03/08/2023 01:06 PM    ALKPHOS 76 03/08/2023 01:06 PM    ALKPHOS 75 07/14/2021 01:01 PM    AST 23 03/08/2023 01:06 PM    ALT 28 03/08/2023 01:06 PM       POC Tests: No results for input(s): POCGLU, POCNA, POCK, POCCL, POCBUN, POCHEMO, POCHCT in the last 72 hours.     Coags:   Lab Results   Component Value Date/Time    PROTIME 14.7 03/09/2023 05:54 AM    INR 1.1 03/09/2023 05:54 AM    APTT 35.7 03/09/2023 05:54 AM       HCG (If Applicable): No results found for: PREGTESTUR, PREGSERUM, HCG, HCGQUANT     ABGs: No results found for: PHART, PO2ART, KIZ4JBQ, QPJ8IDD, BEART, A3KXDHLI     Type & Screen (If Applicable):  No results found for: LABABO, [address]    Drug/Infectious Status (If Applicable):  Lab Results   Component Value Date/Time    HEPCAB <0.1 07/11/2017 11:52 AM       COVID-19 Screening (If Applicable):   Lab Results   Component Value Date/Time    COVID19 NOT DETECTED 11/07/2022 02:19 PM           Anesthesia Evaluation  Patient summary reviewed  Airway: Mallampati: II  TM distance: >3 FB   Neck ROM: full  Mouth opening: > = 3 FB   Dental:    (+) edentulous      Pulmonary:   (+) COPD: mild,      Smoker: quit 11/22. ROS comment: Acute respiratory failure with hypoxia due to bilateral PEs   Cardiovascular:  Exercise tolerance: good (>4 METS),   (+) hypertension: mild,             Echocardiogram reviewed               ROS comment: Echo 11/22 -   Left Ventricle: Mildly reduced left ventricular systolic function with a visually estimated EF of 45 - 50%. Left ventricle size is normal. Normal wall thickness. Mild global hypokinesis present. Abnormal diastolic function.   Right Ventricle: Right ventricle is mildly dilated. Mildly reduced systolic function.   Aortic Valve: Tricuspid valve. Mild sclerosis of the aortic valve cusp.   Mitral Valve: Mild regurgitation. Neuro/Psych:   Negative Neuro/Psych ROS              GI/Hepatic/Renal: Neg GI/Hepatic/Renal ROS            Endo/Other: Negative Endo/Other ROS                    Abdominal:             Vascular:   + DVT ( Extensive DVT within the right lower extremity), PE ( bilateral, recurrent PE). Other Findings:           Anesthesia Plan      TIVA     ASA 4     (Prone light tiva)  Induction: intravenous. Anesthetic plan and risks discussed with patient.                         Peter Min MD   3/9/2023

## 2023-03-10 NOTE — PROGRESS NOTES
[facility] Critical Care Note[de-identified] 3/10/2023  [address] [facility]  Admission Date: 3/8/2023     Length of Stay: 2 days    Background: Patient is a 75 y.o.  male seen and evaluated at the request of Dr. Joel Christie. Patient with HTN/prior smoker (quit Nov 2022 with 50 pack years smoking history)/COPD with recent PE in 11/22 and was intermittently on Eliquis came in with SOB and noted to have B/L PE with hypoxemia. Now s/p PE lysis with b/l infusion catheters. Patient transferred to ICU with NRB with saturation at 89-91%. Awake and alert. Not in distress. He also reports takes Testerone injections. No pain. No leg swelling. He reports non-compliance with Eliquis was due to cost and was use baby ASA as outpatient instead. Notable PMH:  has a past medical history of Arthritis, Asthma, Calculus of kidney, Hypercholesterolemia, and Hypertension. 24 Hour events: now on NC. Review of Systems: Comprehensive ROS negative except in HPI     Lines: (insertion date)      Drips: current dose (range)  Dose (units/hr) Heparin: 0 Units/hr     Pertinent Exam:         Blood pressure 122/67, pulse 79, temperature 98.4 °F (36.9 °C), temperature source Oral, resp. rate 16, height 5' 7\" (1.702 m), weight 230 lb (104.3 kg), SpO2 93 %.    Intake/Output Summary (Last 24 hours) at 3/10/2023 0725  Last data filed at 3/10/2023 0300  Gross per 24 hour   Intake 244.07 ml   Output 2050 ml   Net -1805.93 ml     Constitutional:  obese, no distress  EENMT:  Sclera clear, pupils equal, oral mucosa moist  Respiratory: CTA on NC  Cardiovascular:  S1,S2, NSR  Gastrointestinal:  soft with no tenderness; positive bowel sounds present  Musculoskeletal:  warm with no cyanosis, no lower extremity edema, warm  Skin:  no jaundice or ecchymosis  Neurologic:A & O X 3  Psychiatric: calm    Chest CT   noted b/l pe          Acute appearing occlusive bilateral pulmonary emboli with large clot burden and   findings suggestive of right heart strain. Recent Labs     03/08/23  1306 03/08/23  1816 03/08/23  2336 03/09/23  0554 03/09/23  1623 03/10/23  0427   WBC 14.9* 14.4*   < > 11.6* 12.1* 11.2*   HGB 18.3* 15.9   < > 13.8 13.9 13.5*   HCT 56.6* 50.1   < > 44.2 43.7 41.8    146*   < > 104* 98* 95*   INR  --  1.1  --  1.1  --   --    PROCAL <0.05  --   --   --   --   --     < > = values in this interval not displayed. Recent Labs     03/08/23  1306 03/10/23  0427    139   K 4.3 4.2    110   CO2 25 26   GLUCOSE 132* 95   BUN 24* 14   CREATININE 1.40 0.90   MG 2.2  --    BILITOT 0.5  --    AST 23  --    ALT 28  --    ALKPHOS 76  --      Recent Labs     03/08/23  1306   TROPHS 287.1*   NTPROBNP 293*     Recent Labs     03/08/23  1306 03/10/23  0427   GLUCOSE 132* 95      ECHO: 03/08/23    TRANSTHORACIC ECHOCARDIOGRAM (TTE) COMPLETE (CONTRAST/BUBBLE/3D PRN) 03/09/2023 10:55 AM (Final)    Interpretation Summary    Left Ventricle: Mildly reduced left ventricular systolic function with a visually estimated EF of 45 - 50%. Left ventricle size is normal. Normal wall thickness. Mild global hypokinesis present. Abnormal diastolic function. Mitral Valve: Mild regurgitation. Tricuspid Valve: The estimated RVSP is 35 mmHg. Left Atrium: Left atrium is mildly dilated. Right Atrium: Right atrium is mildly dilated. Aorta: Ao root diameter is 3.4 cm. Ao ascending diameter is 3.6 cm. Technical qualifiers: Color flow Doppler was performed and pulse wave and/or continuous wave Doppler was performed. Contrast used: Definity.     Signed by: Josette Miller MD on 3/9/2023 10:55 AM    Microbiology:   Recent Labs     03/08/23  1310 03/08/23  1819   CULTURE NO GROWTH AFTER 16 HOURS NO GROWTH AFTER 11 HOURS     Ventilator Settings Ideal body weight: 66.1 kg (145 lb 11.6 oz)  Adjusted ideal body weight: 81.4 kg (179 lb 6.9 oz)  Mode FIO2 Rate Tidal Volume Pressure        35 %                  Peak airway pressure: Minute ventilation:    ABG:  Recent Labs     03/08/23  1337   PHAPOC 7.45   [id number] 33.7*   AG2VKUN 58*   QZK1QRF 23.5   BE 0.4     Assessment and Plan:  (Medical Decision Making)   Impression: acute large PTE in November 2022 (? Etiology), noncompliance with m,    NEURO:   No issues  CV:   Chronic combined systolic and diastolic congestive heart failure  PULM:   Acute bilateral pulmonary embolism (HCC)  --getting TPA at this time. F/u with IR  --will need to be compliant with final drug regimine either xarelto or eliquis. Note -- NON compliance with tx was likely reason for failure from his prior PE in November. PMD did get hematology and f/u their recommendations. --will need to stop using testosterone shots  Acute respiratory failure with hypoxia ([address]- on NC   History of smoking (50 pack year) and possible COPD. continue xopenex/atrovent and pulmicort. Will need formal PFTs in the future. LE DVT--noted asymmetry of Right calf but does have evidence of varicose veins and prior trauma to left ankle. --secondary to PE   RENAL:  No new issues  GI:   Nutrition: npo  HEME:   Anticoagulation: heparin drip   ID:   No issues  ENDO:   DM: no issues  Skin: no decub, turns, preventive care    On minimal oxygen, plans for thrombolysis. Heparin drip. Oral anticoagulation and compliance needed. Stopping testosterone injections. Needs O/P f/u with PFTs and PSG. Okay to transfer to the floor from our perspective. Full Code    In this split/shared evaluation I performed performed a medically appropriate history and exam, counseled and educated the patient and/or family member, ordered medications, tests or procedures, documented information in EMR, and coordinated care. which accounted for 15 clinical time. ARTURO Mirza - CNP    In this split/shared evaluation I performed reviewed the patients's H&P, available images, labs, cultures. , discussed case in detail with NPP, performed a medically appropriate history and exam, counseled and educated the patient and/or family member, ordered and/or reviewed medications, tests or procedures, documented information in EMR, independently interpreted images, and coordinated care. which accounted for 16 minutes clinical time. Impression: 76 y/o male with recurrent VTE, compliance issues, testosterone supplementation. Pending IR address of DVT. Weaned to NC and improved. Can leave ICU from my standpoint (may depend on IR needs for their procedure) and can transition to [address] [place] [address] when okay with IR. Continue heparin gtt. Will need follow up with us for outpatient PFTs and PSG. Will see on Monday if still admitted, call with questions.      Violette Hi MD

## 2023-03-10 NOTE — PLAN OF CARE
Problem: Respiratory - Adult  Goal: Achieves optimal ventilation and oxygenation  Outcome: Progressing  Flowsheets (Taken 3/10/2023 9339 by Gricel Gonzalez RN)  Achieves optimal ventilation and oxygenation:   Assess for changes in mentation and behavior   Assess for changes in respiratory status   Position to facilitate oxygenation and minimize respiratory effort   Oxygen supplementation based on oxygen saturation or arterial blood gases   Respiratory therapy support as indicated

## 2023-03-10 NOTE — PROGRESS NOTES
visited with the patient as he was on his way to a procedure      Val Marin MDIV, [facility]       .

## 2023-03-11 VITALS
HEIGHT: 67 IN | BODY MASS INDEX: 36.1 KG/M2 | SYSTOLIC BLOOD PRESSURE: 120 MMHG | RESPIRATION RATE: 19 BRPM | DIASTOLIC BLOOD PRESSURE: 87 MMHG | WEIGHT: 230 LBS | OXYGEN SATURATION: 95 % | HEART RATE: 87 BPM | TEMPERATURE: 97.9 F

## 2023-03-11 LAB
ANION GAP SERPL CALC-SCNC: 1 MMOL/L (ref 2–11)
B PERT DNA SPEC QL NAA+PROBE: NOT DETECTED
BASOPHILS # BLD: 0 K/UL (ref 0–0.2)
BASOPHILS NFR BLD: 0 % (ref 0–2)
BORDETELLA PARAPERTUSSIS BY PCR: NOT DETECTED
BUN SERPL-MCNC: 13 MG/DL (ref 8–23)
C PNEUM DNA SPEC QL NAA+PROBE: NOT DETECTED
CALCIUM SERPL-MCNC: 8.8 MG/DL (ref 8.3–10.4)
CHLORIDE SERPL-SCNC: 109 MMOL/L (ref 101–110)
CO2 SERPL-SCNC: 27 MMOL/L (ref 21–32)
CREAT SERPL-MCNC: 0.9 MG/DL (ref 0.8–1.5)
DIFFERENTIAL METHOD BLD: ABNORMAL
EOSINOPHIL # BLD: 0.2 K/UL (ref 0–0.8)
EOSINOPHIL NFR BLD: 2 % (ref 0.5–7.8)
ERYTHROCYTE [DISTWIDTH] IN BLOOD BY AUTOMATED COUNT: 13.7 % (ref 11.9–14.6)
FLUAV SUBTYP SPEC NAA+PROBE: NOT DETECTED
FLUBV RNA SPEC QL NAA+PROBE: NOT DETECTED
GLUCOSE SERPL-MCNC: 99 MG/DL (ref 65–100)
HADV DNA SPEC QL NAA+PROBE: NOT DETECTED
HCOV 229E RNA SPEC QL NAA+PROBE: NOT DETECTED
HCOV HKU1 RNA SPEC QL NAA+PROBE: NOT DETECTED
HCOV NL63 RNA SPEC QL NAA+PROBE: NOT DETECTED
HCOV OC43 RNA SPEC QL NAA+PROBE: NOT DETECTED
HCT VFR BLD AUTO: 43.3 % (ref 41.1–50.3)
HGB BLD-MCNC: 13.9 G/DL (ref 13.6–17.2)
HMPV RNA SPEC QL NAA+PROBE: NOT DETECTED
HPIV1 RNA SPEC QL NAA+PROBE: NOT DETECTED
HPIV2 RNA SPEC QL NAA+PROBE: NOT DETECTED
HPIV3 RNA SPEC QL NAA+PROBE: NOT DETECTED
HPIV4 RNA SPEC QL NAA+PROBE: NOT DETECTED
IMM GRANULOCYTES # BLD AUTO: 0.2 K/UL (ref 0–0.5)
IMM GRANULOCYTES NFR BLD AUTO: 2 % (ref 0–5)
LYMPHOCYTES # BLD: 1.5 K/UL (ref 0.5–4.6)
LYMPHOCYTES NFR BLD: 13 % (ref 13–44)
M PNEUMO DNA SPEC QL NAA+PROBE: NOT DETECTED
MCH RBC QN AUTO: 31.3 PG (ref 26.1–32.9)
MCHC RBC AUTO-ENTMCNC: 32.1 G/DL (ref 31.4–35)
MCV RBC AUTO: 97.5 FL (ref 82–102)
MONOCYTES # BLD: 0.9 K/UL (ref 0.1–1.3)
MONOCYTES NFR BLD: 8 % (ref 4–12)
NEUTS SEG # BLD: 8.6 K/UL (ref 1.7–8.2)
NEUTS SEG NFR BLD: 75 % (ref 43–78)
NRBC # BLD: 0 K/UL (ref 0–0.2)
PLATELET # BLD AUTO: 118 K/UL (ref 150–450)
PMV BLD AUTO: 10.9 FL (ref 9.4–12.3)
POTASSIUM SERPL-SCNC: 4 MMOL/L (ref 3.5–5.1)
RBC # BLD AUTO: 4.44 M/UL (ref 4.23–5.6)
RSV RNA SPEC QL NAA+PROBE: NOT DETECTED
RV+EV RNA SPEC QL NAA+PROBE: NOT DETECTED
SARS-COV-2 RNA RESP QL NAA+PROBE: NOT DETECTED
SODIUM SERPL-SCNC: 137 MMOL/L (ref 133–143)
UFH PPP CHRO-ACNC: 0.55 IU/ML (ref 0.3–0.7)
WBC # BLD AUTO: 11.4 K/UL (ref 4.3–11.1)

## 2023-03-11 PROCEDURE — 85520 HEPARIN ASSAY: CPT

## 2023-03-11 PROCEDURE — 2700000000 HC OXYGEN THERAPY PER DAY

## 2023-03-11 PROCEDURE — 97161 PT EVAL LOW COMPLEX 20 MIN: CPT

## 2023-03-11 PROCEDURE — 0202U NFCT DS 22 TRGT SARS-COV-2: CPT

## 2023-03-11 PROCEDURE — 6360000002 HC RX W HCPCS: Performed by: INTERNAL MEDICINE

## 2023-03-11 PROCEDURE — 36415 COLL VENOUS BLD VENIPUNCTURE: CPT

## 2023-03-11 PROCEDURE — 94760 N-INVAS EAR/PLS OXIMETRY 1: CPT

## 2023-03-11 PROCEDURE — 94761 N-INVAS EAR/PLS OXIMETRY MLT: CPT

## 2023-03-11 PROCEDURE — 94640 AIRWAY INHALATION TREATMENT: CPT

## 2023-03-11 PROCEDURE — 80048 BASIC METABOLIC PNL TOTAL CA: CPT

## 2023-03-11 PROCEDURE — 85025 COMPLETE CBC W/AUTO DIFF WBC: CPT

## 2023-03-11 PROCEDURE — 97530 THERAPEUTIC ACTIVITIES: CPT

## 2023-03-11 PROCEDURE — 2580000003 HC RX 258: Performed by: INTERNAL MEDICINE

## 2023-03-11 PROCEDURE — 6370000000 HC RX 637 (ALT 250 FOR IP): Performed by: INTERNAL MEDICINE

## 2023-03-11 RX ORDER — CARVEDILOL 12.5 MG/1
12.5 TABLET ORAL 2 TIMES DAILY WITH MEALS
Qty: 60 TABLET | Refills: 3 | Status: SHIPPED | OUTPATIENT
Start: 2023-03-11

## 2023-03-11 RX ADMIN — IPRATROPIUM BROMIDE 0.5 MG: 0.5 SOLUTION RESPIRATORY (INHALATION) at 07:32

## 2023-03-11 RX ADMIN — BUDESONIDE 500 MCG: 0.5 INHALANT RESPIRATORY (INHALATION) at 07:32

## 2023-03-11 RX ADMIN — HEPARIN SODIUM 15 UNITS/KG/HR: 10000 INJECTION, SOLUTION INTRAVENOUS at 00:27

## 2023-03-11 RX ADMIN — APIXABAN 10 MG: 5 TABLET, FILM COATED ORAL at 08:56

## 2023-03-11 RX ADMIN — CARVEDILOL 12.5 MG: 12.5 TABLET, FILM COATED ORAL at 08:56

## 2023-03-11 RX ADMIN — ALBUTEROL SULFATE 2.5 MG: 2.5 SOLUTION RESPIRATORY (INHALATION) at 07:32

## 2023-03-11 RX ADMIN — SODIUM CHLORIDE, PRESERVATIVE FREE 10 ML: 5 INJECTION INTRAVENOUS at 08:57

## 2023-03-11 NOTE — PROGRESS NOTES
Patient to be discharged home today. IV's taken out with no difficulty. Discharge paperwork given to patient with verbal understanding with time given for questions and answers. Family to transport patient home. E-Sign completed.

## 2023-03-11 NOTE — PROGRESS NOTES
RRT Clinical Rounding Nurse Update    Vitals:    03/11/23 0339 03/11/23 0721 03/11/23 0732 03/11/23 1150   BP: (!) 142/80 139/83  120/87   Pulse: 94 88 91 87   Resp: 20 19 19 19   Temp: 98.2 °F (36.8 °C) 98.6 °F (37 °C)  97.9 °F (36.6 °C)   TempSrc: Oral Oral  Oral   SpO2: 95% 93% 95% 95%   Weight:       Height:            DETERIORATION INDEX SCORE: 30    ASSESSMENT:  Previous outreach assessment was reviewed. There have been no significant changes since previous assessment. PLAN:  Will follow per RRT Clinical Rounding Program protocol.     Bob Rodarte, [address]: [facility]: [phone]

## 2023-03-11 NOTE — PROGRESS NOTES
AAOx4, NAD verbalized/observed. Dressing to R side of neck reinforced. Heparin infusing, pt tolerating well. Urinal at bedside. Call light and telephone within reach. Will con't to monitor pt.

## 2023-03-11 NOTE — PLAN OF CARE
Problem: Discharge Planning  Goal: Discharge to home or other facility with appropriate resources  Outcome: Progressing     Problem: Safety - Adult  Goal: Free from fall injury  Outcome: Progressing  Flowsheets (Taken 3/10/2023 1600 by Charlie Diez RN)  Free From Fall Injury: Instruct family/caregiver on patient safety     Problem: Skin/Tissue Integrity  Goal: Absence of new skin breakdown  Description: 1. Monitor for areas of redness and/or skin breakdown  2. Assess vascular access sites hourly  3. Every 4-6 hours minimum:  Change oxygen saturation probe site  4. Every 4-6 hours:  If on nasal continuous positive airway pressure, respiratory therapy assess nares and determine need for appliance change or resting period.   Outcome: Progressing     Problem: Neurosensory - Adult  Goal: Achieves stable or improved neurological status  Outcome: Progressing  Goal: Absence of seizures  Outcome: Progressing  Goal: Remains free of injury related to seizures activity  Outcome: Progressing  Goal: Achieves maximal functionality and self care  Outcome: Progressing     Problem: Respiratory - Adult  Goal: Achieves optimal ventilation and oxygenation  Outcome: Progressing     Problem: Cardiovascular - Adult  Goal: Maintains optimal cardiac output and hemodynamic stability  Outcome: Progressing  Goal: Absence of cardiac dysrhythmias or at baseline  Outcome: Progressing     Problem: Skin/Tissue Integrity - Adult  Goal: Skin integrity remains intact  Outcome: Progressing  Flowsheets (Taken 3/10/2023 1600 by Charlie Diez RN)  Skin Integrity Remains Intact: Monitor for areas of redness and/or skin breakdown  Goal: Incisions, wounds, or drain sites healing without S/S of infection  Outcome: Progressing  Goal: Oral mucous membranes remain intact  Outcome: Progressing     Problem: Musculoskeletal - Adult  Goal: Return mobility to safest level of function  Outcome: Progressing  Goal: Maintain proper alignment of affected body part  Outcome: Progressing  Goal: Return ADL status to a safe level of function  Outcome: Progressing     Problem: Gastrointestinal - Adult  Goal: Minimal or absence of nausea and vomiting  Outcome: Progressing  Goal: Maintains or returns to baseline bowel function  Outcome: Progressing  Goal: Maintains adequate nutritional intake  Outcome: Progressing     Problem: Genitourinary - Adult  Goal: Absence of urinary retention  Outcome: Progressing     Problem: Infection - Adult  Goal: Absence of infection at discharge  Outcome: Progressing  Goal: Absence of infection during hospitalization  Outcome: Progressing  Goal: Absence of fever/infection during anticipated neutropenic period  Outcome: Progressing     Problem: Metabolic/Fluid and Electrolytes - Adult  Goal: Electrolytes maintained within normal limits  Outcome: Progressing  Goal: Hemodynamic stability and optimal renal function maintained  Outcome: Progressing  Goal: Glucose maintained within prescribed range  Outcome: Progressing     Problem: Hematologic - Adult  Goal: Maintains hematologic stability  Outcome: Progressing     Problem: ABCDS Injury Assessment  Goal: Absence of physical injury  Outcome: Progressing  Flowsheets (Taken 3/10/2023 1600 by Ashley Leal RN)  Absence of Physical Injury: Implement safety measures based on patient assessment     Problem: Pain  Goal: Verbalizes/displays adequate comfort level or baseline comfort level  Outcome: Progressing

## 2023-03-11 NOTE — PROGRESS NOTES
Pt AAOx4, dressing to R calf c/d/I. Neck dressing c/d/I after reinforcement. Heparin infusing, pt tolerating well. No complaints at this time. Will con't to monitor pt.

## 2023-03-11 NOTE — PROGRESS NOTES
RRT Clinical Rounding Nurse Update    Vitals:    03/10/23 1557 03/10/23 1928 03/10/23 1945 03/10/23 2242   BP: 133/83  135/80 (!) 147/84   Pulse: 80 81 94 (!) 106   Resp: 20 18 24 22   Temp: 97.9 °F (36.6 °C)  97.9 °F (36.6 °C) 99.1 °F (37.3 °C)   TempSrc: Oral  Oral Oral   SpO2: 97% 95% 94% 91%   Weight:       Height:            DETERIORATION INDEX SCORE: 34    ASSESSMENT:  Previous outreach assessment was reviewed. There have been no significant changes since previous assessment. PLAN:  Will follow per RRT Clinical Rounding Program protocol.     Zoe Ramirez, [address]: [facility]: [phone]

## 2023-03-11 NOTE — PROGRESS NOTES
RRT Clinical Rounding Nurse Progress Report      SUBJECTIVE: Patient assessed secondary to transfer from critical care. Vitals:    03/10/23 1405 03/10/23 1557 03/10/23 1928 03/10/23 1945   BP:  133/83  135/80   Pulse: 80 80 81 94   Resp: (!) 40 20 18 24   Temp:  97.9 °F (36.6 °C)  97.9 °F (36.6 °C)   TempSrc:  Oral  Oral   SpO2: (!) 89% 97% 95% 94%   Weight:       Height:            DETERIORATION INDEX SCORE: 33    ASSESSMENT:  Pt is A&O x4 and appears to be in NAD at this time. O2 sat 94% on 4L NC. Heparin gtt infusing. Pt denies any pain and voices no complaints. PLAN:  Will follow per RRT Clinical Rounding Program protocol.     Eurm Lakhani, [address]: [facility]: [phone]

## 2023-03-11 NOTE — DISCHARGE INSTRUCTIONS
DISCHARGE SUMMARY from Nurse    PATIENT INSTRUCTIONS:    What to do at Home:  Recommended activity: activity as tolerated. If you experience any of the following symptoms shortness of breath or chest pain, please follow up with PCP. *  Please give a list of your current medications to your Primary Care Provider. *  Please update this list whenever your medications are discontinued, doses are      changed, or new medications (including over-the-counter products) are added. *  Please carry medication information at all times in case of emergency situations. These are general instructions for a healthy lifestyle:    No smoking/ No tobacco products/ Avoid exposure to second hand smoke  Surgeon General's Warning:  Quitting smoking now greatly reduces serious risk to your health. Obesity, smoking, and sedentary lifestyle greatly increases your risk for illness    A healthy diet, regular physical exercise & weight monitoring are important for maintaining a healthy lifestyle    You may be retaining fluid if you have a history of heart failure or if you experience any of the following symptoms:  Weight gain of 3 pounds or more overnight or 5 pounds in a week, increased swelling in our hands or feet or shortness of breath while lying flat in bed. Please call your doctor as soon as you notice any of these symptoms; do not wait until your next office visit. The discharge information has been reviewed with the patient. The patient verbalized understanding. Discharge medications reviewed with the patient and appropriate educational materials and side effects teaching were provided.   ___________________________________________________________________________________________________________________________________

## 2023-03-11 NOTE — PROGRESS NOTES
This RN called lab to inquire about phlebotomist drawing the STAT Anti-Xa, unfractionated heparin lab, but no one answered the lab telephone. The lab was ordered to be drawn timed at 2300 but was modified to STAT when the lab became overdue. Primary RN, Nishi, informed this RN that she called lab approximately 15 minutes ago and they informed her they \"are on the way\".

## 2023-03-11 NOTE — PROGRESS NOTES
ACUTE PHYSICAL THERAPY GOALS:   (Developed with and agreed upon by patient and/or caregiver. )    LTG:  (1.)Mr. Misael Dupree will move from supine to sit and sit to supine , scoot up and down, and roll side to side in bed with INDEPENDENT within 4 treatment day(s). (2.)Mr. Misael Dupree will transfer from bed to chair and chair to bed with INDEPENDENT using the least restrictive device within 4 treatment day(s). (3.)Mr. Misael Dupree will ambulate with INDEPENDENT for 500 feet with the least restrictive device within 4 treatment day(s). ________________________________________________________________________________________________     PHYSICAL THERAPY Initial Assessment and PM  (Link to Caseload Tracking: PT Visit Days : 1  Acknowledge Orders  Time In/Out  PT Charge Capture  Rehab Caseload Tracker    Nora Frank is a 75 y.o. male   PRIMARY DIAGNOSIS: Bilateral pulmonary embolism ([address]  Bilateral pulmonary embolism (HCC) [I26.99]  Acute respiratory failure with hypoxia ([address] [J96.01]  Other acute pulmonary embolism with acute cor pulmonale ([facility] [I26.09]       Reason for Referral: Generalized Muscle Weakness (M62.81)  Other lack of cordination (R27.8)  Difficulty in walking, Not elsewhere classified (R26.2)  Other abnormalities of gait and mobility (R26.89)  Inpatient: Payor: [address] [place] [address] / Plan: [employer] / Product Type: *No Product type* /     ASSESSMENT:     REHAB RECOMMENDATIONS:   Recommendation to date pending progress:  Setting:  No further skilled therapy after discharge from hospital    Equipment:    None     ASSESSMENT:  Mr. Misael Dupree is a pleasant retired male with above diagnosis who demonstrates with decreased transfers, ambulation and mobility below his prior functional baseline.    All transfers are currently limited at [address] x 1 out of bed to sit and stand followed by ambulation for 250 feet with the deficits stated below and limping with antalgic knee pain \"bone on bone, but not able to get a knee replacement secondary to blood thinners\". Yaz Evans then returns to room and is seated in the bedside chair with good seated balance. He also demonstrates good standing balance. Skilled PT is indicated for this patient's functional mobility deficits. [facility] [address] [address] AM-PAC 6 Clicks Basic Mobility Inpatient Short Form  AM-PAC Basic Mobility - Inpatient   How much help is needed turning from your back to your side while in a flat bed without using bedrails?: None  How much help is needed moving from lying on your back to sitting on the side of a flat bed without using bedrails?: None  How much help is needed moving to and from a bed to a chair?: None  How much help is needed standing up from a chair using your arms?: None  How much help is needed walking in hospital room?: None  How much help is needed climbing 3-5 steps with a railing?: A Little  [facility] Inpatient Mobility Raw Score : 23  AM-PAC Inpatient T-Scale Score : 56.93  Mobility Inpatient CMS 0-100% Score: 11.2  Mobility Inpatient CMS G-Code Modifier : CI    SUBJECTIVE:   Mr. Jasmine Ruvalcaba states, \"I think I am going home today. I need a knee replace  ment \"     Social/Functional Lives With: Spouse (spouse/stepson)  Type of Home: House  Home Layout: One level  Home Access: Stairs to enter with rails  Entrance Stairs - Number of Steps: 5  Home Equipment: None  Receives Help From: Family  ADL Assistance: Independent  Homemaking Assistance: Independent  Ambulation Assistance: Independent  Active : Yes  Occupation: Retired    OBJECTIVE:     PAIN: VITALS / O2: Severa [place] / [place] / DRAINS:   Pre Treatment: 0/10 no pain reported. At rest.   5/10 in the knee with ambulation. Post Treatment: no specific pain reported post treatment.     Vitals        Oxygen      None    RESTRICTIONS/PRECAUTIONS:  Restrictions/Precautions: None                 GROSS EVALUATION: Intact Impaired (Comments):   AROM [x]     PROM [x] Strength [x]     Balance [x]     Posture []    Sensation [x]     Coordination [x]      Tone [x]     Edema [x]    Activity Tolerance [x]   Good     []      COGNITION/  PERCEPTION: Intact Impaired (Comments):   Orientation [x]     Vision [x]     Hearing [x]     Cognition  [x]       MOBILITY: I Mod I S SBA CGA Min Mod Max Total  NT x2 Comments:   Bed Mobility    Rolling [] [x] [] [] [] [] [] [] [] [] []    Supine to Sit [] [x] [] [] [] [] [] [] [] [] []    Scooting [] [x] [] [] [] [] [] [] [] [] []    Sit to Supine [] [] [] [] [] [] [] [] [] [x] []    Transfers    Sit to Stand [] [x] [] [] [] [] [] [] [] [] []    Bed to Chair [] [x] [] [] [] [] [] [] [] [] []    Stand to Sit [] [x] [] [] [] [] [] [] [] [] []     [] [] [] [] [] [] [] [] [] [] []    I=Independent, Mod I=Modified Independent, S=Supervision, SBA=Standby Assistance, CGA=Contact Guard Assistance,   Min=Minimal Assistance, Mod=Moderate Assistance, Max=Maximal Assistance, Total=Total Assistance, NT=Not Tested    GAIT: I Mod I S SBA CGA Min Mod Max Total  NT x2 Comments:   Level of Assistance [] [x] [] [] [] [] [] [] [] [] []    Distance 250 feet    DME None    Gait Quality Decreased yesi , Decreased step clearance, Decreased step length, and Decreased stance    Weightbearing Status Restrictions/Precautions  Restrictions/Precautions: None    Stairs      I=Independent, Mod I=Modified Independent, S=Supervision, SBA=Standby Assistance, CGA=Contact Guard Assistance,   Min=Minimal Assistance, Mod=Moderate Assistance, Max=Maximal Assistance, Total=Total Assistance, NT=Not Tested    PLAN:   FREQUENCY AND DURATION: 3 times/week for duration of hospital stay or until stated goals are met, whichever comes first.    THERAPY PROGNOSIS: Good    PROBLEM LIST:   (Skilled intervention is medically necessary to address:)  Decreased Activity Tolerance  Decreased AROM/PROM  Decreased Balance  Decreased Cognition  Decreased Coordination  Decreased Gait Ability  Decreased Safety Awareness  Decreased Transfer Abilities INTERVENTIONS PLANNED:   (Benefits and precautions of physical therapy have been discussed with the patient.)  Therapeutic Activity  Therapeutic Exercise/HEP  Neuromuscular Re-education  Gait Training       TREATMENT:   EVALUATION: LOW COMPLEXITY: (Untimed Charge)    TREATMENT:   Therapeutic Activity (23 Minutes): Therapeutic activity included Rolling, Supine to Sit, Sit to Supine, Scooting, Lateral Scooting, Transfer Training, Ambulation on level ground, Sitting balance , and Standing balance to improve functional Activity tolerance, Balance, Coordination, Mobility, and Strength.     TREATMENT GRID:  N/A    AFTER TREATMENT PRECAUTIONS: Bed/Chair Locked, Call light within reach, Chair, Needs within reach, RN notified, and Side rails x3    INTERDISCIPLINARY COLLABORATION:  RN/ PCT and PT/ PTA    EDUCATION: Education Given To: Patient  Education Provided: Role of Therapy  Education Method: Verbal  Barriers to Learning: None  Education Outcome: Verbalized understanding    TIME IN/OUT:  Time In: 1345  Time Out: [address]  Minutes: [place], [place]

## 2023-03-11 NOTE — CARE COORDINATION
Patient will be d/c home today. Patient has no needs identified at being d/c home today. Family will transport home. Patient has met all treatment goals / milestones. CM will continue to monitor and remain available for any needs that may occur. 03/09/23 2859   Service Assessment   Patient Orientation Alert and Oriented   Cognition Alert   History Provided By Medical Record   Primary Caregiver Self   Support Systems Spouse/Significant Other;Family Members; Natali Lang [phone] is: Legal Next of Kin   PCP Verified by CM Yes   Prior Functional Level Independent in ADLs/IADLs   Current Functional Level Other (see comment)   Can patient return to prior living arrangement Yes   Ability to make needs known: Good   Family able to assist with home care needs: Other (comment)   Would you like for me to discuss the discharge plan with any other family members/significant others, and if so, who? Yes   Financial Resources Medicare  ([facility])   Community Resources   (none)   CM/SW Referral   (none)   Social/Functional History   Lives With Spouse  (spouse/stepson)   Type of [address] One level   Home Access Stairs to enter with rails   Entrance Stairs - Number of Steps [address] [place] [address] Help From [facility]   Active  Yes   Occupation Retired   Discharge Planning   Type of [address] Prior To Admission None   Patient expects to be discharged to: [address] Discharge   Confirm Follow Up Transport Family   Condition of Participation: Discharge Planning   Freedom of Choice list was provided with basic dialogue that supports the patient's individualized plan of care/goals, treatment preferences, and shares the quality data associated with the providers?   Yes

## 2023-03-11 NOTE — DISCHARGE SUMMARY
Hospitalist Discharge Summary   Admit Date:  3/8/2023  1:04 PM   DC Note date: 3/11/2023  Name:  Nieves Edwards   Age:  75 y.o. Sex:  male  :  1955   MRN:  [id number]   Room:  [facility]  PCP:  Trice Bedoya MD    Presenting Complaint: Dizziness and Extremity Weakness     Initial Admission Diagnosis: Bilateral pulmonary embolism (HCC) [I26.99]  Acute respiratory failure with hypoxia ([address] [J96.01]  Other acute pulmonary embolism with acute cor pulmonale (HCC) [I26.09]     Problem List for this Hospitalization (present on admission):    Principal Problem:    Bilateral pulmonary embolism ([address]  Active Problems:    Sinus tachycardia    Acute pulmonary embolism, unspecified pulmonary embolism type, unspecified whether acute cor pulmonale present (HCC)    Chronic combined systolic and diastolic congestive heart failure (HCC)    LUE weakness    Acute respiratory failure with hypoxia (HCC)    Pain in both lower extremities    Pulmonary embolus (HCC)    Leg swelling    Testosterone adverse reaction    Non-compliance    PAD (peripheral artery disease) ([address]    Essential hypertension    Coronary artery disease involving native coronary artery of native heart without angina pectoris  Resolved Problems:    * No resolved hospital problems. [facility] Course:  Please refer to the admission H&P for details of presentation. In summary, Nieves Edwards is a 75 y.o. male with medical history of PAD, hypertension, CAD, hyperlipidemia, bilateral PE status post thrombectomy and RLE DVT on eliquis who presented with acute onset of dyspnea on exertion that started this AM. Also reporting B/l LE weakness with muscle cramps around his calves as well as LUE weakness w/o numbness or tingling. Reports missing a few doses of liquids as well as being on testosterone supplements.      In the Emergency room, patient was tachycardic, tachypneic and saturating 87% on room air requiring 15 L O2 nasal cannula to maintain his saturation. CT chest shows acute appearing occlusive bilateral pulmonary emboli with large clot burden and findings suggestive of right heart strain. IR consulted and underwent lysis of PE on 3/9. Ultrasound lower extremity with extensive right lower extremity DVT. Patient with successful thrombectomy on 3/10 with IR. Respiratory failure with hypoxia has been resolved. Patient now weaned to room air and will not need any oxygen on ambulation. Will need to follow-up with pulmonary for outpatient PFT and PSG. He has been educated on medical compliance as well as cessation of his testosterone supplementation. Patient is medically stable for discharge. Patient is to continue taking medications as prescribed and to follow up with PCP on discharge. Patient is instructed to to call a physician or return to ED if any concerns/symptoms worsened. Discharge summary and encounter summary was sent to PCP electronically via \"Comm Mgt\" link in [facility], if possible. Disposition: Home  Diet: ADULT DIET; Regular  Code Status: Full Code    Follow Ups:   Follow-up Information     Tobin Blair MD Follow up in 1 week(s). Specialty: Family Medicine  Why: As needed  Contact information:  [address] [phone]                       Time spent in patient discharge and coordination 40 minutes. Plan was discussed with patient. All questions answered. Patient was stable at time of discharge. Instructions given to call a physician or return if any concerns.     Current Discharge Medication List        START taking these medications    Details   carvedilol (COREG) 12.5 MG tablet Take 1 tablet by mouth 2 times daily (with meals)  Qty: 60 tablet, Refills: 3           CONTINUE these medications which have CHANGED    Details   !! apixaban (ELIQUIS) 5 MG TABS tablet Take 2 tablets by mouth 2 times daily for 13 doses  Qty: 26 tablet, Refills: 0      !! apixaban (ELIQUIS) 5 MG TABS tablet Take 1 tablet by mouth 2 times daily  Qty: 60 tablet, Refills: 2       !! - Potential duplicate medications found. Please discuss with provider. CONTINUE these medications which have NOT CHANGED    Details   aspirin 81 MG EC tablet Take 81 mg by mouth daily      atorvastatin (LIPITOR) 80 MG tablet TAKE 1 TABLET EVERY NIGHT      traMADol (ULTRAM) 50 MG tablet Take 50 mg by mouth every 6 hours as needed. STOP taking these medications       metoprolol succinate (TOPROL XL) 25 MG extended release tablet Comments:   Reason for Stopping:         nicotine (NICODERM CQ) 7 MG/24HR Comments:   Reason for Stopping:         tiotropium (SPIRIVA RESPIMAT) 2.5 MCG/ACT AERS inhaler Comments:   Reason for Stopping:               Some medications may have been reported old/obsolete and marked \"stop taking\" by the system; in reality pt was already off these meds; defer to outpatient or prescribing providers. Procedures done this admission:  * No surgery found *    Consults this admission:  IP CONSULT TO HEMATOLOGY  IP CONSULT TO INTERVENTIONAL RADIOLOGY    Echocardiogram results:  03/08/23    TRANSTHORACIC ECHOCARDIOGRAM (TTE) COMPLETE (CONTRAST/BUBBLE/3D PRN) 03/09/2023 10:55 AM, 03/09/2023 12:00 AM (Final)    Interpretation Summary    Left Ventricle: Mildly reduced left ventricular systolic function with a visually estimated EF of 45 - 50%. Left ventricle size is normal. Normal wall thickness. Mild global hypokinesis present. Abnormal diastolic function. Mitral Valve: Mild regurgitation. Tricuspid Valve: The estimated RVSP is 35 mmHg. Left Atrium: Left atrium is mildly dilated. Right Atrium: Right atrium is mildly dilated. Aorta: Ao root diameter is 3.4 cm. Ao ascending diameter is 3.6 cm. Technical qualifiers: Color flow Doppler was performed and pulse wave and/or continuous wave Doppler was performed. Contrast used: Definity.     Signed by: Hendrick Meckel, MD on 3/9/2023 10:55 AM, Signed by: Unknown Provider Result on 3/9/2023 12:00 AM      Diagnostic Imaging/Tests:   CT CHEST PULMONARY EMBOLISM W CONTRAST    Result Date: 3/8/2023  Acute appearing occlusive bilateral pulmonary emboli with large clot burden and findings suggestive of right heart strain. Former[facility] The critical results contained in this report were communicated to Dr. Camelia Amos by Dr. Marissa Dillon over the phone at 3/8/2023 1:41 PM. Critical results were communicated as outlined in Section II.C.2.a.i of the ACR Practice Guideline for Communication of Diagnostic Imaging Findings. IR TRANSCATH INFUSION THROMB NONCORONARY    Result Date: 3/9/2023  1. Extensive central pulmonary embolism. Pharmacologic thrombolysis will be initiated. Plan: ICU observation. IR GUIDED THROMB DAUTERIVE HOSPITAL VEIN    Result Date: 3/10/2023  1. Extensive right lower extremity DVT. 2. Successful mechanical thrombectomy. Plan: Continue anticoagulation.  Would recommend transitioned to oral anticoagulation tonight or tomorrow as determined by the primary team. Follow up with me in clinic in one month       Labs: Results:       BMP, Mg, Phos Recent Labs     03/08/23  1306 03/10/23  0427 03/11/23  0404    139 137   K 4.3 4.2 4.0    110 109   CO2 25 26 27   ANIONGAP 6 3 1*   BUN 24* 14 13   CREATININE 1.40 0.90 0.90   LABGLOM 55* >60 >60   CALCIUM 11.1* 8.7 8.8   GLUCOSE 132* 95 99   MG 2.2  --   --       CBC Recent Labs     03/08/23  1306 03/08/23  1816 03/09/23  1623 03/10/23  0427 03/11/23  0404   WBC 14.9*   < > 12.1* 11.2* 11.4*   RBC 5.83*   < > 4.39 4.26 4.44   HGB 18.3*   < > 13.9 13.5* 13.9   HCT 56.6*   < > 43.7 41.8 43.3   MCV 97.1   < > 99.5 98.1 97.5   MCH 31.4   < > 31.7 31.7 31.3   MCHC 32.3   < > 31.8 32.3 32.1   RDW 13.9   < > 13.8 13.9 13.7      < > 98* 95* 118*   MPV 10.3   < > 10.7 10.6 10.9   NRBC 0.00   < > 0.00 0.00 0.00   SEGS 78  --   --  76 75   LYMPHOPCT 11*  --   --  12* 13   EOSRELPCT 1  --   --  3 2   MONOPCT 7  --   --  7 8 BASOPCT 1  --   --  0 0   IMMGRAN 2  --   --  1 2   SEGSABS 11.9*  --   --  8.5* 8.6*   LYMPHSABS 1.6  --   --  1.4 1.5   EOSABS 0.1  --   --  0.3 0.2   MONOSABS 1.0  --   --  0.8 0.9   BASOSABS 0.1  --   --  0.0 0.0   ABSIMMGRAN 0.2  --   --  0.2 0.2    < > = values in this interval not displayed.       LFT Recent Labs     03/08/23  1306   BILITOT 0.5   ALKPHOS 76   AST 23   ALT 28   PROT 8.5*   LABALBU 3.6   GLOB 4.9*      Cardiac  Lab Results   Component Value Date/Time    NTPROBNP 293 03/08/2023 01:06 PM    NTPROBNP 4,319 11/07/2022 09:36 PM    TROPHS 287.1 03/08/2023 01:06 PM    TROPHS 1,087.4 11/07/2022 09:36 PM      Coags Lab Results   Component Value Date/Time    PROTIME 14.2 03/10/2023 08:34 AM    PROTIME 14.7 03/09/2023 05:54 AM    PROTIME 14.7 03/08/2023 06:16 PM    INR 1.1 03/10/2023 08:34 AM    INR 1.1 03/09/2023 05:54 AM    INR 1.1 03/08/2023 06:16 PM    APTT 180.2 03/10/2023 04:23 PM    APTT 31.7 03/10/2023 08:34 AM    APTT 35.7 03/09/2023 05:54 AM      A1c Lab Results   Component Value Date/Time    LABA1C 6.1 11/08/2022 03:21 AM     11/08/2022 03:21 AM      Lipids Lab Results   Component Value Date/Time    CHOL 148 06/02/2021 03:15 PM    LDLCALC 83 06/02/2021 03:15 PM    LABVLDL 29 03/09/2020 12:04 PM    HDL 32 06/02/2021 03:15 PM    TRIG 193 06/02/2021 03:15 PM      Thyroid  No results found for: Heather Ryan     Most Recent UA Lab Results   Component Value Date/Time    GLUCOSEU Negative 11/07/2022 04:29 PM    BLOODU Negative 11/07/2022 04:29 PM        Recent Labs     03/08/23  1819 03/08/23  1310   CULTURE NO GROWTH 3 DAYS NO GROWTH 3 DAYS       All Labs from Last 24 Hrs:  Recent Results (from the past 24 hour(s))   Anti-Xa, Unfractionated Heparin    Collection Time: 03/10/23  3:16 PM   Result Value Ref Range    Anti-XA Unfrac Heparin >1.1 (H) 0.3 - 0.7 IU/mL   APTT    Collection Time: 03/10/23  4:23 PM   Result Value Ref Range    .2 (HH) 24.5 - 34.2 SEC   Anti-Xa, Unfractionated Heparin    Collection Time: 03/11/23  1:04 AM   Result Value Ref Range    Anti-XA Unfrac Heparin 0.55 0.3 - 0.7 IU/mL   CBC with Auto Differential    Collection Time: 03/11/23  4:04 AM   Result Value Ref Range    WBC 11.4 (H) 4.3 - 11.1 K/uL    RBC 4.44 4.23 - 5.6 M/uL    Hemoglobin 13.9 13.6 - 17.2 g/dL    Hematocrit 43.3 41.1 - 50.3 %    MCV 97.5 82 - 102 FL    MCH 31.3 26.1 - 32.9 PG    MCHC 32.1 31.4 - 35.0 g/dL    RDW 13.7 11.9 - 14.6 %    Platelets 339 (L) 930 - 450 K/uL    MPV 10.9 9.4 - 12.3 FL    nRBC 0.00 0.0 - 0.2 K/uL    Differential Type AUTOMATED      Seg Neutrophils 75 43 - 78 %    Lymphocytes 13 13 - 44 %    Monocytes 8 4.0 - 12.0 %    Eosinophils % 2 0.5 - 7.8 %    Basophils 0 0.0 - 2.0 %    Immature Granulocytes 2 0.0 - 5.0 %    Segs Absolute 8.6 (H) 1.7 - 8.2 K/UL    Absolute Lymph # 1.5 0.5 - 4.6 K/UL    Absolute Mono # 0.9 0.1 - 1.3 K/UL    Absolute Eos # 0.2 0.0 - 0.8 K/UL    Basophils Absolute 0.0 0.0 - 0.2 K/UL    Absolute Immature Granulocyte 0.2 0.0 - 0.5 K/UL   Basic Metabolic Panel w/ Reflex to MG    Collection Time: 03/11/23  4:04 AM   Result Value Ref Range    Sodium 137 133 - 143 mmol/L    Potassium 4.0 3.5 - 5.1 mmol/L    Chloride 109 101 - 110 mmol/L    CO2 27 21 - 32 mmol/L    Anion Gap 1 (L) 2 - 11 mmol/L    Glucose 99 65 - 100 mg/dL    BUN 13 8 - 23 MG/DL    Creatinine 0.90 0.8 - 1.5 MG/DL    Est, Glom Filt Rate >60 >60 ml/min/1.73m2    Calcium 8.8 8.3 - 10.4 MG/DL   Respiratory Panel, Molecular, with COVID-19 (Restricted: peds pts or suitable admitted adults)    Collection Time: 03/11/23  6:27 AM    Specimen: Nasopharyngeal   Result Value Ref Range    Adenovirus by PCR NOT DETECTED NOTDET      Coronavirus 229E by PCR NOT DETECTED NOTDET      Coronavirus HKU1 by PCR NOT DETECTED NOTDET      Coronavirus NL63 by PCR NOT DETECTED NOTDET      Coronavirus OC43 by PCR NOT DETECTED NOTDET      SARS-CoV-2, PCR NOT DETECTED NOTDET      Human Metapneumovirus by PCR NOT DETECTED NOTDET Rhinovirus Enterovirus PCR NOT DETECTED NOTDET      Influenza A by PCR NOT DETECTED NOTDET      Influenza B PCR NOT DETECTED NOTDET      Parainfluenza 1 PCR NOT DETECTED NOTDET      Parainfluenza 2 PCR NOT DETECTED NOTDET      Parainfluenza 3 PCR NOT DETECTED NOTDET      Parainfluenza 4 PCR NOT DETECTED NOTDET      Respiratory Syncytial Virus by PCR NOT DETECTED NOTDET      Bordetella parapertussis by PCR NOT DETECTED NOTDET      Bordetella pertussis by PCR NOT DETECTED NOTDET      Chlamydophila Pneumonia PCR NOT DETECTED NOTDET      Mycoplasma pneumo by PCR NOT DETECTED NOTDET         Allergies   Allergen Reactions    Methocarbamol Hives    Ciprofloxacin Nausea And Vomiting     Immunization History   Administered Date(s) Administered    COVID-19, MODERNA Bivalent BOOSTER, (age 12y+), IM, 50 mcg/0.5 mL 08/23/2021, 09/20/2021    Influenza Virus Vaccine 11/01/2018, 09/10/2019    Influenza, FLUAD, (age 72 y+), Adjuvanted, 0.5mL 11/16/2022    Influenza, FLUARIX, FLULAVAL, FLUZONE (age 10 mo+) AND AFLURIA, (age 1 y+), PF, 0.5mL 11/01/2018, 09/10/2019       Recent Vital Data:  Patient Vitals for the past 24 hrs:   Temp Pulse Resp BP SpO2   03/11/23 1150 97.9 °F (36.6 °C) 87 19 120/87 95 %   03/11/23 0732 -- 91 19 -- 95 %   03/11/23 0721 98.6 °F (37 °C) 88 19 139/83 93 %   03/11/23 0339 98.2 °F (36.8 °C) 94 20 (!) 142/80 95 %   03/10/23 2242 99.1 °F (37.3 °C) (!) 106 22 (!) 147/84 91 %   03/10/23 1945 97.9 °F (36.6 °C) 94 24 135/80 94 %   03/10/23 1928 -- 81 18 -- 95 %   03/10/23 1557 97.9 °F (36.6 °C) 80 20 133/83 97 %   03/10/23 1405 -- 80 (!) 40 -- (!) 89 %       Oxygen Therapy  SpO2: 95 %  Pulse Oximetry Type: Continuous  Pulse via Oximetry: 80 beats per minute  Pulse Oximeter Device Mode: Intermittent  Pulse Oximeter Device Location: Left, Finger  O2 Device: Nasal cannula  Oximetry Probe Site Changed: Yes  Skin Assessment: Clean, dry, & intact  Skin Protection for O2 Device: N/A  FiO2 : 35 %  O2 Flow Rate (L/min): 2 L/min    Estimated body mass index is 36.02 kg/m² as calculated from the following:    Height as of this encounter: 5' 7\" (1.702 m). Weight as of this encounter: 230 lb (104.3 kg). Intake/Output Summary (Last 24 hours) at 3/11/2023 1239  Last data filed at 3/11/2023 0630  Gross per 24 hour   Intake 540 ml   Output 2105 ml   Net -1565 ml         Physical Exam:    General:    Well nourished. No overt distress  Head:  Normocephalic, atraumatic  Eyes:  Sclerae appear normal.  Pupils equally round. HENT:  Nares appear normal, no drainage. Moist mucous membranes  Neck:  No restricted ROM. Trachea midline  CV:   RRR. No m/r/g. No JVD  Lungs:   CTAB. No wheezing. Respirations even, unlabored  Abdomen:   Soft, nontender, nondistended. Extremities: Warm and dry. No cyanosis or clubbing. No edema. Skin:     No rashes. Normal coloration  Neuro:  CN II-XII grossly intact. Psych:  Normal mood and affect. Signed:  Ruth Aponte MD    Part of this note may have been written by using a voice dictation software. The note has been proof read but may still contain some grammatical/other typographical errors.

## 2023-03-12 LAB
BACTERIA SPEC CULT: NORMAL
BACTERIA SPEC CULT: NORMAL
SERVICE CMNT-IMP: NORMAL
SERVICE CMNT-IMP: NORMAL

## 2023-03-15 ENCOUNTER — NURSE ONLY (OUTPATIENT)
Dept: UROLOGY | Age: 68
End: 2023-03-15
Payer: MEDICARE

## 2023-03-15 DIAGNOSIS — E29.1 HYPOGONADISM IN MALE: Primary | ICD-10-CM

## 2023-03-15 PROCEDURE — 96372 THER/PROPH/DIAG INJ SC/IM: CPT | Performed by: UROLOGY

## 2023-03-15 RX ORDER — TESTOSTERONE CYPIONATE 200 MG/ML
100 INJECTION INTRAMUSCULAR ONCE
Status: COMPLETED | OUTPATIENT
Start: 2023-03-15 | End: 2023-03-15

## 2023-03-15 RX ADMIN — TESTOSTERONE CYPIONATE 100 MG: 200 INJECTION INTRAMUSCULAR at 16:58

## 2023-03-15 NOTE — PROGRESS NOTES
Per 's lab result note from 10/31/2022, patient due to receive 100mg testosterone injection every 3 weeks. Patient received injection IM LUOQ today without incident.    Testosterone injections authorized by Katie Sanchez stating: NO EBENEZER JORDANQ

## 2023-03-21 ENCOUNTER — OFFICE VISIT (OUTPATIENT)
Dept: FAMILY MEDICINE CLINIC | Facility: CLINIC | Age: 68
End: 2023-03-21
Payer: MEDICARE

## 2023-03-21 ENCOUNTER — TELEPHONE (OUTPATIENT)
Dept: PULMONOLOGY | Age: 68
End: 2023-03-21

## 2023-03-21 VITALS
SYSTOLIC BLOOD PRESSURE: 130 MMHG | HEIGHT: 67 IN | HEART RATE: 105 BPM | DIASTOLIC BLOOD PRESSURE: 86 MMHG | BODY MASS INDEX: 34.4 KG/M2 | WEIGHT: 219.2 LBS | OXYGEN SATURATION: 95 %

## 2023-03-21 DIAGNOSIS — I10 PRIMARY HYPERTENSION: ICD-10-CM

## 2023-03-21 DIAGNOSIS — I25.10 CORONARY ARTERY DISEASE INVOLVING NATIVE CORONARY ARTERY OF NATIVE HEART WITHOUT ANGINA PECTORIS: ICD-10-CM

## 2023-03-21 DIAGNOSIS — E29.1 MALE HYPOGONADISM: ICD-10-CM

## 2023-03-21 DIAGNOSIS — R06.83 SNORING: Primary | ICD-10-CM

## 2023-03-21 DIAGNOSIS — I26.99 BILATERAL PULMONARY EMBOLISM (HCC): Primary | ICD-10-CM

## 2023-03-21 DIAGNOSIS — Z09 HOSPITAL DISCHARGE FOLLOW-UP: ICD-10-CM

## 2023-03-21 PROCEDURE — 3017F COLORECTAL CA SCREEN DOC REV: CPT | Performed by: FAMILY MEDICINE

## 2023-03-21 PROCEDURE — 1111F DSCHRG MED/CURRENT MED MERGE: CPT | Performed by: FAMILY MEDICINE

## 2023-03-21 PROCEDURE — 3075F SYST BP GE 130 - 139MM HG: CPT | Performed by: FAMILY MEDICINE

## 2023-03-21 PROCEDURE — G8417 CALC BMI ABV UP PARAM F/U: HCPCS | Performed by: FAMILY MEDICINE

## 2023-03-21 PROCEDURE — G8427 DOCREV CUR MEDS BY ELIG CLIN: HCPCS | Performed by: FAMILY MEDICINE

## 2023-03-21 PROCEDURE — 1123F ACP DISCUSS/DSCN MKR DOCD: CPT | Performed by: FAMILY MEDICINE

## 2023-03-21 PROCEDURE — 1036F TOBACCO NON-USER: CPT | Performed by: FAMILY MEDICINE

## 2023-03-21 PROCEDURE — G8484 FLU IMMUNIZE NO ADMIN: HCPCS | Performed by: FAMILY MEDICINE

## 2023-03-21 PROCEDURE — 99213 OFFICE O/P EST LOW 20 MIN: CPT | Performed by: FAMILY MEDICINE

## 2023-03-21 PROCEDURE — 3079F DIAST BP 80-89 MM HG: CPT | Performed by: FAMILY MEDICINE

## 2023-03-21 SDOH — ECONOMIC STABILITY: HOUSING INSECURITY
IN THE LAST 12 MONTHS, WAS THERE A TIME WHEN YOU DID NOT HAVE A STEADY PLACE TO SLEEP OR SLEPT IN A SHELTER (INCLUDING NOW)?: NO

## 2023-03-21 SDOH — ECONOMIC STABILITY: FOOD INSECURITY: WITHIN THE PAST 12 MONTHS, THE FOOD YOU BOUGHT JUST DIDN'T LAST AND YOU DIDN'T HAVE MONEY TO GET MORE.: NEVER TRUE

## 2023-03-21 SDOH — ECONOMIC STABILITY: FOOD INSECURITY: WITHIN THE PAST 12 MONTHS, YOU WORRIED THAT YOUR FOOD WOULD RUN OUT BEFORE YOU GOT MONEY TO BUY MORE.: NEVER TRUE

## 2023-03-21 SDOH — ECONOMIC STABILITY: INCOME INSECURITY: HOW HARD IS IT FOR YOU TO PAY FOR THE VERY BASICS LIKE FOOD, HOUSING, MEDICAL CARE, AND HEATING?: NOT HARD AT ALL

## 2023-03-21 ASSESSMENT — PATIENT HEALTH QUESTIONNAIRE - PHQ9
SUM OF ALL RESPONSES TO PHQ9 QUESTIONS 1 & 2: 0
SUM OF ALL RESPONSES TO PHQ QUESTIONS 1-9: 0
SUM OF ALL RESPONSES TO PHQ QUESTIONS 1-9: 0
1. LITTLE INTEREST OR PLEASURE IN DOING THINGS: 0
SUM OF ALL RESPONSES TO PHQ QUESTIONS 1-9: 0
SUM OF ALL RESPONSES TO PHQ QUESTIONS 1-9: 0
2. FEELING DOWN, DEPRESSED OR HOPELESS: 0

## 2023-03-21 NOTE — PROGRESS NOTES
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 He was recently discharged from the hospital 3/11/23 for bilateral pulmonary embolism with an extensive right DVT. Interventional Radiology preformed lysis of the PE on 3/9 and a thrombectomy of the RLE - DVT on 3/10. He is currently on oral anticoagulation elliquis 5 mg bid. He had the same issues back in November 2022 with a PE and right DVT. He admits to still feeling weak, tired, fatigued, and muscle cramps in right calf, but overall improved since admission to hospital. He is SOB if he walks a longer distance and has LUE weakness due to a rotator cuff issue. He denies any tingling or numbness. He reports he needs a left knee replacement but ortho will not clear him due to being on blood thinners. He did not bring paperwork, but believes he needs to have follow up appointments with cardiology and pulmonology. He sees [facility] cardiology and believes he has an upcoming appointment. He is wearing a compression stalking on the right leg and uses a heel lift in the left shoe. He has a history of PAD, hyperlipidemia, HTN. He was started on Coreg in the hospital and advised to start a baby aspirin daily as well. He was strongly encouraged to stop his testosterone injections. He has been receiving testosterone injections for hypogonadism. He reports he did receive an injection on 3/15 after being out of the hospital. He states he will discontinue them from here on out. He reports the clots may be due to the COVID shots.        Social: former tobacco user (7-8 cigars / day) quit in November 2022; 5 beers/week      ROS:  Gen: Denies: Fever, HA, fatigue, cough, sore throat  Positive: fatigue  MSK: denies joint pains/swellings  Positive: right leg pains; left knee pain   HEENT: denies swallowing/vision/hearing difficulties   CVD: denies chest pain and palpitations   Pulm: denies  wheeze  Positive: SOB on exertion  Heme: positive: bruising
Coronavirus NL63 by PCR NOT DETECTED NOTDET      Coronavirus OC43 by PCR NOT DETECTED NOTDET      SARS-CoV-2, PCR NOT DETECTED NOTDET      Human Metapneumovirus by PCR NOT DETECTED NOTDET      Rhinovirus Enterovirus PCR NOT DETECTED NOTDET      Influenza A by PCR NOT DETECTED NOTDET      Influenza B PCR NOT DETECTED NOTDET      Parainfluenza 1 PCR NOT DETECTED NOTDET      Parainfluenza 2 PCR NOT DETECTED NOTDET      Parainfluenza 3 PCR NOT DETECTED NOTDET      Parainfluenza 4 PCR NOT DETECTED NOTDET      Respiratory Syncytial Virus by PCR NOT DETECTED NOTDET      Bordetella parapertussis by PCR NOT DETECTED NOTDET      Bordetella pertussis by PCR NOT DETECTED NOTDET      Chlamydophila Pneumonia PCR NOT DETECTED NOTDET      Mycoplasma pneumo by PCR NOT DETECTED NOTDET         No results found for any visits on 03/21/23. ASSESSMENT and PLAN    1. Bilateral pulmonary embolism ([address] stable on anticoagulation  2. Primary hypertension, at goal  3. Coronary artery disease involving native coronary artery of native heart without angina pectoris, stable without angina  4. Male hypogonadism, treated with injectable testosterone. -   Discussed risk of erythrocytosis, and consequential hypercoagulability with testosterone replacement therapy. I also related my experience and that I see is worse with injectable testosterone treatment as opposed to topical gel. I have advised that he see his urologist and discuss either stopping or trial of topical.    Return in about 3 months (around 6/21/2023).        Jocelyn Mckinnon MD

## 2023-03-21 NOTE — TELEPHONE ENCOUNTER
Referring provider for Hospital put in order for sleep study under palliative care services. Please review and see if you can help clarify these orders. Will try to schedule for CPFT as note from Dr. Katheryn Thapa stated.

## 2023-03-22 DIAGNOSIS — R06.02 SOB (SHORTNESS OF BREATH): Primary | ICD-10-CM

## 2023-03-31 ENCOUNTER — HOSPITAL ENCOUNTER (OUTPATIENT)
Dept: PULMONOLOGY | Age: 68
End: 2023-03-31
Payer: MEDICARE

## 2023-03-31 DIAGNOSIS — R06.02 SOB (SHORTNESS OF BREATH): ICD-10-CM

## 2023-03-31 PROCEDURE — 94729 DIFFUSING CAPACITY: CPT

## 2023-03-31 PROCEDURE — 94060 EVALUATION OF WHEEZING: CPT

## 2023-03-31 PROCEDURE — 94726 PLETHYSMOGRAPHY LUNG VOLUMES: CPT

## 2023-04-03 ENCOUNTER — OFFICE VISIT (OUTPATIENT)
Dept: ORTHOPEDIC SURGERY | Age: 68
End: 2023-04-03

## 2023-04-03 DIAGNOSIS — M54.50 LUMBAR BACK PAIN: Primary | ICD-10-CM

## 2023-04-03 DIAGNOSIS — M47.816 SPONDYLOSIS OF LUMBAR REGION WITHOUT MYELOPATHY OR RADICULOPATHY: ICD-10-CM

## 2023-04-03 DIAGNOSIS — M48.061 SPINAL STENOSIS OF LUMBAR REGION WITHOUT NEUROGENIC CLAUDICATION: ICD-10-CM

## 2023-04-03 DIAGNOSIS — M54.16 LUMBAR RADICULOPATHY: ICD-10-CM

## 2023-04-03 RX ORDER — TRIAMCINOLONE ACETONIDE 40 MG/ML
160 INJECTION, SUSPENSION INTRA-ARTICULAR; INTRAMUSCULAR ONCE
Status: COMPLETED | OUTPATIENT
Start: 2023-04-03 | End: 2023-04-03

## 2023-04-03 RX ADMIN — TRIAMCINOLONE ACETONIDE 160 MG: 40 INJECTION, SUSPENSION INTRA-ARTICULAR; INTRAMUSCULAR at 16:03

## 2023-04-03 NOTE — PROGRESS NOTES
pain does not have a lot of radicular symptoms right now so I do not think it is to the point I would recommend selective nerve root block. Reviewed lumbar spine exercises. Stressed his need to be compliant with his anticoagulant therapy. I can perform bilateral lumbar trigger point injections today and I will see him back in January of next year for continuity of care. Can reevaluate prior to that as need be. I do not think we need to reimage right now. He has too many other issues going on and we can reconsider that if his gait deteriorates further. Trigger Point Injection(s):  After informed oral consent, patient was prepped per routine. The bilateral lumbar paraspinal area(s) were injected. 80 mg of Kenalog with 1 cc of 0.25% Marcaine injected at each site. Patient tolerated well. Band aid(s) applied. A total of two muscles/sites injected today for trigger point charge.     Vesna Swanson MD

## 2023-04-05 ENCOUNTER — NURSE ONLY (OUTPATIENT)
Dept: UROLOGY | Age: 68
End: 2023-04-05
Payer: MEDICARE

## 2023-04-05 DIAGNOSIS — E29.1 HYPOGONADISM IN MALE: Primary | ICD-10-CM

## 2023-04-05 PROCEDURE — 96372 THER/PROPH/DIAG INJ SC/IM: CPT | Performed by: UROLOGY

## 2023-04-05 RX ORDER — TESTOSTERONE CYPIONATE 200 MG/ML
100 INJECTION, SOLUTION INTRAMUSCULAR ONCE
Status: COMPLETED | OUTPATIENT
Start: 2023-04-05 | End: 2023-04-05

## 2023-04-05 RX ADMIN — TESTOSTERONE CYPIONATE 100 MG: 200 INJECTION, SOLUTION INTRAMUSCULAR at 15:02

## 2023-04-05 NOTE — PROGRESS NOTES
Per 's lab result note from 10/31/2022, patient due to receive 100mg testosterone injection every 3 weeks. Patient received injection IM RUOQ today without incident. Testosterone injections authorized by insurance, Orlando Gunn reports no PA required.

## 2023-04-06 ENCOUNTER — TELEPHONE (OUTPATIENT)
Dept: PULMONOLOGY | Age: 68
End: 2023-04-06

## 2023-04-18 ENCOUNTER — HOSPITAL ENCOUNTER (OUTPATIENT)
Dept: SLEEP CENTER | Age: 68
Discharge: HOME | End: 2023-04-21

## 2023-04-19 ENCOUNTER — OFFICE VISIT (OUTPATIENT)
Dept: PULMONOLOGY | Age: 68
End: 2023-04-19
Payer: MEDICARE

## 2023-04-19 VITALS
WEIGHT: 218 LBS | TEMPERATURE: 97 F | DIASTOLIC BLOOD PRESSURE: 92 MMHG | SYSTOLIC BLOOD PRESSURE: 144 MMHG | OXYGEN SATURATION: 98 % | HEART RATE: 103 BPM | BODY MASS INDEX: 34.14 KG/M2

## 2023-04-19 DIAGNOSIS — E66.9 OBESITY (BMI 30.0-34.9): ICD-10-CM

## 2023-04-19 DIAGNOSIS — I26.99 BILATERAL PULMONARY EMBOLISM (HCC): Primary | ICD-10-CM

## 2023-04-19 DIAGNOSIS — G47.30 SLEEP APNEA, UNSPECIFIED TYPE: ICD-10-CM

## 2023-04-19 DIAGNOSIS — F17.211 CIGARETTE NICOTINE DEPENDENCE IN REMISSION: ICD-10-CM

## 2023-04-19 DIAGNOSIS — R53.83 OTHER FATIGUE: ICD-10-CM

## 2023-04-19 DIAGNOSIS — J45.20 MILD INTERMITTENT ASTHMA WITHOUT COMPLICATION: ICD-10-CM

## 2023-04-19 DIAGNOSIS — Z09 HOSPITAL DISCHARGE FOLLOW-UP: ICD-10-CM

## 2023-04-19 LAB
EXPIRATORY TIME: NORMAL
FEF 25-75% %PRED-PRE: NORMAL
FEF 25-75% PRED: NORMAL
FEF 25-75%-PRE: NORMAL
FEV1 %PRED-PRE: 79 %
FEV1 PRED: NORMAL
FEV1/FVC %PRED-PRE: NORMAL
FEV1/FVC PRED: NORMAL
FEV1/FVC: 80 %
FEV1: 2.34 L
FVC %PRED-PRE: 73 %
FVC PRED: NORMAL
FVC: 2.94 L
PEF %PRED-PRE: NORMAL
PEF PRED: NORMAL
PEF-PRE: NORMAL

## 2023-04-19 PROCEDURE — 99214 OFFICE O/P EST MOD 30 MIN: CPT | Performed by: NURSE PRACTITIONER

## 2023-04-19 PROCEDURE — 3078F DIAST BP <80 MM HG: CPT | Performed by: NURSE PRACTITIONER

## 2023-04-19 PROCEDURE — 3017F COLORECTAL CA SCREEN DOC REV: CPT | Performed by: NURSE PRACTITIONER

## 2023-04-19 PROCEDURE — 1036F TOBACCO NON-USER: CPT | Performed by: NURSE PRACTITIONER

## 2023-04-19 PROCEDURE — G8427 DOCREV CUR MEDS BY ELIG CLIN: HCPCS | Performed by: NURSE PRACTITIONER

## 2023-04-19 PROCEDURE — G8417 CALC BMI ABV UP PARAM F/U: HCPCS | Performed by: NURSE PRACTITIONER

## 2023-04-19 PROCEDURE — 3074F SYST BP LT 130 MM HG: CPT | Performed by: NURSE PRACTITIONER

## 2023-04-19 PROCEDURE — 1123F ACP DISCUSS/DSCN MKR DOCD: CPT | Performed by: NURSE PRACTITIONER

## 2023-04-19 RX ORDER — MULTIVITAMIN/IRON/FOLIC ACID 18MG-0.4MG
TABLET ORAL
COMMUNITY

## 2023-04-19 RX ORDER — ASPIRIN 81 MG/1
81 TABLET ORAL DAILY
COMMUNITY

## 2023-04-19 RX ORDER — ALBUTEROL SULFATE 90 UG/1
AEROSOL, METERED RESPIRATORY (INHALATION)
Qty: 1 EACH | Refills: 11 | Status: SHIPPED | OUTPATIENT
Start: 2023-04-19

## 2023-04-19 RX ORDER — DIAZEPAM 5 MG/1
5 TABLET ORAL DAILY
COMMUNITY
Start: 2023-01-18

## 2023-04-19 ASSESSMENT — ENCOUNTER SYMPTOMS
HEMOPTYSIS: 0
WHEEZING: 0
COUGH: 1
SHORTNESS OF BREATH: 1

## 2023-04-19 ASSESSMENT — PULMONARY FUNCTION TESTS
FEV1: 2.34
FVC: 2.94
FEV1_PERCENT_PREDICTED_PRE: 79
FVC_PERCENT_PREDICTED_PRE: 73
FEV1/FVC: 80

## 2023-04-19 NOTE — PATIENT INSTRUCTIONS
Continue eliquis 5mg twice daily. Appointment with hematology 4/24/2023 as scheduled. Hematology evaluation and recommendations for duration of anticoagulant. He remains off of testosterone. Albuterol 2 puffs 4 times daily if needed for shortness of breath or wheezing. He will be contacted by the sleep center in regards to split-night sleep study but sounds as if he will need CPAP therapy. I discussed this process with him, encouraged to contact the sleep center for results if he has not heard back from them in 1 to 2 weeks. He is commended in smoking cessation. Follow-up here in 3 months with Dr. Alejandro Perez. I discussed symptoms for which she should contact us sooner.

## 2023-04-19 NOTE — PROGRESS NOTES
He is a 68-year-old male seen today in hospital follow-up after admission 3/2023, history of PE 11/2022, intermittently on Eliquis, presented with shortness of breath and noted to have bilateral PE with hypoxemia, underwent PE lysis with bilateral infusion catheters. Noted that he also takes testosterone injections. He reported noncompliance with Eliquis secondary to cost.  There is also reported history of asthma, history of tobacco use. He was maintained on Xopenex/Atrovent, Pulmicort via nebulizer. DIAGNOSTICS:    Chest CT 11/7/2022-acute large bilateral PE.  IR thrombolysis. LE US 11/8/2022-occlusive thrombus in mid to distal femoral vein, popliteal vein and posterior tibial and peroneal veins in the RLE; no evidence of DVT in the LLE. Echo 3/9/2023-EF 45-50%, abnormal diastolic function, mild MR, RVSP 35 mmHg. Chest CT 3/8/2023-acute appearing occlusive bilateral pulmonary emboli with large clot burden and findings suggestive of right heart strain. IR thrombolysis. LE US 3/9/2023-extensive DVT RLE. CPFT's 3/31/2023-spirometry and flow volume loop are normal without airflow obstruction. There is significant improvement in FEV1 following bronchodilator.   Lung volumes are mildly reduced suggesting restriction but diffusion capacity is normal.
anticoagulant. He remains off of testosterone. Albuterol 2 puffs 4 times daily if needed for shortness of breath or wheezing. Can consider adding maintenance MDI later for increased symptoms or increased need for albuterol. He will be contacted by the sleep center in regards to split-night sleep study but sounds as if he will need CPAP therapy. I discussed this process with him, encouraged to contact the sleep center for results if he has not heard back from them in 1 to 2 weeks. He is commended in smoking cessation. Will discuss LDCT later, would be due 3/2024. Follow-up here in 3 months with Dr. Debbie Chaney. I discussed symptoms for which she should contact us sooner. Orders Placed This Encounter   Procedures    Spirometry Without Bronchodilator       Orders Placed This Encounter   Medications    albuterol sulfate HFA (PROVENTIL;VENTOLIN;PROAIR) 108 (90 Base) MCG/ACT inhaler     Si puffs 4 times daily if needed for shortness of breath or wheezing. Patient will call when refills are needed     Dispense:  1 each     Refill:  11       Follow-up and Dispositions    Return in about 3 months (around 2023) for Dr Debbie Chaney, f/u PE, ALYSSA, mild asthma/COPD. Peggy Thornton APRN - CNP    Total  time spent with patient - 39 min. Collaborating MD: Dr. Damien Bruce:    He is a 58-year-old male, history of PE 2022, intermittently on Eliquis,  seen today in hospital follow-up after admission 3/2023, presented with shortness of breath and noted to have bilateral PE with hypoxemia, underwent PE lysis with bilateral infusion catheters. Noted that he previously took testosterone injections but these were discontinued after his initial PE. He reported noncompliance with Eliquis secondary to cost.  There is also reported history of asthma, history of tobacco use. He was maintained on Xopenex/Atrovent, Pulmicort via nebulizer.     Today, he reports interval decrease in

## 2023-04-20 DIAGNOSIS — I82.491 ACUTE DEEP VEIN THROMBOSIS (DVT) OF OTHER SPECIFIED VEIN OF RIGHT LOWER EXTREMITY (HCC): ICD-10-CM

## 2023-04-20 DIAGNOSIS — I26.99 ACUTE PULMONARY EMBOLISM, UNSPECIFIED PULMONARY EMBOLISM TYPE, UNSPECIFIED WHETHER ACUTE COR PULMONALE PRESENT (HCC): Primary | ICD-10-CM

## 2023-04-24 ENCOUNTER — HOSPITAL ENCOUNTER (OUTPATIENT)
Dept: LAB | Age: 68
Discharge: HOME | End: 2023-04-27
Payer: MEDICARE

## 2023-04-24 ENCOUNTER — OFFICE VISIT (OUTPATIENT)
Dept: ONCOLOGY | Age: 68
End: 2023-04-24
Payer: MEDICARE

## 2023-04-24 VITALS
DIASTOLIC BLOOD PRESSURE: 98 MMHG | WEIGHT: 217.5 LBS | BODY MASS INDEX: 34.14 KG/M2 | RESPIRATION RATE: 23 BRPM | OXYGEN SATURATION: 97 % | SYSTOLIC BLOOD PRESSURE: 169 MMHG | HEART RATE: 109 BPM | TEMPERATURE: 98.5 F | HEIGHT: 67 IN

## 2023-04-24 DIAGNOSIS — I26.99 ACUTE PULMONARY EMBOLISM, UNSPECIFIED PULMONARY EMBOLISM TYPE, UNSPECIFIED WHETHER ACUTE COR PULMONALE PRESENT (HCC): ICD-10-CM

## 2023-04-24 DIAGNOSIS — I26.99 RECURRENT PULMONARY EMBOLISM (HCC): Primary | ICD-10-CM

## 2023-04-24 DIAGNOSIS — I10 UNCONTROLLED HYPERTENSION: ICD-10-CM

## 2023-04-24 DIAGNOSIS — I82.491 ACUTE DEEP VEIN THROMBOSIS (DVT) OF OTHER SPECIFIED VEIN OF RIGHT LOWER EXTREMITY (HCC): ICD-10-CM

## 2023-04-24 LAB
ALBUMIN SERPL-MCNC: 3.4 G/DL (ref 3.2–4.6)
ALBUMIN/GLOB SERPL: 0.9 (ref 0.4–1.6)
ALP SERPL-CCNC: 60 U/L (ref 50–136)
ALT SERPL-CCNC: 31 U/L (ref 12–65)
ANION GAP SERPL CALC-SCNC: 6 MMOL/L (ref 2–11)
AST SERPL-CCNC: 20 U/L (ref 15–37)
BASOPHILS # BLD: 0 K/UL (ref 0–0.2)
BASOPHILS NFR BLD: 0 % (ref 0–2)
BILIRUB SERPL-MCNC: 0.5 MG/DL (ref 0.2–1.1)
BUN SERPL-MCNC: 26 MG/DL (ref 8–23)
CALCIUM SERPL-MCNC: 10.4 MG/DL (ref 8.3–10.4)
CHLORIDE SERPL-SCNC: 111 MMOL/L (ref 101–110)
CO2 SERPL-SCNC: 25 MMOL/L (ref 21–32)
CREAT SERPL-MCNC: 1.1 MG/DL (ref 0.8–1.5)
DIFFERENTIAL METHOD BLD: ABNORMAL
EOSINOPHIL # BLD: 0.1 K/UL (ref 0–0.8)
EOSINOPHIL NFR BLD: 1 % (ref 0.5–7.8)
ERYTHROCYTE [DISTWIDTH] IN BLOOD BY AUTOMATED COUNT: 14.3 % (ref 11.9–14.6)
GLOBULIN SER CALC-MCNC: 4 G/DL (ref 2.8–4.5)
GLUCOSE SERPL-MCNC: 96 MG/DL (ref 65–100)
HCT VFR BLD AUTO: 50.7 % (ref 41.1–50.3)
HGB BLD-MCNC: 16.2 G/DL (ref 13.6–17.2)
IMM GRANULOCYTES # BLD AUTO: 0.1 K/UL (ref 0–0.5)
IMM GRANULOCYTES NFR BLD AUTO: 1 % (ref 0–5)
LYMPHOCYTES # BLD: 1.1 K/UL (ref 0.5–4.6)
LYMPHOCYTES NFR BLD: 9 % (ref 13–44)
MCH RBC QN AUTO: 30.3 PG (ref 26.1–32.9)
MCHC RBC AUTO-ENTMCNC: 32 G/DL (ref 31.4–35)
MCV RBC AUTO: 94.8 FL (ref 82–102)
MONOCYTES # BLD: 0.8 K/UL (ref 0.1–1.3)
MONOCYTES NFR BLD: 7 % (ref 4–12)
NEUTS SEG # BLD: 9.8 K/UL (ref 1.7–8.2)
NEUTS SEG NFR BLD: 81 % (ref 43–78)
NRBC # BLD: 0 K/UL (ref 0–0.2)
PLATELET # BLD AUTO: 176 K/UL (ref 150–450)
PMV BLD AUTO: 9.6 FL (ref 9.4–12.3)
POTASSIUM SERPL-SCNC: 4.2 MMOL/L (ref 3.5–5.1)
PROT SERPL-MCNC: 7.4 G/DL (ref 6.3–8.2)
RBC # BLD AUTO: 5.35 M/UL (ref 4.23–5.6)
SODIUM SERPL-SCNC: 142 MMOL/L (ref 133–143)
WBC # BLD AUTO: 12 K/UL (ref 4.3–11.1)

## 2023-04-24 PROCEDURE — G8427 DOCREV CUR MEDS BY ELIG CLIN: HCPCS | Performed by: INTERNAL MEDICINE

## 2023-04-24 PROCEDURE — 99214 OFFICE O/P EST MOD 30 MIN: CPT | Performed by: INTERNAL MEDICINE

## 2023-04-24 PROCEDURE — G8417 CALC BMI ABV UP PARAM F/U: HCPCS | Performed by: INTERNAL MEDICINE

## 2023-04-24 PROCEDURE — 3017F COLORECTAL CA SCREEN DOC REV: CPT | Performed by: INTERNAL MEDICINE

## 2023-04-24 PROCEDURE — 85025 COMPLETE CBC W/AUTO DIFF WBC: CPT

## 2023-04-24 PROCEDURE — 3080F DIAST BP >= 90 MM HG: CPT | Performed by: INTERNAL MEDICINE

## 2023-04-24 PROCEDURE — 80053 COMPREHEN METABOLIC PANEL: CPT

## 2023-04-24 PROCEDURE — 36415 COLL VENOUS BLD VENIPUNCTURE: CPT

## 2023-04-24 PROCEDURE — 1036F TOBACCO NON-USER: CPT | Performed by: INTERNAL MEDICINE

## 2023-04-24 PROCEDURE — 3077F SYST BP >= 140 MM HG: CPT | Performed by: INTERNAL MEDICINE

## 2023-04-24 PROCEDURE — 1123F ACP DISCUSS/DSCN MKR DOCD: CPT | Performed by: INTERNAL MEDICINE

## 2023-04-24 RX ORDER — LISINOPRIL 20 MG/1
20 TABLET ORAL DAILY
Qty: 30 TABLET | Refills: 0 | Status: SHIPPED | OUTPATIENT
Start: 2023-04-24 | End: 2023-05-24

## 2023-04-24 ASSESSMENT — PATIENT HEALTH QUESTIONNAIRE - PHQ9
SUM OF ALL RESPONSES TO PHQ QUESTIONS 1-9: 0
1. LITTLE INTEREST OR PLEASURE IN DOING THINGS: 0
SUM OF ALL RESPONSES TO PHQ QUESTIONS 1-9: 0
SUM OF ALL RESPONSES TO PHQ9 QUESTIONS 1 & 2: 0
SUM OF ALL RESPONSES TO PHQ QUESTIONS 1-9: 0
SUM OF ALL RESPONSES TO PHQ QUESTIONS 1-9: 0
2. FEELING DOWN, DEPRESSED OR HOPELESS: 0

## 2023-04-24 NOTE — PATIENT INSTRUCTIONS
Patient Instructions from Today's Visit    Reason for Visit:  Follow up     Plan:  -Stop testosterone and continue blood thinner  -Refill for Eliquis  -Continue to follow up with PCP, they can manage blood thinner if you prefer to avoid coming here as often.   -You should start checking your blood pressure at home in the morning and at night while at rest. The goal for the top number of your BP is 110-140. Keep a log and take it to your primary care doctor so he/she can adjust your medications accordingly.       Follow Up:  3 months    Recent Lab Results:  Hospital Outpatient Visit on 04/24/2023   Component Date Value Ref Range Status    WBC 04/24/2023 12.0 (H)  4.3 - 11.1 K/uL Final    RBC 04/24/2023 5.35  4.23 - 5.6 M/uL Final    Hemoglobin 04/24/2023 16.2  13.6 - 17.2 g/dL Final    Hematocrit 04/24/2023 50.7 (H)  41.1 - 50.3 % Final    MCV 04/24/2023 94.8  82.0 - 102.0 FL Final    MCH 04/24/2023 30.3  26.1 - 32.9 PG Final    MCHC 04/24/2023 32.0  31.4 - 35.0 g/dL Final    RDW 04/24/2023 14.3  11.9 - 14.6 % Final    Platelets 23/99/1433 176  150 - 450 K/uL Final    MPV 04/24/2023 9.6  9.4 - 12.3 FL Final    nRBC 04/24/2023 0.00  0.0 - 0.2 K/uL Final    **Note: Absolute NRBC parameter is now reported with Hemogram**    Differential Type 04/24/2023 AUTOMATED    Final    Seg Neutrophils 04/24/2023 81 (H)  43 - 78 % Final    Lymphocytes 04/24/2023 9 (L)  13 - 44 % Final    Monocytes 04/24/2023 7  4.0 - 12.0 % Final    Eosinophils % 04/24/2023 1  0.5 - 7.8 % Final    Basophils 04/24/2023 0  0.0 - 2.0 % Final    Immature Granulocytes 04/24/2023 1  0.0 - 5.0 % Final    Segs Absolute 04/24/2023 9.8 (H)  1.7 - 8.2 K/UL Final    Absolute Lymph # 04/24/2023 1.1  0.5 - 4.6 K/UL Final    Absolute Mono # 04/24/2023 0.8  0.1 - 1.3 K/UL Final    Absolute Eos # 04/24/2023 0.1  0.0 - 0.8 K/UL Final    Basophils Absolute 04/24/2023 0.0  0.0 - 0.2 K/UL Final    Absolute Immature Granulocyte 04/24/2023 0.1  0.0 - 0.5 K/UL Final

## 2023-04-24 NOTE — PROGRESS NOTES
with large bilateral PE. He was started on heparin drip and IR performed bilateral thrombectomy. He was transitioned to Eliquis upon discharge. He has continued this and is tolerating without issue. His severe dyspnea and chest pain has resolved. He continues testosterone replacement therapy with no plans to quit despite the risk but he has quit smoking. He would like to get his left knee replaced but ortho has told him he cannot do this on anticoagulation. He denies any bleeding but he does notice easier bruising. He was admitted on 3/8/2023 again for major occlusive bilateral PE due to noncompliance to anticoagulation, had IR thrombolysis on 3/9/2023. Medications:      Current Outpatient Medications on File Prior to Visit   Medication Sig Dispense Refill    diazePAM (VALIUM) 5 MG tablet Take 1 tablet by mouth daily. aspirin 81 MG EC tablet Take 1 tablet by mouth daily      Multiple Vitamins-Minerals (CENTRUM ADULTS) TABS Take by mouth      albuterol sulfate HFA (PROVENTIL;VENTOLIN;PROAIR) 108 (90 Base) MCG/ACT inhaler 2 puffs 4 times daily if needed for shortness of breath or wheezing. Patient will call when refills are needed 1 each 11    apixaban (ELIQUIS) 5 MG TABS tablet Take 1 tablet by mouth 2 times daily 60 tablet 2    atorvastatin (LIPITOR) 80 MG tablet TAKE 1 TABLET EVERY NIGHT      traMADol (ULTRAM) 50 MG tablet Take 1 tablet by mouth every 6 hours as needed. apixaban (ELIQUIS) 5 MG TABS tablet Take 2 tablets by mouth 2 times daily for 13 doses 26 tablet 0     No current facility-administered medications on file prior to visit. Allergies: Allergies   Allergen Reactions    Methocarbamol Hives    Ciprofloxacin Nausea And Vomiting           Review of Systems: The Review of Systems is documented in full in the internal medical record. All systems are negative other than for those noted above. Past Medical History:   Active Ambulatory Problems     Diagnosis Date

## 2023-04-25 ENCOUNTER — TELEPHONE (OUTPATIENT)
Dept: SLEEP MEDICINE | Age: 68
End: 2023-04-25

## 2023-04-25 DIAGNOSIS — G47.33 OSA (OBSTRUCTIVE SLEEP APNEA): Primary | ICD-10-CM

## 2023-04-25 NOTE — TELEPHONE ENCOUNTER
Pt called back and agreed to start pap therapy.  Order sent to [address] [place] [address]

## 2023-05-16 ENCOUNTER — TELEPHONE (OUTPATIENT)
Age: 68
End: 2023-05-16

## 2023-05-16 NOTE — TELEPHONE ENCOUNTER
----- Message from Nitin Ya sent at 5/16/2023  8:48 AM EDT -----    ----- Message -----  From: Promise Hunt DO  Sent: 5/15/2023   3:15 PM EDT  To: Yazmin Kurtz MA    Please call the patient regarding their echocardiogram result. Heart squeezing function is normal on echocardiogram, no severe valve abnormalities.

## 2023-06-16 PROBLEM — C61 MALIGNANT NEOPLASM OF PROSTATE (HCC): Status: ACTIVE | Noted: 2023-06-16

## 2023-06-16 PROBLEM — C61 MALIGNANT NEOPLASM OF PROSTATE (HCC): Status: RESOLVED | Noted: 2023-06-16 | Resolved: 2023-06-16

## 2023-06-26 ENCOUNTER — OFFICE VISIT (OUTPATIENT)
Age: 68
End: 2023-06-26
Payer: MEDICARE

## 2023-06-26 VITALS
WEIGHT: 210 LBS | HEART RATE: 96 BPM | HEIGHT: 67 IN | SYSTOLIC BLOOD PRESSURE: 126 MMHG | BODY MASS INDEX: 32.96 KG/M2 | DIASTOLIC BLOOD PRESSURE: 88 MMHG

## 2023-06-26 DIAGNOSIS — R93.1 AGATSTON CAC SCORE, >400: ICD-10-CM

## 2023-06-26 DIAGNOSIS — I51.89 RIGHT VENTRICULAR SYSTOLIC DYSFUNCTION: ICD-10-CM

## 2023-06-26 DIAGNOSIS — I10 ESSENTIAL HYPERTENSION: ICD-10-CM

## 2023-06-26 DIAGNOSIS — E78.2 MIXED HYPERLIPIDEMIA: ICD-10-CM

## 2023-06-26 DIAGNOSIS — Z86.718 HISTORY OF DVT IN ADULTHOOD: ICD-10-CM

## 2023-06-26 DIAGNOSIS — I26.94 MULTIPLE SUBSEGMENTAL PULMONARY EMBOLI WITHOUT ACUTE COR PULMONALE (HCC): ICD-10-CM

## 2023-06-26 DIAGNOSIS — I25.10 CORONARY ARTERY DISEASE INVOLVING NATIVE CORONARY ARTERY OF NATIVE HEART WITHOUT ANGINA PECTORIS: Primary | ICD-10-CM

## 2023-06-26 DIAGNOSIS — I42.9 CARDIOMYOPATHY, UNSPECIFIED TYPE (HCC): ICD-10-CM

## 2023-06-26 PROCEDURE — G8417 CALC BMI ABV UP PARAM F/U: HCPCS | Performed by: INTERNAL MEDICINE

## 2023-06-26 PROCEDURE — 1123F ACP DISCUSS/DSCN MKR DOCD: CPT | Performed by: INTERNAL MEDICINE

## 2023-06-26 PROCEDURE — 4004F PT TOBACCO SCREEN RCVD TLK: CPT | Performed by: INTERNAL MEDICINE

## 2023-06-26 PROCEDURE — 3079F DIAST BP 80-89 MM HG: CPT | Performed by: INTERNAL MEDICINE

## 2023-06-26 PROCEDURE — 3074F SYST BP LT 130 MM HG: CPT | Performed by: INTERNAL MEDICINE

## 2023-06-26 PROCEDURE — 3017F COLORECTAL CA SCREEN DOC REV: CPT | Performed by: INTERNAL MEDICINE

## 2023-06-26 PROCEDURE — G8428 CUR MEDS NOT DOCUMENT: HCPCS | Performed by: INTERNAL MEDICINE

## 2023-06-26 PROCEDURE — 99214 OFFICE O/P EST MOD 30 MIN: CPT | Performed by: INTERNAL MEDICINE

## 2023-06-26 ASSESSMENT — ENCOUNTER SYMPTOMS
RESPIRATORY NEGATIVE: 1
HEMATOCHEZIA: 0

## 2023-07-03 ENCOUNTER — TELEPHONE (OUTPATIENT)
Dept: ORTHOPEDIC SURGERY | Age: 68
End: 2023-07-03

## 2023-07-03 DIAGNOSIS — R73.09 ELEVATED GLUCOSE: Primary | ICD-10-CM

## 2023-07-05 DIAGNOSIS — E78.2 MIXED HYPERLIPIDEMIA: Primary | ICD-10-CM

## 2023-07-11 NOTE — TELEPHONE ENCOUNTER
Called and scheduled patient for repeat trigger point injections with [facility] 7/14 GR 10:45am

## 2023-07-14 ENCOUNTER — OFFICE VISIT (OUTPATIENT)
Dept: ORTHOPEDIC SURGERY | Age: 68
End: 2023-07-14

## 2023-07-14 DIAGNOSIS — M54.50 LUMBAR BACK PAIN: Primary | ICD-10-CM

## 2023-07-14 RX ORDER — TRIAMCINOLONE ACETONIDE 40 MG/ML
160 INJECTION, SUSPENSION INTRA-ARTICULAR; INTRAMUSCULAR ONCE
Status: COMPLETED | OUTPATIENT
Start: 2023-07-14 | End: 2023-07-14

## 2023-07-14 RX ADMIN — TRIAMCINOLONE ACETONIDE 160 MG: 40 INJECTION, SUSPENSION INTRA-ARTICULAR; INTRAMUSCULAR at 11:25

## 2023-07-14 NOTE — PROGRESS NOTES
Date: 07/14/23   Name: Jenelle Park    Pre-Procedural Diagnosis:    Diagnosis Orders   1. Lumbar back pain  triamcinolone acetonide (KENALOG-40) injection 160 mg    INJECT TRIGGER POINT, 1 OR 2          LUMBAR TRIGGER POINT INJECTION(S)    Patient presents for repeat lumbar trigger point injections. Had a nice response to injections 3 months ago. Brief exam is consistent with above impression. Will repeat injections today. Trigger Point Injection(s):  After informed oral consent, patient was prepped per routine. The bilateral lumbar paraspinal area(s) were injected. 80 mg of Kenalog with 1 cc of 0.25% Marcaine were injected at each site. Patient tolerated well. Band aid(s) applied. A total of two muscles/sites injected today for trigger point charge. A total of 4 cc of Kenalog were administered during this procedure.     Claudia Ludwig MD

## 2023-07-25 DIAGNOSIS — I26.99 RECURRENT PULMONARY EMBOLISM (HCC): Primary | ICD-10-CM

## 2023-08-03 ENCOUNTER — TELEPHONE (OUTPATIENT)
Dept: ORTHOPEDIC SURGERY | Age: 68
End: 2023-08-03

## 2023-08-09 ENCOUNTER — NURSE ONLY (OUTPATIENT)
Dept: UROLOGY | Age: 68
End: 2023-08-09

## 2023-08-09 DIAGNOSIS — N40.1 BENIGN PROSTATIC HYPERPLASIA WITH URINARY OBSTRUCTION: ICD-10-CM

## 2023-08-09 DIAGNOSIS — E29.1 HYPOGONADISM IN MALE: Primary | ICD-10-CM

## 2023-08-09 DIAGNOSIS — E29.1 HYPOGONADISM IN MALE: ICD-10-CM

## 2023-08-09 DIAGNOSIS — N13.8 BENIGN PROSTATIC HYPERPLASIA WITH URINARY OBSTRUCTION: ICD-10-CM

## 2023-08-09 LAB
ALBUMIN SERPL-MCNC: 3.2 G/DL (ref 3.2–4.6)
ALBUMIN/GLOB SERPL: 0.8 (ref 0.4–1.6)
ALP SERPL-CCNC: 85 U/L (ref 50–136)
ALT SERPL-CCNC: 29 U/L (ref 12–65)
AST SERPL-CCNC: 19 U/L (ref 15–37)
BASOPHILS # BLD: 0 K/UL (ref 0–0.2)
BASOPHILS NFR BLD: 0 % (ref 0–2)
BILIRUB DIRECT SERPL-MCNC: 0.1 MG/DL
BILIRUB SERPL-MCNC: 0.3 MG/DL (ref 0.2–1.1)
DIFFERENTIAL METHOD BLD: NORMAL
EOSINOPHIL # BLD: 0.1 K/UL (ref 0–0.8)
EOSINOPHIL NFR BLD: 1 % (ref 0.5–7.8)
ERYTHROCYTE [DISTWIDTH] IN BLOOD BY AUTOMATED COUNT: 14.3 % (ref 11.9–14.6)
GLOBULIN SER CALC-MCNC: 4.1 G/DL (ref 2.8–4.5)
HCT VFR BLD AUTO: 43.5 % (ref 41.1–50.3)
HGB BLD-MCNC: 13.7 G/DL (ref 13.6–17.2)
IMM GRANULOCYTES # BLD AUTO: 0.1 K/UL (ref 0–0.5)
IMM GRANULOCYTES NFR BLD AUTO: 1 % (ref 0–5)
LYMPHOCYTES # BLD: 2.1 K/UL (ref 0.5–4.6)
LYMPHOCYTES NFR BLD: 21 % (ref 13–44)
MCH RBC QN AUTO: 31.5 PG (ref 26.1–32.9)
MCHC RBC AUTO-ENTMCNC: 31.5 G/DL (ref 31.4–35)
MCV RBC AUTO: 100 FL (ref 82–102)
MONOCYTES # BLD: 0.9 K/UL (ref 0.1–1.3)
MONOCYTES NFR BLD: 9 % (ref 4–12)
NEUTS SEG # BLD: 6.6 K/UL (ref 1.7–8.2)
NEUTS SEG NFR BLD: 68 % (ref 43–78)
NRBC # BLD: 0 K/UL (ref 0–0.2)
PLATELET # BLD AUTO: 212 K/UL (ref 150–450)
PMV BLD AUTO: 10.3 FL (ref 9.4–12.3)
PROT SERPL-MCNC: 7.3 G/DL (ref 6.3–8.2)
PSA SERPL-MCNC: 0.1 NG/ML
RBC # BLD AUTO: 4.35 M/UL (ref 4.23–5.6)
WBC # BLD AUTO: 9.7 K/UL (ref 4.3–11.1)

## 2023-08-15 ENCOUNTER — TELEPHONE (OUTPATIENT)
Dept: PULMONOLOGY | Age: 68
End: 2023-08-15

## 2023-08-15 ENCOUNTER — OFFICE VISIT (OUTPATIENT)
Dept: PULMONOLOGY | Age: 68
End: 2023-08-15
Payer: MEDICARE

## 2023-08-15 VITALS
RESPIRATION RATE: 20 BRPM | OXYGEN SATURATION: 98 % | WEIGHT: 202 LBS | HEIGHT: 67 IN | HEART RATE: 118 BPM | TEMPERATURE: 98 F | DIASTOLIC BLOOD PRESSURE: 80 MMHG | BODY MASS INDEX: 31.71 KG/M2 | SYSTOLIC BLOOD PRESSURE: 140 MMHG

## 2023-08-15 DIAGNOSIS — I26.99 BILATERAL PULMONARY EMBOLISM (HCC): ICD-10-CM

## 2023-08-15 DIAGNOSIS — G47.33 OBSTRUCTIVE SLEEP APNEA SYNDROME: ICD-10-CM

## 2023-08-15 DIAGNOSIS — Z87.891 HISTORY OF CIGARETTE SMOKING: ICD-10-CM

## 2023-08-15 DIAGNOSIS — J45.30 MILD PERSISTENT ASTHMA WITHOUT COMPLICATION: Primary | ICD-10-CM

## 2023-08-15 LAB
TESTOST FREE SERPL-MCNC: <0.2 PG/ML (ref 6.6–18.1)
TESTOST SERPL-MCNC: <3 NG/DL (ref 264–916)

## 2023-08-15 PROCEDURE — 4004F PT TOBACCO SCREEN RCVD TLK: CPT | Performed by: INTERNAL MEDICINE

## 2023-08-15 PROCEDURE — 99406 BEHAV CHNG SMOKING 3-10 MIN: CPT | Performed by: INTERNAL MEDICINE

## 2023-08-15 PROCEDURE — G8417 CALC BMI ABV UP PARAM F/U: HCPCS | Performed by: INTERNAL MEDICINE

## 2023-08-15 PROCEDURE — 3077F SYST BP >= 140 MM HG: CPT | Performed by: INTERNAL MEDICINE

## 2023-08-15 PROCEDURE — G8427 DOCREV CUR MEDS BY ELIG CLIN: HCPCS | Performed by: INTERNAL MEDICINE

## 2023-08-15 PROCEDURE — 99214 OFFICE O/P EST MOD 30 MIN: CPT | Performed by: INTERNAL MEDICINE

## 2023-08-15 PROCEDURE — 3017F COLORECTAL CA SCREEN DOC REV: CPT | Performed by: INTERNAL MEDICINE

## 2023-08-15 PROCEDURE — 3079F DIAST BP 80-89 MM HG: CPT | Performed by: INTERNAL MEDICINE

## 2023-08-15 PROCEDURE — 1123F ACP DISCUSS/DSCN MKR DOCD: CPT | Performed by: INTERNAL MEDICINE

## 2023-08-15 RX ORDER — NICOTINE 21 MG/24HR
1 PATCH, TRANSDERMAL 24 HOURS TRANSDERMAL DAILY
Qty: 14 PATCH | Refills: 3 | Status: SHIPPED | OUTPATIENT
Start: 2023-08-15

## 2023-08-15 RX ORDER — FLUTICASONE FUROATE AND VILANTEROL 200; 25 UG/1; UG/1
1 POWDER RESPIRATORY (INHALATION) DAILY
Qty: 1 EACH | Refills: 11 | Status: SHIPPED | OUTPATIENT
Start: 2023-08-15

## 2023-08-15 NOTE — PROGRESS NOTES
Name:  Gaudencio Allison  YOB: 1955   MRN: [id number]      Office Visit: 8/15/2023        ASSESSMENT AND PLAN:  (Medical Decision Making)    Impression: 70 y.o. male with recurrent VTE episodes of chronic Eliquis, mild persistent asthma on albuterol alone, sleep apnea which she has not followed up with CPAP initiation and ongoing intermittent smoking. 1. Mild persistent asthma without complication  Had positive bronchodilator response on PFTs earlier this year, using albuterol nearly daily. Recommend addition of a daily inhaler for better control. Discussed with him asthma education on symptom control and titration of medicine. We will have him follow-up in 3 months.  - fluticasone furoate-vilanterol (BREO ELLIPTA) 200-25 MCG/ACT AEPB inhaler; Inhale 1 puff into the lungs daily  Dispense: 1 each; Refill: 11    2. Bilateral pulmonary embolism (HCC)  On Eliquis long-term due to recurrent VTE episodes    3. Obstructive sleep apnea syndrome  Has not followed up with [facility] and was removed from their list as he was unable to be contacted. We will resend the request as he is willing to pursue at this point. 4. History of cigarette smoking  Total smoking cessation is advised. Discussed various smoking cessation products including pills, patches, inhaler, gum, e-cigarettes, weaning self off, \"cold turkey\", and smoking cessation classes. Discussed also with patient disease risk of ongoing smoking including CVA, lung cancer, and heart disease. At this point, patient's commitment to quitting is moderate. Approximately 3 minutes were spent counseling patient regarding smoking cessation. Smok    You for repeat lung cancer screening March 2024. Order entered. - nicotine (NICODERM CQ) 14 MG/24HR; Place 1 patch onto the skin daily  Dispense: 14 patch; Refill: 3  - SMOKING AND TOBACCO USE CESSATION 3 - 10 MINUTES  - CT LUNG SCREENING;  Future    Orders Placed This Encounter   Medications

## 2023-08-15 NOTE — PATIENT INSTRUCTIONS
We understand that insurance companies have different 'preferred' medications. These preferences can change yearly and can be difficult to track. Depending upon your insurance and their preferred medicines, some medications we prescribe may be too expensive. If this happens, we recommend that you find out what inhalers for COPD or asthma are \"preferred\" on your insurance drug formulary by calling the member services phone number on the back of the insurance card. The cost of your medication can be dramatically different depending on this. Once the preferred inhalers are known, please send us a message in [facility] or call us back at [phone] with this information. We will then choose the best option available for you and send that prescription to your pharmacy on file. ICS Inhalers:  Fluticasone inhaler (Flovent, Arnuity)  Qvar  Pulmicort  Asmanex  Alvesco    ICS/LABA Inhalers:  Fluticasone/Salmeterol inhaler (Advair, Airduo, Wixela)  Symbicort  Dulera  Breo    LABA/LAMA Inhalers:  Stiolto  Anoro  Bevespi  Duaklir    ICS/LABA/LAMA Inhalers:  Trelegy  Breztri    Short Acting Inhaler:  Albuterol (ProAir HFA/Respiclick/Digihaler, Ventolin, Proventil)  Levalbuterol (Xopenex)  Atrovent  Combivent        The company who will be taking care of your CPAP supplies is:     Address: [address] [place] [address] [address], [place], [address] [place] [address]  Phone: [phone] Option #1  Fax: [phone]

## 2023-08-15 NOTE — TELEPHONE ENCOUNTER
Called [facility] and informed pharmacist that patients insurance prefers Brand Name [employer] over the generic. She states that they are processing the Brand Name for the patient now. Patient will be contacted once ready for .

## 2023-08-15 NOTE — TELEPHONE ENCOUNTER
PA for Breo start via CoverMyMeds   Case ID: Ramone Kuo to reply:  August 15, 2024 9:48 AM (In 1 year)  Payer:  [facility]    [phone]   Human Electronic PA Form    No PA Required for Name Leeland Reason  Note from payer: The case has been cancelled/closed by health plan/payer/processor/PBM.   PayerNathen Basset - Medicare    [phone]

## 2023-08-28 ENCOUNTER — OFFICE VISIT (OUTPATIENT)
Dept: ORTHOPEDIC SURGERY | Age: 68
End: 2023-08-28
Payer: MEDICARE

## 2023-08-28 DIAGNOSIS — M54.50 LUMBAR BACK PAIN: Primary | ICD-10-CM

## 2023-08-28 DIAGNOSIS — M60.9 MYOFASCIITIS: ICD-10-CM

## 2023-08-28 PROCEDURE — G8417 CALC BMI ABV UP PARAM F/U: HCPCS | Performed by: PHYSICAL MEDICINE & REHABILITATION

## 2023-08-28 PROCEDURE — G8428 CUR MEDS NOT DOCUMENT: HCPCS | Performed by: PHYSICAL MEDICINE & REHABILITATION

## 2023-08-28 PROCEDURE — 3017F COLORECTAL CA SCREEN DOC REV: CPT | Performed by: PHYSICAL MEDICINE & REHABILITATION

## 2023-08-28 PROCEDURE — 4004F PT TOBACCO SCREEN RCVD TLK: CPT | Performed by: PHYSICAL MEDICINE & REHABILITATION

## 2023-08-28 PROCEDURE — 99212 OFFICE O/P EST SF 10 MIN: CPT | Performed by: PHYSICAL MEDICINE & REHABILITATION

## 2023-08-28 PROCEDURE — 1123F ACP DISCUSS/DSCN MKR DOCD: CPT | Performed by: PHYSICAL MEDICINE & REHABILITATION

## 2023-08-28 PROCEDURE — 20552 NJX 1/MLT TRIGGER POINT 1/2: CPT | Performed by: PHYSICAL MEDICINE & REHABILITATION

## 2023-08-28 RX ORDER — TRAMADOL HYDROCHLORIDE 50 MG/1
50 TABLET ORAL 2 TIMES DAILY PRN
Qty: 30 TABLET | Refills: 1 | Status: SHIPPED | OUTPATIENT
Start: 2023-08-28 | End: 2023-09-27

## 2023-08-28 RX ORDER — METHYLPREDNISOLONE ACETATE 40 MG/ML
160 INJECTION, SUSPENSION INTRA-ARTICULAR; INTRALESIONAL; INTRAMUSCULAR; SOFT TISSUE ONCE
Status: COMPLETED | OUTPATIENT
Start: 2023-08-28 | End: 2023-08-28

## 2023-08-28 RX ADMIN — METHYLPREDNISOLONE ACETATE 160 MG: 40 INJECTION, SUSPENSION INTRA-ARTICULAR; INTRALESIONAL; INTRAMUSCULAR; SOFT TISSUE at 14:23

## 2023-08-28 NOTE — PROGRESS NOTES
Date:  08/28/23      Diagnosis Orders   1. Lumbar back pain  INJECT TRIGGER POINT, 1 OR 2    methylPREDNISolone acetate (DEPO-MEDROL) injection 160 mg      2. Myofasciitis            HPI:  I am seeing Haider Meadows in evalution/folowup. I last saw him 5 months ago. He has pain the lower lumbar paraspinal areas for which I will check he had a nice response to Non-fluoroscopically guided trigger point injections in April. He would like to have those repeated. The pain is similar to what he described in the past.  He does have some weakness in the legs when he ambulates but appears to be stable. We will contemplate an MRI of the lumbar spine about a year ago but he had problems with DVT/PE then with a recurrent issue in March for which we have just been following along. He would like to say does for another 6-12 months then decide if he wants to check an MRI lumbar spine, noting he really would prefer not have back surgery unless he has to. I think that is reasonable. ROS: Unaware of any changes since last being seen    PE: Alert and cooperative. Good strength lower extremities. No sensory findings. Has tenderness lower lumbar paraspinal areas. Assessment/Plan:     PREDOMINANTLY MUSCULOSKELETAL LUMBOSACRAL SPINE PAIN. He is having favorable response to trigger point injections I would repeat those today. Follow-up 8 months. Can reinject as required. We will rediscuss at that time or if he has any changes before then, he will call us. He has requested a refill on his tramadol which she takes very sparingly. I am fine with that. He denies any side effects from it. Trigger Point Injection(s):  After informed oral consent, patient was prepped per routine. The bilateral lumbar paraspinal area(s) were injected. 80 mg of DepoMedrol with 1 cc of 0.25% Marcaine injected at each site. Patient tolerated well. Band aid(s) applied.       A total of two muscles/sites injected today for trigger point

## 2023-09-18 ENCOUNTER — OFFICE VISIT (OUTPATIENT)
Dept: FAMILY MEDICINE CLINIC | Facility: CLINIC | Age: 68
End: 2023-09-18
Payer: MEDICARE

## 2023-09-18 ENCOUNTER — OFFICE VISIT (OUTPATIENT)
Dept: UROLOGY | Age: 68
End: 2023-09-18
Payer: MEDICARE

## 2023-09-18 VITALS
HEIGHT: 67 IN | SYSTOLIC BLOOD PRESSURE: 148 MMHG | WEIGHT: 204.4 LBS | DIASTOLIC BLOOD PRESSURE: 96 MMHG | OXYGEN SATURATION: 98 % | BODY MASS INDEX: 32.08 KG/M2 | HEART RATE: 115 BPM

## 2023-09-18 DIAGNOSIS — E78.2 MIXED HYPERLIPIDEMIA: ICD-10-CM

## 2023-09-18 DIAGNOSIS — Z12.11 COLON CANCER SCREENING: ICD-10-CM

## 2023-09-18 DIAGNOSIS — I10 PRIMARY HYPERTENSION: Primary | ICD-10-CM

## 2023-09-18 DIAGNOSIS — Z86.711 HISTORY OF PULMONARY EMBOLISM: ICD-10-CM

## 2023-09-18 DIAGNOSIS — Z23 NEED FOR INFLUENZA VACCINATION: ICD-10-CM

## 2023-09-18 DIAGNOSIS — N52.01 ERECTILE DYSFUNCTION DUE TO ARTERIAL INSUFFICIENCY: ICD-10-CM

## 2023-09-18 DIAGNOSIS — E29.1 HYPOGONADISM IN MALE: Primary | ICD-10-CM

## 2023-09-18 DIAGNOSIS — R73.09 ELEVATED GLUCOSE: ICD-10-CM

## 2023-09-18 DIAGNOSIS — E29.1 HYPOGONADISM IN MALE: ICD-10-CM

## 2023-09-18 LAB
BASOPHILS # BLD: 0 K/UL (ref 0–0.2)
BASOPHILS NFR BLD: 0 % (ref 0–2)
CHOLEST SERPL-MCNC: 234 MG/DL
DIFFERENTIAL METHOD BLD: ABNORMAL
EOSINOPHIL # BLD: 0.1 K/UL (ref 0–0.8)
EOSINOPHIL NFR BLD: 1 % (ref 0.5–7.8)
ERYTHROCYTE [DISTWIDTH] IN BLOOD BY AUTOMATED COUNT: 14.8 % (ref 11.9–14.6)
HCT VFR BLD AUTO: 47.2 % (ref 41.1–50.3)
HDLC SERPL-MCNC: 65 MG/DL (ref 40–60)
HDLC SERPL: 3.6
HGB BLD-MCNC: 14.7 G/DL (ref 13.6–17.2)
IMM GRANULOCYTES # BLD AUTO: 0.1 K/UL (ref 0–0.5)
IMM GRANULOCYTES NFR BLD AUTO: 1 % (ref 0–5)
LDLC SERPL CALC-MCNC: 140.8 MG/DL
LYMPHOCYTES # BLD: 1.7 K/UL (ref 0.5–4.6)
LYMPHOCYTES NFR BLD: 16 % (ref 13–44)
MCH RBC QN AUTO: 32.2 PG (ref 26.1–32.9)
MCHC RBC AUTO-ENTMCNC: 31.1 G/DL (ref 31.4–35)
MCV RBC AUTO: 103.3 FL (ref 82–102)
MONOCYTES # BLD: 0.8 K/UL (ref 0.1–1.3)
MONOCYTES NFR BLD: 7 % (ref 4–12)
NEUTS SEG # BLD: 8.1 K/UL (ref 1.7–8.2)
NEUTS SEG NFR BLD: 75 % (ref 43–78)
NRBC # BLD: 0 K/UL (ref 0–0.2)
PLATELET # BLD AUTO: 226 K/UL (ref 150–450)
PMV BLD AUTO: 10.3 FL (ref 9.4–12.3)
RBC # BLD AUTO: 4.57 M/UL (ref 4.23–5.6)
TRIGL SERPL-MCNC: 141 MG/DL (ref 35–150)
VLDLC SERPL CALC-MCNC: 28.2 MG/DL (ref 6–23)
WBC # BLD AUTO: 10.8 K/UL (ref 4.3–11.1)

## 2023-09-18 PROCEDURE — G8427 DOCREV CUR MEDS BY ELIG CLIN: HCPCS | Performed by: UROLOGY

## 2023-09-18 PROCEDURE — 99214 OFFICE O/P EST MOD 30 MIN: CPT | Performed by: FAMILY MEDICINE

## 2023-09-18 PROCEDURE — 4004F PT TOBACCO SCREEN RCVD TLK: CPT | Performed by: FAMILY MEDICINE

## 2023-09-18 PROCEDURE — G8427 DOCREV CUR MEDS BY ELIG CLIN: HCPCS | Performed by: FAMILY MEDICINE

## 2023-09-18 PROCEDURE — 3077F SYST BP >= 140 MM HG: CPT | Performed by: FAMILY MEDICINE

## 2023-09-18 PROCEDURE — G8417 CALC BMI ABV UP PARAM F/U: HCPCS | Performed by: FAMILY MEDICINE

## 2023-09-18 PROCEDURE — 99214 OFFICE O/P EST MOD 30 MIN: CPT | Performed by: UROLOGY

## 2023-09-18 PROCEDURE — G8417 CALC BMI ABV UP PARAM F/U: HCPCS | Performed by: UROLOGY

## 2023-09-18 PROCEDURE — 4004F PT TOBACCO SCREEN RCVD TLK: CPT | Performed by: UROLOGY

## 2023-09-18 PROCEDURE — 1123F ACP DISCUSS/DSCN MKR DOCD: CPT | Performed by: UROLOGY

## 2023-09-18 PROCEDURE — 90694 VACC AIIV4 NO PRSRV 0.5ML IM: CPT | Performed by: FAMILY MEDICINE

## 2023-09-18 PROCEDURE — 1123F ACP DISCUSS/DSCN MKR DOCD: CPT | Performed by: FAMILY MEDICINE

## 2023-09-18 PROCEDURE — 3080F DIAST BP >= 90 MM HG: CPT | Performed by: FAMILY MEDICINE

## 2023-09-18 PROCEDURE — 3017F COLORECTAL CA SCREEN DOC REV: CPT | Performed by: FAMILY MEDICINE

## 2023-09-18 PROCEDURE — 3017F COLORECTAL CA SCREEN DOC REV: CPT | Performed by: UROLOGY

## 2023-09-18 PROCEDURE — G0008 ADMIN INFLUENZA VIRUS VAC: HCPCS | Performed by: FAMILY MEDICINE

## 2023-09-18 RX ORDER — LISINOPRIL AND HYDROCHLOROTHIAZIDE 20; 12.5 MG/1; MG/1
1 TABLET ORAL DAILY
Qty: 90 TABLET | Refills: 1 | Status: SHIPPED | OUTPATIENT
Start: 2023-09-18 | End: 2024-03-16

## 2023-09-18 RX ORDER — ATORVASTATIN CALCIUM 80 MG/1
80 TABLET, FILM COATED ORAL DAILY
Qty: 90 TABLET | Refills: 1 | Status: SHIPPED | OUTPATIENT
Start: 2023-09-18 | End: 2024-03-16

## 2023-09-18 RX ORDER — LISINOPRIL 20 MG/1
20 TABLET ORAL DAILY
Qty: 90 TABLET | Refills: 1 | Status: SHIPPED | OUTPATIENT
Start: 2023-09-18 | End: 2023-09-18

## 2023-09-18 ASSESSMENT — ENCOUNTER SYMPTOMS
RESPIRATORY NEGATIVE: 1
WHEEZING: 0
COUGH: 0
INDIGESTION: 0
CONSTIPATION: 0
SHORTNESS OF BREATH: 0
SKIN LESIONS: 0
DIARRHEA: 0
VOMITING: 0
HEARTBURN: 0
NAUSEA: 0
BACK PAIN: 0
EYE DISCHARGE: 0
BLOOD IN STOOL: 0
EYE PAIN: 0
ABDOMINAL PAIN: 0

## 2023-09-18 ASSESSMENT — PATIENT HEALTH QUESTIONNAIRE - PHQ9
SUM OF ALL RESPONSES TO PHQ QUESTIONS 1-9: 0
1. LITTLE INTEREST OR PLEASURE IN DOING THINGS: 0
SUM OF ALL RESPONSES TO PHQ QUESTIONS 1-9: 0
SUM OF ALL RESPONSES TO PHQ QUESTIONS 1-9: 0
SUM OF ALL RESPONSES TO PHQ9 QUESTIONS 1 & 2: 0
2. FEELING DOWN, DEPRESSED OR HOPELESS: 0
SUM OF ALL RESPONSES TO PHQ QUESTIONS 1-9: 0

## 2023-09-18 NOTE — PROGRESS NOTES
[facility]  [address]  [facility]  [phone]          Trever Stewart  : 1955    Chief Complaint   Patient presents with    Follow-up          HPI     Trever Stewart is a 70 y.o. male with symptomatic hypogonadism who presents to clinic for follow up. He previously followed with NP Yaz Tyler and was on testosterone 200 mg IM q10 days. His last level on 20 was > 1500 with a Hb 18. 3. He was sent to Dr. Lynne León with Hematology who agreed that it was likely due to his very elevated T levels. TRT was stopped and levels normalized. He was then restarted on 200 mg IM TRT q14 days and gets his injections at our office. Hct was again elevated and dose decreased to 100 mg q14 days. Today, he reports that he has been OFF TRT since 3/2023 due to blood clots / pulmonary emboli. He follows with hematology and is on eliquis now indefinitely. Wants to discuss pros/cons of resuming TRT today. Denies significant urgency, frequency, urinary retention, hematuria, urinary incontinence or other symptoms/concerns. Does report VERY bothersome fatigue/low libido. States he has to resume TRT despite all risks of medication (including blood clots) and feels that his quality of life is very poor without TRT. Of note, he does report ED and is not on any medication at this time for it. Does not want ED medication today. He also reports passing a kidney stone a few weeks back without difficulty.      Lab Results   Component Value Date    PSA 0.1 2023    PSA 0.7 08/10/2022    PSA 1.2 2021    PSA 0.7 2021    PSA 0.9 2020    PSA 1.0 2020       Past Medical History:   Diagnosis Date    Arthritis     Asthma     Calculus of kidney     Hypercholesterolemia     Hypertension      Past Surgical History:   Procedure Laterality Date    HEENT Bilateral     cataracts    IR THROMB [facility] VE[facility]  2022    IR THROMB [id number] [facility] 2022

## 2023-09-18 NOTE — PROGRESS NOTES
PROGRESS NOTE    SUBJECTIVE:   Charla Aggarwal is a 70 y.o. male seen for a follow up visit regarding   Chief Complaint   Patient presents with    Hyperlipidemia     3 month follow up         HPI:  Here for follow-up today, pleasant 58-year-old male with history of hypertension, CAD, PAD, diastolic dysfunction, hyperlipidemia, history of multiple pulmonary emboli, history of hypogonadism, history of erythrocytosis on injectable testosterone therapy, and primary osteoarthritis of weightbearing joints. He continues to complain of fatigue and weakness. He thinks this is due to his low T. He does not fact have very low testosterone based on recent lab. He continues to smoke presently. He is anticoagulated on Eliquis. He very much would like to get back on his testosterone replacement therapy. Reviewed and updated this visit by provider:  Tobacco  Allergies  Meds  Problems  Med Hx  Surg Hx  Fam Hx           Review of Systems   Respiratory: Negative. Cardiovascular: Negative. OBJECTIVE:  Vitals:    09/18/23 1110   BP: (!) 148/96   Site: Left Upper Arm   Cuff Size: Medium Adult   Pulse: (!) 115   SpO2: 98%   Weight: 204 lb 6.4 oz (92.7 kg)   Height: 5' 7\" (1.702 m)        Physical Exam   General: Alert and oriented x3, well-appearing  Neck: No adenopathy, thyromegaly or thyroid nodules  Pulmonary: Normal effort, good airflow, no rales or rhonchi  CVS: Regular rate and rhythm, normal S1, S2, no S3 or S4, no murmurs; no carotid bruits, 2+ pedal pulses      Medical problems and test results were reviewed with the patient today. No results found for this or any previous visit (from the past 672 hour(s)). No results found for any visits on 09/18/23. ASSESSMENT and PLAN    1. Primary hypertension, not at goal, will stop lisinopril and switch to lisinopril/hydrochlorothiazide  -     lisinopril-hydroCHLOROthiazide (PRINZIDE;ZESTORETIC) 20-12.5 MG per tablet;  Take 1 tablet by mouth

## 2023-09-19 ENCOUNTER — TELEPHONE (OUTPATIENT)
Dept: UROLOGY | Age: 68
End: 2023-09-19

## 2023-09-19 DIAGNOSIS — R35.0 BENIGN PROSTATIC HYPERPLASIA WITH URINARY FREQUENCY: ICD-10-CM

## 2023-09-19 DIAGNOSIS — N40.1 BENIGN PROSTATIC HYPERPLASIA WITH URINARY FREQUENCY: ICD-10-CM

## 2023-09-19 DIAGNOSIS — E29.1 HYPOGONADISM IN MALE: Primary | ICD-10-CM

## 2023-09-19 LAB
EST. AVERAGE GLUCOSE BLD GHB EST-MCNC: 120 MG/DL
HBA1C MFR BLD: 5.8 % (ref 4.8–5.6)

## 2023-09-19 NOTE — TELEPHONE ENCOUNTER
Called patient after speaking with Dr. Chika Goyal. We will resume patient's TRT as long as patient is compliant with blood thinner medication to mitigate clotting risk. He wants to resume 100 mg q14 day testosterone injections at our office. Erica Schultz, can you please get this set up for him. He will then need follow up with me in 3 months with testosterone labs to be done at day 7 (half way ) through his injection cycle. Lab orders placed today. Thanks!   Everett

## 2023-10-02 ENCOUNTER — NURSE ONLY (OUTPATIENT)
Dept: UROLOGY | Age: 68
End: 2023-10-02
Payer: MEDICARE

## 2023-10-02 DIAGNOSIS — E29.1 HYPOGONADISM IN MALE: Primary | ICD-10-CM

## 2023-10-02 PROCEDURE — 96372 THER/PROPH/DIAG INJ SC/IM: CPT | Performed by: UROLOGY

## 2023-10-02 RX ORDER — TESTOSTERONE CYPIONATE 200 MG/ML
100 INJECTION, SOLUTION INTRAMUSCULAR ONCE
Status: COMPLETED | OUTPATIENT
Start: 2023-10-02 | End: 2023-10-02

## 2023-10-02 RX ADMIN — TESTOSTERONE CYPIONATE 100 MG: 200 INJECTION, SOLUTION INTRAMUSCULAR at 10:15

## 2023-10-04 ENCOUNTER — TELEPHONE (OUTPATIENT)
Dept: FAMILY MEDICINE CLINIC | Facility: CLINIC | Age: 68
End: 2023-10-04

## 2023-10-04 ENCOUNTER — OFFICE VISIT (OUTPATIENT)
Dept: FAMILY MEDICINE CLINIC | Facility: CLINIC | Age: 68
End: 2023-10-04
Payer: MEDICARE

## 2023-10-04 VITALS
TEMPERATURE: 98.9 F | OXYGEN SATURATION: 97 % | HEIGHT: 67 IN | WEIGHT: 204 LBS | HEART RATE: 123 BPM | SYSTOLIC BLOOD PRESSURE: 148 MMHG | DIASTOLIC BLOOD PRESSURE: 86 MMHG | BODY MASS INDEX: 32.02 KG/M2

## 2023-10-04 DIAGNOSIS — I10 PRIMARY HYPERTENSION: ICD-10-CM

## 2023-10-04 DIAGNOSIS — J02.9 SORE THROAT: ICD-10-CM

## 2023-10-04 DIAGNOSIS — R05.1 ACUTE COUGH: ICD-10-CM

## 2023-10-04 DIAGNOSIS — J40 BRONCHITIS: Primary | ICD-10-CM

## 2023-10-04 LAB
EXP DATE SOLUTION: NORMAL
EXP DATE SWAB: NORMAL
EXPIRATION DATE: NORMAL
GROUP A STREP ANTIGEN, POC: NEGATIVE
INFLUENZA A ANTIGEN, POC: NEGATIVE
INFLUENZA B ANTIGEN, POC: NEGATIVE
LOT NUMBER POC: NORMAL
LOT NUMBER SOLUTION: NORMAL
LOT NUMBER SWAB: NORMAL
SARS-COV-2 RNA, POC: NEGATIVE
VALID INTERNAL CONTROL, POC: NORMAL
VALID INTERNAL CONTROL, POC: NORMAL

## 2023-10-04 PROCEDURE — 3079F DIAST BP 80-89 MM HG: CPT | Performed by: FAMILY MEDICINE

## 2023-10-04 PROCEDURE — G8484 FLU IMMUNIZE NO ADMIN: HCPCS | Performed by: FAMILY MEDICINE

## 2023-10-04 PROCEDURE — 99213 OFFICE O/P EST LOW 20 MIN: CPT | Performed by: FAMILY MEDICINE

## 2023-10-04 PROCEDURE — 3017F COLORECTAL CA SCREEN DOC REV: CPT | Performed by: FAMILY MEDICINE

## 2023-10-04 PROCEDURE — G8427 DOCREV CUR MEDS BY ELIG CLIN: HCPCS | Performed by: FAMILY MEDICINE

## 2023-10-04 PROCEDURE — 1123F ACP DISCUSS/DSCN MKR DOCD: CPT | Performed by: FAMILY MEDICINE

## 2023-10-04 PROCEDURE — 3077F SYST BP >= 140 MM HG: CPT | Performed by: FAMILY MEDICINE

## 2023-10-04 PROCEDURE — 87502 INFLUENZA DNA AMP PROBE: CPT | Performed by: FAMILY MEDICINE

## 2023-10-04 PROCEDURE — 87880 STREP A ASSAY W/OPTIC: CPT | Performed by: FAMILY MEDICINE

## 2023-10-04 PROCEDURE — 4004F PT TOBACCO SCREEN RCVD TLK: CPT | Performed by: FAMILY MEDICINE

## 2023-10-04 PROCEDURE — G8417 CALC BMI ABV UP PARAM F/U: HCPCS | Performed by: FAMILY MEDICINE

## 2023-10-04 PROCEDURE — 87635 SARS-COV-2 COVID-19 AMP PRB: CPT | Performed by: FAMILY MEDICINE

## 2023-10-04 RX ORDER — AMOXICILLIN AND CLAVULANATE POTASSIUM 875; 125 MG/1; MG/1
1 TABLET, FILM COATED ORAL 2 TIMES DAILY
Qty: 20 TABLET | Refills: 0 | Status: SHIPPED | OUTPATIENT
Start: 2023-10-04 | End: 2023-10-14

## 2023-10-04 RX ORDER — LISINOPRIL 20 MG/1
1 TABLET ORAL DAILY
COMMUNITY
Start: 2023-09-18 | End: 2023-10-04

## 2023-10-04 ASSESSMENT — PATIENT HEALTH QUESTIONNAIRE - PHQ9
SUM OF ALL RESPONSES TO PHQ QUESTIONS 1-9: 0
SUM OF ALL RESPONSES TO PHQ QUESTIONS 1-9: 0
SUM OF ALL RESPONSES TO PHQ9 QUESTIONS 1 & 2: 0
1. LITTLE INTEREST OR PLEASURE IN DOING THINGS: 0
SUM OF ALL RESPONSES TO PHQ QUESTIONS 1-9: 0
2. FEELING DOWN, DEPRESSED OR HOPELESS: 0
SUM OF ALL RESPONSES TO PHQ QUESTIONS 1-9: 0

## 2023-10-04 ASSESSMENT — ENCOUNTER SYMPTOMS
COUGH: 1
SHORTNESS OF BREATH: 0

## 2023-10-04 NOTE — PROGRESS NOTES
B Antigen, POC Negative Negative   AMB POC COVID-19 COV   Result Value Ref Range    SARS-COV-2 RNA, POC Negative     Lot number swab      EXP date swab      Lot number solution      EXP date solution      LOT NUMBER POC      EXPIRATION DATE     AMB POC RAPID STREP A   Result Value Ref Range    Valid Internal Control, POC Valid     Group A Strep Antigen, POC Negative Negative        ASSESSMENT and PLAN    1. Bronchitis  -     amoxicillin-clavulanate (AUGMENTIN) 875-125 MG per tablet; Take 1 tablet by mouth 2 times daily for 10 days, Disp-20 tablet, R-0Normal  2. Acute cough  -     AMB POC INFLUENZA A  AND B REAL-TIME RT-PCR  -     AMB POC COVID-19 COV  -     AMB POC RAPID STREP A  3. Sore throat  -     AMB POC INFLUENZA A  AND B REAL-TIME RT-PCR  -     AMB POC COVID-19 COV  -     AMB POC RAPID STREP A  4. Primary hypertension         No follow-ups on file.        Gita Jo MD

## 2023-10-04 NOTE — TELEPHONE ENCOUNTER
Pt complains of cough and congestion x 1 week. He denies any body aches or fever. He is aware Dr. Peggy Cervantes is fully booked today and is requesting a return call from nurse.

## 2023-10-16 ENCOUNTER — NURSE ONLY (OUTPATIENT)
Dept: UROLOGY | Age: 68
End: 2023-10-16
Payer: MEDICARE

## 2023-10-16 DIAGNOSIS — E29.1 HYPOGONADISM IN MALE: Primary | ICD-10-CM

## 2023-10-16 PROCEDURE — 96372 THER/PROPH/DIAG INJ SC/IM: CPT | Performed by: UROLOGY

## 2023-10-16 RX ORDER — TESTOSTERONE CYPIONATE 200 MG/ML
200 INJECTION, SOLUTION INTRAMUSCULAR ONCE
Status: COMPLETED | OUTPATIENT
Start: 2023-10-16 | End: 2023-10-16

## 2023-10-16 RX ADMIN — TESTOSTERONE CYPIONATE 200 MG: 200 INJECTION, SOLUTION INTRAMUSCULAR at 10:42

## 2023-11-08 ENCOUNTER — TELEPHONE (OUTPATIENT)
Dept: FAMILY MEDICINE CLINIC | Facility: CLINIC | Age: 68
End: 2023-11-08

## 2023-11-13 ENCOUNTER — NURSE ONLY (OUTPATIENT)
Dept: UROLOGY | Age: 68
End: 2023-11-13
Payer: MEDICARE

## 2023-11-13 DIAGNOSIS — E29.1 HYPOGONADISM IN MALE: Primary | ICD-10-CM

## 2023-11-13 PROCEDURE — 96372 THER/PROPH/DIAG INJ SC/IM: CPT | Performed by: UROLOGY

## 2023-11-13 RX ORDER — TESTOSTERONE CYPIONATE 200 MG/ML
200 INJECTION, SOLUTION INTRAMUSCULAR ONCE
Status: COMPLETED | OUTPATIENT
Start: 2023-11-13 | End: 2023-11-13

## 2023-11-13 RX ADMIN — TESTOSTERONE CYPIONATE 100 MG: 200 INJECTION, SOLUTION INTRAMUSCULAR at 11:01

## 2023-11-13 NOTE — PROGRESS NOTES
Per 's lab result note from 09/19/2023, patient due to receive 100mg testosterone injection every 3 weeks. Patient received injection IM RUOQ today without incident. Testosterone injections authorized  in schedule note.

## 2023-11-22 ENCOUNTER — TELEPHONE (OUTPATIENT)
Dept: ORTHOPEDIC SURGERY | Age: 68
End: 2023-11-22

## 2023-11-27 ENCOUNTER — NURSE ONLY (OUTPATIENT)
Dept: UROLOGY | Age: 68
End: 2023-11-27
Payer: MEDICARE

## 2023-11-27 DIAGNOSIS — E29.1 HYPOGONADISM IN MALE: Primary | ICD-10-CM

## 2023-11-27 PROCEDURE — 96372 THER/PROPH/DIAG INJ SC/IM: CPT | Performed by: UROLOGY

## 2023-11-27 RX ORDER — TESTOSTERONE CYPIONATE 200 MG/ML
100 INJECTION, SOLUTION INTRAMUSCULAR ONCE
Status: COMPLETED | OUTPATIENT
Start: 2023-11-27 | End: 2023-11-27

## 2023-11-27 RX ADMIN — TESTOSTERONE CYPIONATE 100 MG: 200 INJECTION, SOLUTION INTRAMUSCULAR at 11:16

## 2023-11-27 NOTE — PROGRESS NOTES
Per 's lab result note from 09/19/2023, patient due to receive 100mg testosterone injection every 3 weeks. Patient received injection IM LUOQ today without incident. Testosterone injections authorized  in schedule note.

## 2023-11-28 ENCOUNTER — OFFICE VISIT (OUTPATIENT)
Dept: ORTHOPEDIC SURGERY | Age: 68
End: 2023-11-28
Payer: MEDICARE

## 2023-11-28 DIAGNOSIS — M54.50 LUMBAR BACK PAIN: Primary | ICD-10-CM

## 2023-11-28 DIAGNOSIS — M60.9 MYOSITIS, UNSPECIFIED MYOSITIS TYPE, UNSPECIFIED SITE: ICD-10-CM

## 2023-11-28 PROCEDURE — 20552 NJX 1/MLT TRIGGER POINT 1/2: CPT | Performed by: PHYSICAL MEDICINE & REHABILITATION

## 2023-11-28 RX ORDER — TRIAMCINOLONE ACETONIDE 40 MG/ML
160 INJECTION, SUSPENSION INTRA-ARTICULAR; INTRAMUSCULAR ONCE
Status: COMPLETED | OUTPATIENT
Start: 2023-11-28 | End: 2023-11-28

## 2023-11-28 RX ADMIN — TRIAMCINOLONE ACETONIDE 160 MG: 40 INJECTION, SUSPENSION INTRA-ARTICULAR; INTRAMUSCULAR at 11:45

## 2023-11-28 NOTE — PROGRESS NOTES
Date: 11/28/23   Name: Zaira Dominguez    Pre-Procedural Diagnosis:    Diagnosis Orders   1. Lumbar back pain  triamcinolone acetonide (KENALOG-40) injection 160 mg    INJECT TRIGGER POINT, 1 OR 2      2. Myositis, unspecified myositis type, unspecified site            LUMBAR TRIGGER POINT INJECTION(S)    Patient presents for repeat lumbar trigger point injections. Had a nice response to injections 2-3 months ago. Brief exam is consistent with above impression. Will repeat injections today. Trigger Point Injection(s):  After informed oral consent, patient was prepped per routine. The bilateral lumbar paraspinal area(s) were injected. 80 mg of Kenalog with 1 cc of 0.25% Marcaine were injected at each site. Patient tolerated well. Band aid(s) applied. A total of two muscles/sites injected today for trigger point charge. A total of 4 cc of Kenalog were administered during this procedure.     Jay Chinchilla MD

## 2023-12-06 NOTE — PROGRESS NOTES
(CARDIA)     Difficulty of Paying Living Expenses: Not hard at all   Food Insecurity: Not on file (3/21/2023)   Transportation Needs: Unknown (3/21/2023)    PRAPARE - Transportation     Lack of Transportation (Medical): Not on file     Lack of Transportation (Non-Medical): No   Physical Activity: Inactive (1/16/2023)    Exercise Vital Sign     Days of Exercise per Week: 0 days     Minutes of Exercise per Session: 0 min   Stress: Not on file   Social Connections: Not on file   Intimate Partner Violence: Not on file   Housing Stability: Unknown (3/21/2023)    Housing Stability Vital Sign     Unable to Pay for Housing in the Last Year: Not on file     Number of Places Lived in the Last Year: Not on file     Unstable Housing in the Last Year: No     Family History   Problem Relation Age of Onset    Asthma Mother     Thyroid Disease Mother     COPD Mother     Heart Disease Father     Hypertension Father     Cancer Neg Hx     Deep Vein Thrombosis Neg Hx        Review of Systems  Constitutional:   Negative for fever, chills, appetite change, malaise/fatigue, headaches and weight loss. Skin:  Negative for skin lesions, rash and itching. Eyes:  Negative for visual disturbance, eye pain and eye discharge. ENT:  Negative for difficulty articulating words, pain swallowing, high frequency hearing loss and dry mouth. Respiratory:  Negative for cough, blood in sputum, shortness of breath and wheezing. Cardiovascular:  Negative for chest pain, hypertension, irregular heartbeat, leg pain, leg swelling, regular rate and rhythm and varicose veins. GI:  Negative for nausea, vomiting, abdominal pain, blood in stool, constipation, diarrhea, indigestion and heartburn. Genitourinary: Positive for erectile dysfunction.  Negative for urinary burning, hematuria, flank pain, recurrent UTIs, history of urolithiasis, nocturia, slower stream, straining, urgency, leakage w/ urge, frequent urination, incomplete emptying, testicular pain,

## 2023-12-11 ENCOUNTER — OFFICE VISIT (OUTPATIENT)
Dept: UROLOGY | Age: 68
End: 2023-12-11
Payer: MEDICARE

## 2023-12-11 DIAGNOSIS — E29.1 HYPOGONADISM IN MALE: Primary | ICD-10-CM

## 2023-12-11 DIAGNOSIS — N40.1 BENIGN PROSTATIC HYPERPLASIA WITH URINARY FREQUENCY: ICD-10-CM

## 2023-12-11 DIAGNOSIS — N52.35 ERECTILE DYSFUNCTION FOLLOWING RADIATION THERAPY: ICD-10-CM

## 2023-12-11 DIAGNOSIS — R35.0 BENIGN PROSTATIC HYPERPLASIA WITH URINARY FREQUENCY: ICD-10-CM

## 2023-12-11 DIAGNOSIS — E29.1 HYPOGONADISM IN MALE: ICD-10-CM

## 2023-12-11 LAB
ALBUMIN SERPL-MCNC: 3.6 G/DL (ref 3.2–4.6)
ALBUMIN/GLOB SERPL: 0.9 (ref 0.4–1.6)
ALP SERPL-CCNC: 66 U/L (ref 50–136)
ALT SERPL-CCNC: 25 U/L (ref 12–65)
ANION GAP SERPL CALC-SCNC: 6 MMOL/L (ref 2–11)
AST SERPL-CCNC: 21 U/L (ref 15–37)
BILIRUB DIRECT SERPL-MCNC: 0.1 MG/DL
BILIRUB SERPL-MCNC: 0.6 MG/DL (ref 0.2–1.1)
BUN SERPL-MCNC: 22 MG/DL (ref 8–23)
CALCIUM SERPL-MCNC: 10.5 MG/DL (ref 8.3–10.4)
CHLORIDE SERPL-SCNC: 109 MMOL/L (ref 103–113)
CO2 SERPL-SCNC: 25 MMOL/L (ref 21–32)
CREAT SERPL-MCNC: 1.3 MG/DL (ref 0.8–1.5)
ERYTHROCYTE [DISTWIDTH] IN BLOOD BY AUTOMATED COUNT: 14.6 % (ref 11.9–14.6)
GLOBULIN SER CALC-MCNC: 4 G/DL (ref 2.8–4.5)
GLUCOSE SERPL-MCNC: 101 MG/DL (ref 65–100)
HCT VFR BLD AUTO: 48.4 % (ref 41.1–50.3)
HGB BLD-MCNC: 14.9 G/DL (ref 13.6–17.2)
MCH RBC QN AUTO: 30.5 PG (ref 26.1–32.9)
MCHC RBC AUTO-ENTMCNC: 30.8 G/DL (ref 31.4–35)
MCV RBC AUTO: 99.2 FL (ref 82–102)
NRBC # BLD: 0 K/UL (ref 0–0.2)
PLATELET # BLD AUTO: 327 K/UL (ref 150–450)
PMV BLD AUTO: 10 FL (ref 9.4–12.3)
POTASSIUM SERPL-SCNC: 3.9 MMOL/L (ref 3.5–5.1)
PROT SERPL-MCNC: 7.6 G/DL (ref 6.3–8.2)
PSA SERPL-MCNC: 1.1 NG/ML
RBC # BLD AUTO: 4.88 M/UL (ref 4.23–5.6)
SODIUM SERPL-SCNC: 140 MMOL/L (ref 136–146)
WBC # BLD AUTO: 16.4 K/UL (ref 4.3–11.1)

## 2023-12-11 PROCEDURE — G8417 CALC BMI ABV UP PARAM F/U: HCPCS | Performed by: UROLOGY

## 2023-12-11 PROCEDURE — 4004F PT TOBACCO SCREEN RCVD TLK: CPT | Performed by: UROLOGY

## 2023-12-11 PROCEDURE — G8427 DOCREV CUR MEDS BY ELIG CLIN: HCPCS | Performed by: UROLOGY

## 2023-12-11 PROCEDURE — 3017F COLORECTAL CA SCREEN DOC REV: CPT | Performed by: UROLOGY

## 2023-12-11 PROCEDURE — G8484 FLU IMMUNIZE NO ADMIN: HCPCS | Performed by: UROLOGY

## 2023-12-11 PROCEDURE — 99214 OFFICE O/P EST MOD 30 MIN: CPT | Performed by: UROLOGY

## 2023-12-11 PROCEDURE — 1123F ACP DISCUSS/DSCN MKR DOCD: CPT | Performed by: UROLOGY

## 2023-12-11 RX ORDER — SILDENAFIL CITRATE 20 MG/1
20-100 TABLET ORAL PRN
Qty: 50 TABLET | Refills: 6 | Status: SHIPPED | OUTPATIENT
Start: 2023-12-11

## 2023-12-11 ASSESSMENT — ENCOUNTER SYMPTOMS
EYE PAIN: 0
CONSTIPATION: 0
SHORTNESS OF BREATH: 0
VOMITING: 0
BLOOD IN STOOL: 0
WHEEZING: 0
HEARTBURN: 0
DIARRHEA: 0
COUGH: 0
EYE DISCHARGE: 0
ABDOMINAL PAIN: 0
INDIGESTION: 0
BACK PAIN: 0
SKIN LESIONS: 0
NAUSEA: 0

## 2023-12-11 NOTE — RESULT ENCOUNTER NOTE
CBC shows normal hemoglobin / hematocrit.  WBC slightly elevated.  Will await results of other labs before deciding on follow up plan, final TRT dose adjustment, if indicated.     Juancarlos Floyd M.D.    [facility]  [place] [id number]  Phone: [phone]  Fax: [phone]

## 2023-12-12 NOTE — RESULT ENCOUNTER NOTE
LFTs, PSA, CMP, CBC look okay.  Waiting on testosterone results.  Will call with those once available.     Juancarlos Floyd M.D.    [facility]  [address] [address]  Phone: [phone]  Fax: [phone]

## 2023-12-13 DIAGNOSIS — N40.1 BENIGN PROSTATIC HYPERPLASIA WITH URINARY FREQUENCY: ICD-10-CM

## 2023-12-13 DIAGNOSIS — E29.1 HYPOGONADISM IN MALE: Primary | ICD-10-CM

## 2023-12-13 DIAGNOSIS — R35.0 BENIGN PROSTATIC HYPERPLASIA WITH URINARY FREQUENCY: ICD-10-CM

## 2023-12-13 LAB — TESTOST SERPL-MCNC: 223 NG/DL (ref 264–916)

## 2023-12-13 RX ORDER — TESTOSTERONE CYPIONATE 200 MG/ML
200 INJECTION, SOLUTION INTRAMUSCULAR
Qty: 10 EACH | Refills: 5 | Status: SHIPPED | OUTPATIENT
Start: 2023-12-13 | End: 2024-12-12

## 2023-12-13 NOTE — RESULT ENCOUNTER NOTE
Myranda,     Please let Mr. Castillo know that his testosterone labs overall look good.  His testosterone level iva from undetectable to 223 on his current regimen.  While it is still slightly below the 300 normal level, I would recommend that he continue his current dose if he feels that it is helping him since it has a lower risk of blood clots than a higher dose.      I would like to see him back in 6 months with repeat testosterone labs prior to appointment.  I ordered labs today.  Please schedule this.     Thanks!  Everett.

## 2023-12-14 LAB — TESTOST FREE SERPL-MCNC: 0.9 PG/ML (ref 6.6–18.1)

## 2023-12-18 RX ORDER — TRAMADOL HYDROCHLORIDE 50 MG/1
TABLET ORAL
Qty: 30 TABLET | Refills: 1 | OUTPATIENT
Start: 2023-12-18

## 2023-12-22 ENCOUNTER — TELEPHONE (OUTPATIENT)
Dept: UROLOGY | Age: 68
End: 2023-12-22

## 2024-01-08 ENCOUNTER — TELEPHONE (OUTPATIENT)
Dept: ORTHOPEDIC SURGERY | Age: 69
End: 2024-01-08

## 2024-01-08 ENCOUNTER — OFFICE VISIT (OUTPATIENT)
Dept: ORTHOPEDIC SURGERY | Age: 69
End: 2024-01-08
Payer: MEDICARE

## 2024-01-08 VITALS — BODY MASS INDEX: 32.33 KG/M2 | WEIGHT: 206 LBS | HEIGHT: 67 IN

## 2024-01-08 DIAGNOSIS — M60.9 MYOSITIS, UNSPECIFIED MYOSITIS TYPE, UNSPECIFIED SITE: ICD-10-CM

## 2024-01-08 DIAGNOSIS — M54.50 LUMBAR BACK PAIN: Primary | ICD-10-CM

## 2024-01-08 DIAGNOSIS — M47.816 SPONDYLOSIS OF LUMBAR REGION WITHOUT MYELOPATHY OR RADICULOPATHY: ICD-10-CM

## 2024-01-08 PROCEDURE — 1123F ACP DISCUSS/DSCN MKR DOCD: CPT | Performed by: PHYSICAL MEDICINE & REHABILITATION

## 2024-01-08 PROCEDURE — 20552 NJX 1/MLT TRIGGER POINT 1/2: CPT | Performed by: PHYSICAL MEDICINE & REHABILITATION

## 2024-01-08 PROCEDURE — G8417 CALC BMI ABV UP PARAM F/U: HCPCS | Performed by: PHYSICAL MEDICINE & REHABILITATION

## 2024-01-08 PROCEDURE — 3017F COLORECTAL CA SCREEN DOC REV: CPT | Performed by: PHYSICAL MEDICINE & REHABILITATION

## 2024-01-08 PROCEDURE — G8484 FLU IMMUNIZE NO ADMIN: HCPCS | Performed by: PHYSICAL MEDICINE & REHABILITATION

## 2024-01-08 PROCEDURE — G8428 CUR MEDS NOT DOCUMENT: HCPCS | Performed by: PHYSICAL MEDICINE & REHABILITATION

## 2024-01-08 PROCEDURE — 4004F PT TOBACCO SCREEN RCVD TLK: CPT | Performed by: PHYSICAL MEDICINE & REHABILITATION

## 2024-01-08 PROCEDURE — 99212 OFFICE O/P EST SF 10 MIN: CPT | Performed by: PHYSICAL MEDICINE & REHABILITATION

## 2024-01-08 RX ORDER — TRIAMCINOLONE ACETONIDE 40 MG/ML
120 INJECTION, SUSPENSION INTRA-ARTICULAR; INTRAMUSCULAR ONCE
Status: COMPLETED | OUTPATIENT
Start: 2024-01-08 | End: 2024-01-08

## 2024-01-08 RX ORDER — TRAMADOL HYDROCHLORIDE 50 MG/1
50 TABLET ORAL 2 TIMES DAILY PRN
Qty: 60 TABLET | Refills: 0 | Status: SHIPPED | OUTPATIENT
Start: 2024-01-08 | End: 2024-02-07

## 2024-01-08 RX ADMIN — TRIAMCINOLONE ACETONIDE 120 MG: 40 INJECTION, SUSPENSION INTRA-ARTICULAR; INTRAMUSCULAR at 10:22

## 2024-01-08 NOTE — PROGRESS NOTES
Date:  01/08/24      Diagnosis Orders   1. Lumbar back pain  triamcinolone acetonide (KENALOG-40) injection 120 mg    INJECT TRIGGER POINT, 1 OR 2      2. Myositis, unspecified myositis type, unspecified site        3. Spondylosis of lumbar region without myelopathy or radiculopathy            HPI:  I am seeing Ace Castillo in evalution/folowup.  I saw him most recently 6 weeks ago.  In the past he has had pain in the lower lumbar paraspinal areas for which she has had very nice responses to Non-fluoroscopically guided trigger point injections.  He has had issues over a year ago with DVT/PE and is on anticoagulant therapy.  The last set of injections were performed about 6 weeks ago and they have helped the pain on the right but he is having persistent difficulties with pain on the left.  His left lower lumbar paraspinal area the gravitates to the buttock.  No further radiating symptoms.    Of note he is having some issues with his left knee, believe which she has seen an orthopedist.    ROS: As above    PE: Alert and cooperative.  Reasonably good strength lower extremities.  He has tenderness in the left lower lumbar paraspinal area.  Good range of motion in the hips.    Assessment/Plan:     PREDOMINANTLY MUSCULOSKELETAL LUMBOSACRAL SPINE PAIN present with facet joint arthropathy.  Has had favorable responses to Non-fluoroscopically guided trigger point injections, would repeat just on the left today.  If no benefit consider further imaging or fluoroscopically guided injections.    2.  Orthopedic.  Per his orthopedist.    Trigger Point Injection(s):  After informed oral consent, patient was prepped per routine. The left lumbar paraspinal area (2 sites) were injected. 60 mg of Kenalog with 1 cc of 0.25% Marcaine injected at each site. Patient tolerated well. Band aid(s) applied.      A total of two muscles/sites injected today for trigger point charge.    A total of 3 cc of Kenalog were administered during this

## 2024-01-19 ENCOUNTER — NURSE ONLY (OUTPATIENT)
Dept: UROLOGY | Age: 69
End: 2024-01-19
Payer: MEDICARE

## 2024-01-19 DIAGNOSIS — E29.1 HYPOGONADISM IN MALE: Primary | ICD-10-CM

## 2024-01-19 PROCEDURE — 96372 THER/PROPH/DIAG INJ SC/IM: CPT | Performed by: UROLOGY

## 2024-01-19 RX ORDER — TESTOSTERONE CYPIONATE 200 MG/ML
100 INJECTION, SOLUTION INTRAMUSCULAR ONCE
Status: COMPLETED | OUTPATIENT
Start: 2024-01-19 | End: 2024-01-19

## 2024-01-19 RX ADMIN — TESTOSTERONE CYPIONATE 100 MG: 200 INJECTION, SOLUTION INTRAMUSCULAR at 12:00

## 2024-01-19 NOTE — PROGRESS NOTES
Per 's lab result note from 12/13/2023, patient due to receive 100mg testosterone injection every 3 weeks.   Patient received injection IM LUOQ today without incident.   Testosterone injections authorized  in schedule note.

## 2024-02-09 ENCOUNTER — NURSE ONLY (OUTPATIENT)
Dept: UROLOGY | Age: 69
End: 2024-02-09
Payer: MEDICARE

## 2024-02-09 DIAGNOSIS — E29.1 HYPOGONADISM IN MALE: Primary | ICD-10-CM

## 2024-02-09 PROCEDURE — 96372 THER/PROPH/DIAG INJ SC/IM: CPT | Performed by: UROLOGY

## 2024-02-09 RX ORDER — TESTOSTERONE CYPIONATE 200 MG/ML
100 INJECTION, SOLUTION INTRAMUSCULAR ONCE
Status: COMPLETED | OUTPATIENT
Start: 2024-02-09 | End: 2024-02-09

## 2024-02-09 RX ADMIN — TESTOSTERONE CYPIONATE 100 MG: 200 INJECTION, SOLUTION INTRAMUSCULAR at 15:53

## 2024-02-09 NOTE — PROGRESS NOTES
Per 's lab result note from 12/13/2023, patient due to receive 100mg testosterone injection every 3 weeks.   Patient received injection IM LUOQ today without incident.   Testosterone brought to office from pharmacy by patient .

## 2024-03-01 ENCOUNTER — NURSE ONLY (OUTPATIENT)
Dept: UROLOGY | Age: 69
End: 2024-03-01
Payer: MEDICARE

## 2024-03-01 DIAGNOSIS — E29.1 HYPOGONADISM IN MALE: Primary | ICD-10-CM

## 2024-03-01 PROCEDURE — 96372 THER/PROPH/DIAG INJ SC/IM: CPT | Performed by: UROLOGY

## 2024-03-01 RX ORDER — TESTOSTERONE CYPIONATE 200 MG/ML
100 INJECTION, SOLUTION INTRAMUSCULAR ONCE
Status: COMPLETED | OUTPATIENT
Start: 2024-03-01 | End: 2024-03-01

## 2024-03-01 RX ADMIN — TESTOSTERONE CYPIONATE 100 MG: 200 INJECTION, SOLUTION INTRAMUSCULAR at 11:48

## 2024-03-01 NOTE — PROGRESS NOTES
Per 's lab result note from 12/13/2023, patient due to receive 100mg testosterone injection every 3 weeks.   Patient received injection IM RUOQ today without incident.   Testosterone brought to office from pharmacy by patient .

## 2024-03-08 ENCOUNTER — TELEPHONE (OUTPATIENT)
Dept: ORTHOPEDIC SURGERY | Age: 69
End: 2024-03-08

## 2024-03-08 ENCOUNTER — TELEMEDICINE (OUTPATIENT)
Dept: PULMONOLOGY | Age: 69
End: 2024-03-08
Payer: MEDICARE

## 2024-03-08 DIAGNOSIS — M60.9 MYOSITIS, UNSPECIFIED MYOSITIS TYPE, UNSPECIFIED SITE: Primary | ICD-10-CM

## 2024-03-08 DIAGNOSIS — M47.816 SPONDYLOSIS OF LUMBAR REGION WITHOUT MYELOPATHY OR RADICULOPATHY: ICD-10-CM

## 2024-03-08 DIAGNOSIS — G47.33 OBSTRUCTIVE SLEEP APNEA SYNDROME: ICD-10-CM

## 2024-03-08 DIAGNOSIS — M54.16 LUMBAR RADICULOPATHY: ICD-10-CM

## 2024-03-08 DIAGNOSIS — Z87.891 HISTORY OF CIGARETTE SMOKING: ICD-10-CM

## 2024-03-08 DIAGNOSIS — Z79.01 CURRENT USE OF LONG TERM ANTICOAGULATION: ICD-10-CM

## 2024-03-08 DIAGNOSIS — J45.30 MILD PERSISTENT ASTHMA WITHOUT COMPLICATION: Primary | ICD-10-CM

## 2024-03-08 DIAGNOSIS — M54.50 LUMBAR BACK PAIN: ICD-10-CM

## 2024-03-08 PROBLEM — J96.01 ACUTE RESPIRATORY FAILURE WITH HYPOXIA (HCC): Status: RESOLVED | Noted: 2023-03-08 | Resolved: 2024-03-08

## 2024-03-08 PROCEDURE — G0296 VISIT TO DETERM LDCT ELIG: HCPCS | Performed by: INTERNAL MEDICINE

## 2024-03-08 PROCEDURE — G8427 DOCREV CUR MEDS BY ELIG CLIN: HCPCS | Performed by: INTERNAL MEDICINE

## 2024-03-08 PROCEDURE — 1123F ACP DISCUSS/DSCN MKR DOCD: CPT | Performed by: INTERNAL MEDICINE

## 2024-03-08 PROCEDURE — 99214 OFFICE O/P EST MOD 30 MIN: CPT | Performed by: INTERNAL MEDICINE

## 2024-03-08 PROCEDURE — 3017F COLORECTAL CA SCREEN DOC REV: CPT | Performed by: INTERNAL MEDICINE

## 2024-03-08 PROCEDURE — 99406 BEHAV CHNG SMOKING 3-10 MIN: CPT | Performed by: INTERNAL MEDICINE

## 2024-03-08 RX ORDER — FLUTICASONE PROPIONATE AND SALMETEROL XINAFOATE 230; 21 UG/1; UG/1
2 AEROSOL, METERED RESPIRATORY (INHALATION) DAILY
Qty: 3 EACH | Refills: 3 | Status: SHIPPED | OUTPATIENT
Start: 2024-03-08

## 2024-03-08 RX ORDER — ALBUTEROL SULFATE 90 UG/1
AEROSOL, METERED RESPIRATORY (INHALATION)
Qty: 3 EACH | Refills: 3 | Status: SHIPPED | OUTPATIENT
Start: 2024-03-08

## 2024-03-08 NOTE — PATIENT INSTRUCTIONS
We understand that insurance companies have different 'preferred' medications. These preferences can change yearly and can be difficult to track. Depending upon your insurance and their preferred medicines, some medications we prescribe may be too expensive. If this happens, we recommend that you find out what inhalers for COPD or asthma are \"preferred\" on your insurance drug formulary by calling the member services phone number on the back of the insurance card.  The cost of your medication can be dramatically different depending on this.      Once the preferred inhalers are known, please send us a message in [place] or call us back at [phone] with this information.  We will then choose the best option available for you and send that prescription to your pharmacy on file.    ICS Inhalers:  Fluticasone inhaler (Flovent, Arnuity)  Qvar  Pulmicort  Asmanex  Alvesco    ICS/LABA Inhalers:  Fluticasone/Salmeterol inhaler (Advair, Airduo, Wixela)  Symbicort  Dulera  Breo

## 2024-03-08 NOTE — PROGRESS NOTES
Name:  Ace Castillo  YOB: 1955   MRN: [id number]      Office Visit: 3/8/2024        The patient is seen by synchronous (real-time) audio-video technology on [employer].    Consent:  The patient/healthcare decision maker is aware that this patient-initiated Telehealth encounter is a billable service, with coverage as determined by his insurance carrier. The patient is aware that he/she may receive a bill and has provided verbal consent to proceed: Yes     I was at [facility] while conducting this visit.    ASSESSMENT AND PLAN:  (Medical Decision Making)    Impression: 68 y.o. male with recurrent VTE episodes of chronic Eliquis, mild persistent asthma on albuterol alone, sleep apnea which she has not followed up with CPAP initiation and ongoing intermittent smoking.    1. Mild persistent asthma without complication  Currently only rarely using albuterol.  Did not like the Breo as it caused irritation with the dry powder.  I will try to switch him over to Advair HFA and he can even use daily and follow-up in 4 months with spirometry.  - albuterol sulfate HFA (PROVENTIL;VENTOLIN;PROAIR) 108 (90 Base) MCG/ACT inhaler; 2 puffs 4 times daily if needed for shortness of breath or wheezing.  Patient will call when refills are needed  Dispense: 3 each; Refill: 3  - fluticasone-salmeterol (ADVAIR HFA) 230-21 MCG/ACT inhaler; Inhale 2 puffs into the lungs daily  Dispense: 3 each; Refill: 3    2. Obstructive sleep apnea syndrome  Still not using CPAP.  Encouraged him to follow-up with Valerie in the sleep clinic.    3. Current use of long term anticoagulation  Continues to use Eliquis.  Long-term anticoagulation plan with prior PEs.    4. History of cigarette smoking  Total smoking cessation is advised.  Discussed various smoking cessation products including pills, patches, inhaler, gum, e-cigarettes, weaning self off, \"cold turkey\", and smoking cessation classes.  Discussed also with patient disease

## 2024-03-08 NOTE — TELEPHONE ENCOUNTER
Called patient offered  bilateral facets and if fails will get new MRI- precert sent and will call patient to schedule

## 2024-03-11 DIAGNOSIS — M47.816 SPONDYLOSIS OF LUMBAR REGION WITHOUT MYELOPATHY OR RADICULOPATHY: Primary | ICD-10-CM

## 2024-03-11 DIAGNOSIS — M54.50 LUMBAR BACK PAIN: ICD-10-CM

## 2024-03-11 DIAGNOSIS — M60.9 MYOSITIS, UNSPECIFIED MYOSITIS TYPE, UNSPECIFIED SITE: ICD-10-CM

## 2024-03-11 DIAGNOSIS — M54.16 LUMBAR RADICULOPATHY: ICD-10-CM

## 2024-03-12 ENCOUNTER — TELEPHONE (OUTPATIENT)
Dept: ORTHOPEDIC SURGERY | Age: 69
End: 2024-03-12

## 2024-03-12 NOTE — TELEPHONE ENCOUNTER
Called patient left voicemail to call back to schedule appt in spine injection slot    Appt note as follow :  Bilateral 2 level facet  AUTH#[id number]  VALID 03/14/2024 - 0517/2024

## 2024-03-15 ENCOUNTER — OFFICE VISIT (OUTPATIENT)
Dept: ORTHOPEDIC SURGERY | Age: 69
End: 2024-03-15

## 2024-03-15 VITALS — WEIGHT: 206 LBS | HEIGHT: 67 IN | BODY MASS INDEX: 32.33 KG/M2

## 2024-03-15 DIAGNOSIS — M47.816 SPONDYLOSIS OF LUMBAR REGION WITHOUT MYELOPATHY OR RADICULOPATHY: Primary | ICD-10-CM

## 2024-03-15 RX ORDER — TRIAMCINOLONE ACETONIDE 40 MG/ML
200 INJECTION, SUSPENSION INTRA-ARTICULAR; INTRAMUSCULAR ONCE
Status: COMPLETED | OUTPATIENT
Start: 2024-03-15 | End: 2024-03-15

## 2024-03-15 RX ADMIN — TRIAMCINOLONE ACETONIDE 200 MG: 40 INJECTION, SUSPENSION INTRA-ARTICULAR; INTRAMUSCULAR at 11:11

## 2024-03-15 NOTE — PROGRESS NOTES
Date: 03/15/24   Name: Ace Castillo    Pre-Procedural Diagnosis:    Diagnosis Orders   1. Spondylosis of lumbar region without myelopathy or radiculopathy  triamcinolone acetonide (KENALOG-40) injection 200 mg    FL INJ LUMB/SAC FACET SINGLE LEVEL    XR INJ FACET LUMB SACRAL 2ND LVL          Procedure: Bilateral Facet Joint Injections (2 levels)    Precautions:  [facility] Precautions spine injections: None.  Patient denies any prior sensitivity to steroid, local anesthetic, contrast dye, iodine or shellfish.    The procedure was discussed at length with the patient and informed consent was signed. The patient was placed in a prone position on the fluoroscopy table and the skin was prepped and draped in a routine sterile fashion. The areas to be injected were each anesthetized with approximately 2-3 cc of 1% Lidocaine. Initially a 22-gauge 3.5 inch spinal needle was carefully advanced under fluoroscopic guidance to the bilateral L4-L5 and L5-S1 facet joint spaces. 1 cc of 0.25% Marcaine and 50 mg of Kenalog were injected through the spinal needle at each site.     Fluoroscopic guidance was used intermittently over a 10-minute period to insure proper needle placement and patient safety. A hard copy of the fluoroscopic  images has been placed in the patient's chart. The patient was monitored after the procedure and discharged home without complication.     A total of 5 cc of Kenalog were administered during this procedure.    Resume Meds:     N/A Remains on Eliquis.    L GAVI THAKUR JR, MD  03/15/24

## 2024-03-25 ENCOUNTER — TELEPHONE (OUTPATIENT)
Dept: UROLOGY | Age: 69
End: 2024-03-25

## 2024-03-25 NOTE — TELEPHONE ENCOUNTER
Missed his appt for injection last week so rescheduled for 3/29 @ 916 at [facility]

## 2024-03-29 ENCOUNTER — NURSE ONLY (OUTPATIENT)
Dept: UROLOGY | Age: 69
End: 2024-03-29
Payer: MEDICARE

## 2024-03-29 ENCOUNTER — TELEPHONE (OUTPATIENT)
Dept: UROLOGY | Age: 69
End: 2024-03-29

## 2024-03-29 DIAGNOSIS — E29.1 HYPOGONADISM IN MALE: Primary | ICD-10-CM

## 2024-03-29 PROCEDURE — 96372 THER/PROPH/DIAG INJ SC/IM: CPT | Performed by: UROLOGY

## 2024-03-29 RX ORDER — TESTOSTERONE CYPIONATE 200 MG/ML
100 INJECTION, SOLUTION INTRAMUSCULAR ONCE
Status: COMPLETED | OUTPATIENT
Start: 2024-03-29 | End: 2024-03-29

## 2024-03-29 RX ADMIN — TESTOSTERONE CYPIONATE 100 MG: 200 INJECTION, SOLUTION INTRAMUSCULAR at 09:58

## 2024-03-29 NOTE — TELEPHONE ENCOUNTER
Patient came in to office today, states he is not doing well on this dose of testosterone. He has low energy, low libido, fatigued, and generally just weak. He is not happy.    Please advise.

## 2024-04-19 ENCOUNTER — NURSE ONLY (OUTPATIENT)
Dept: UROLOGY | Age: 69
End: 2024-04-19
Payer: MEDICARE

## 2024-04-19 DIAGNOSIS — E29.1 HYPOGONADISM IN MALE: Primary | ICD-10-CM

## 2024-04-19 PROCEDURE — 96372 THER/PROPH/DIAG INJ SC/IM: CPT | Performed by: UROLOGY

## 2024-04-19 RX ORDER — TESTOSTERONE CYPIONATE 200 MG/ML
100 INJECTION, SOLUTION INTRAMUSCULAR ONCE
Status: COMPLETED | OUTPATIENT
Start: 2024-04-19 | End: 2024-04-19

## 2024-04-19 RX ADMIN — TESTOSTERONE CYPIONATE 100 MG: 200 INJECTION, SOLUTION INTRAMUSCULAR at 10:47

## 2024-04-19 NOTE — PROGRESS NOTES
Per 's lab result note from 12/13/2023, patient due to receive 100mg testosterone injection every 3 weeks.   Patient received injection IM RUOQ today without incident.   Testosterone brought to office from pharmacy by patient.

## 2024-04-30 ENCOUNTER — TELEPHONE (OUTPATIENT)
Dept: FAMILY MEDICINE CLINIC | Facility: CLINIC | Age: 69
End: 2024-04-30

## 2024-05-06 ENCOUNTER — OFFICE VISIT (OUTPATIENT)
Dept: FAMILY MEDICINE CLINIC | Facility: CLINIC | Age: 69
End: 2024-05-06

## 2024-05-06 VITALS
DIASTOLIC BLOOD PRESSURE: 76 MMHG | OXYGEN SATURATION: 96 % | HEIGHT: 67 IN | WEIGHT: 194.8 LBS | SYSTOLIC BLOOD PRESSURE: 134 MMHG | BODY MASS INDEX: 30.57 KG/M2 | HEART RATE: 116 BPM

## 2024-05-06 DIAGNOSIS — R63.4 UNINTENDED WEIGHT LOSS: ICD-10-CM

## 2024-05-06 DIAGNOSIS — M15.9 PRIMARY OSTEOARTHRITIS INVOLVING MULTIPLE JOINTS: ICD-10-CM

## 2024-05-06 DIAGNOSIS — E78.2 MIXED HYPERLIPIDEMIA: ICD-10-CM

## 2024-05-06 DIAGNOSIS — J30.1 SEASONAL ALLERGIC RHINITIS DUE TO POLLEN: ICD-10-CM

## 2024-05-06 DIAGNOSIS — I10 PRIMARY HYPERTENSION: Primary | ICD-10-CM

## 2024-05-06 DIAGNOSIS — Z87.891 PERSONAL HISTORY OF TOBACCO USE: ICD-10-CM

## 2024-05-06 DIAGNOSIS — Z91.81 AT HIGH RISK FOR FALLS: ICD-10-CM

## 2024-05-06 PROBLEM — I26.99 ACUTE PULMONARY EMBOLISM, UNSPECIFIED PULMONARY EMBOLISM TYPE, UNSPECIFIED WHETHER ACUTE COR PULMONALE PRESENT (HCC): Status: RESOLVED | Noted: 2022-11-07 | Resolved: 2024-05-06

## 2024-05-06 PROBLEM — I26.99 PULMONARY EMBOLUS (HCC): Status: RESOLVED | Noted: 2023-03-09 | Resolved: 2024-05-06

## 2024-05-06 PROBLEM — I26.99 BILATERAL PULMONARY EMBOLISM (HCC): Status: RESOLVED | Noted: 2023-03-08 | Resolved: 2024-05-06

## 2024-05-06 RX ORDER — DEXAMETHASONE SODIUM PHOSPHATE 4 MG/ML
8 INJECTION, SOLUTION INTRA-ARTICULAR; INTRALESIONAL; INTRAMUSCULAR; INTRAVENOUS; SOFT TISSUE ONCE
Status: COMPLETED | OUTPATIENT
Start: 2024-05-06 | End: 2024-05-06

## 2024-05-06 RX ORDER — CARVEDILOL 6.25 MG/1
6.25 TABLET ORAL 2 TIMES DAILY
Qty: 180 TABLET | Refills: 3 | Status: SHIPPED | OUTPATIENT
Start: 2024-05-06 | End: 2025-05-06

## 2024-05-06 RX ORDER — ATORVASTATIN CALCIUM 80 MG/1
80 TABLET, FILM COATED ORAL DAILY
Qty: 90 TABLET | Refills: 3 | Status: SHIPPED | OUTPATIENT
Start: 2024-05-06 | End: 2025-05-06

## 2024-05-06 RX ADMIN — DEXAMETHASONE SODIUM PHOSPHATE 8 MG: 4 INJECTION, SOLUTION INTRA-ARTICULAR; INTRALESIONAL; INTRAMUSCULAR; INTRAVENOUS; SOFT TISSUE at 15:04

## 2024-05-06 SDOH — ECONOMIC STABILITY: FOOD INSECURITY: WITHIN THE PAST 12 MONTHS, YOU WORRIED THAT YOUR FOOD WOULD RUN OUT BEFORE YOU GOT MONEY TO BUY MORE.: NEVER TRUE

## 2024-05-06 SDOH — ECONOMIC STABILITY: FOOD INSECURITY: WITHIN THE PAST 12 MONTHS, THE FOOD YOU BOUGHT JUST DIDN'T LAST AND YOU DIDN'T HAVE MONEY TO GET MORE.: NEVER TRUE

## 2024-05-06 SDOH — ECONOMIC STABILITY: INCOME INSECURITY: HOW HARD IS IT FOR YOU TO PAY FOR THE VERY BASICS LIKE FOOD, HOUSING, MEDICAL CARE, AND HEATING?: NOT HARD AT ALL

## 2024-05-06 ASSESSMENT — PATIENT HEALTH QUESTIONNAIRE - PHQ9
1. LITTLE INTEREST OR PLEASURE IN DOING THINGS: NOT AT ALL
SUM OF ALL RESPONSES TO PHQ QUESTIONS 1-9: 0
SUM OF ALL RESPONSES TO PHQ QUESTIONS 1-9: 0
SUM OF ALL RESPONSES TO PHQ9 QUESTIONS 1 & 2: 0
2. FEELING DOWN, DEPRESSED OR HOPELESS: NOT AT ALL
SUM OF ALL RESPONSES TO PHQ QUESTIONS 1-9: 0
SUM OF ALL RESPONSES TO PHQ QUESTIONS 1-9: 0

## 2024-05-06 ASSESSMENT — ENCOUNTER SYMPTOMS: COUGH: 0

## 2024-05-06 NOTE — PROGRESS NOTES
PROGRESS NOTE    SUBJECTIVE:   Ace Castillo is a 69 y.o. male seen for a follow up visit regarding   Chief Complaint   Patient presents with    Hypertension     Follow up and medication refills    Knee Pain     Complains of left knee pain and lower back pain x years; gets injections in both knee and back with some relief.     Congestion     Complains of congestion, sinus issues and loss of voice x 3 weeks; voice is starting to come back        HPI:  Here today for follow-up of chronic problems.  He still continues to deal with some fatigue and thinks this may be due to him not getting enough testosterone.  He has been getting this through his urologist, very low-dose, 100 mg every 21 days.  There has been some concerns in the past over resuming his testosterone supplementation due to previous thromboembolic phenomena, including pulmonary emboli.  The risks involved and resuming testosterone were discussed with him by his hematologist and urologist and myself and he is going back on testosterone replacement understanding that he must continue taking Eliquis.  Today patient also reports that he has lost some weight, unintentional, no change in his diet.  He denies diarrhea, change in his stool consistency.  He continues to smoke, has seen pulmonary medicine who has recommended LDCT however patient did not follow through with this.  He also reports having some persistent postnasal drip along with some sneezing and changes voice for the last month or so.  He has a history of seasonal allergic rhinitis.  Reports ongoing arthritis pain in his weightbearing joints.         Reviewed and updated this visit by provider:           Review of Systems   Respiratory:  Negative for cough.         Chronic GUERRERO   Cardiovascular: Negative.           OBJECTIVE:  Vitals:    05/06/24 1420   BP: 134/76   Site: Left Upper Arm   Cuff Size: Medium Adult   Pulse: (!) 116   SpO2: 96%   Weight: 88.4 kg (194 lb 12.8 oz)   Height: 1.702 m (5'

## 2024-05-17 ENCOUNTER — HOSPITAL ENCOUNTER (OUTPATIENT)
Dept: CT IMAGING | Age: 69
End: 2024-05-17
Attending: FAMILY MEDICINE
Payer: COMMERCIAL

## 2024-05-17 DIAGNOSIS — Z87.891 PERSONAL HISTORY OF TOBACCO USE: ICD-10-CM

## 2024-05-17 DIAGNOSIS — R63.4 UNINTENDED WEIGHT LOSS: ICD-10-CM

## 2024-05-17 PROCEDURE — 71271 CT THORAX LUNG CANCER SCR C-: CPT

## 2024-05-17 PROCEDURE — 74176 CT ABD & PELVIS W/O CONTRAST: CPT

## 2024-05-17 PROCEDURE — 74176 CT ABD & PELVIS W/O CONTRAST: CPT | Performed by: RADIOLOGY

## 2024-05-21 ENCOUNTER — TELEPHONE (OUTPATIENT)
Dept: PULMONOLOGY | Age: 69
End: 2024-05-21

## 2024-05-21 DIAGNOSIS — R91.1 PULMONARY NODULE: Primary | ICD-10-CM

## 2024-05-21 NOTE — TELEPHONE ENCOUNTER
Spoke with the patient in regards to their CT scan results, explained per Dr. Long that the CT looks overall good and there are a couple of small nodules that he would like to keep an eye on with a repeat CT scan in 6 months to assure stability.  Patient verbalized understanding and was agreeable with the repeat CT scan.  Order has been established and no further questions or concerns were asked at this time.  // Jeanette Gilbert M.A.

## 2024-05-21 NOTE — TELEPHONE ENCOUNTER
----- Message from Tiffanie Long MD sent at 5/21/2024  9:27 AM EDT -----  CT looks overall good. There are a couple small nodules. We should repeat in 6 months to be sure the nodules are stable.   ----- Message -----  From: Adan Squires Incoming Orders Results To [facility]  Sent: 5/19/2024  10:25 AM EDT  To: Tiffanie Long MD

## 2024-05-28 ENCOUNTER — TELEPHONE (OUTPATIENT)
Dept: ORTHOPEDIC SURGERY | Age: 69
End: 2024-05-28

## 2024-05-31 DIAGNOSIS — R91.8 ABNORMAL CT LUNG SCREENING: Primary | ICD-10-CM

## 2024-05-31 DIAGNOSIS — R91.8 PULMONARY NODULES: ICD-10-CM

## 2024-06-03 ENCOUNTER — TELEPHONE (OUTPATIENT)
Dept: ORTHOPEDIC SURGERY | Age: 69
End: 2024-06-03

## 2024-06-03 DIAGNOSIS — M54.16 LUMBAR RADICULOPATHY: Primary | ICD-10-CM

## 2024-06-03 RX ORDER — TRAMADOL HYDROCHLORIDE 50 MG/1
50 TABLET ORAL 2 TIMES DAILY PRN
Qty: 60 TABLET | Refills: 0 | Status: SHIPPED | OUTPATIENT
Start: 2024-06-03 | End: 2024-07-03

## 2024-06-03 NOTE — TELEPHONE ENCOUNTER
Patient would like to discuss options with [facility]    Scheduled for OV 6/6 10:00am

## 2024-06-07 ENCOUNTER — NURSE ONLY (OUTPATIENT)
Dept: UROLOGY | Age: 69
End: 2024-06-07

## 2024-06-07 DIAGNOSIS — N40.1 BENIGN PROSTATIC HYPERPLASIA WITH URINARY FREQUENCY: ICD-10-CM

## 2024-06-07 DIAGNOSIS — R35.0 BENIGN PROSTATIC HYPERPLASIA WITH URINARY FREQUENCY: ICD-10-CM

## 2024-06-07 DIAGNOSIS — E29.1 HYPOGONADISM IN MALE: ICD-10-CM

## 2024-06-07 LAB
ALBUMIN SERPL-MCNC: 3.4 G/DL (ref 3.2–4.6)
ALBUMIN/GLOB SERPL: 1 (ref 1–1.9)
ALP SERPL-CCNC: 63 U/L (ref 40–129)
ALT SERPL-CCNC: 13 U/L (ref 12–65)
AST SERPL-CCNC: 32 U/L (ref 15–37)
BILIRUB DIRECT SERPL-MCNC: <0.2 MG/DL (ref 0–0.4)
BILIRUB SERPL-MCNC: 0.4 MG/DL (ref 0–1.2)
ERYTHROCYTE [DISTWIDTH] IN BLOOD BY AUTOMATED COUNT: 14.6 % (ref 11.9–14.6)
GLOBULIN SER CALC-MCNC: 3.5 G/DL (ref 2.3–3.5)
HCT VFR BLD AUTO: 46.4 % (ref 41.1–50.3)
HGB BLD-MCNC: 15.1 G/DL (ref 13.6–17.2)
MCH RBC QN AUTO: 31 PG (ref 26.1–32.9)
MCHC RBC AUTO-ENTMCNC: 32.5 G/DL (ref 31.4–35)
MCV RBC AUTO: 95.3 FL (ref 82–102)
NRBC # BLD: 0 K/UL (ref 0–0.2)
PLATELET # BLD AUTO: 260 K/UL (ref 150–450)
PMV BLD AUTO: 10.7 FL (ref 9.4–12.3)
PROT SERPL-MCNC: 6.9 G/DL (ref 6.3–8.2)
PSA SERPL-MCNC: 1.5 NG/ML (ref 0–4)
RBC # BLD AUTO: 4.87 M/UL (ref 4.23–5.6)
WBC # BLD AUTO: 10.7 K/UL (ref 4.3–11.1)

## 2024-06-09 LAB — TESTOST SERPL-MCNC: <3 NG/DL (ref 264–916)

## 2024-06-12 LAB — TESTOST FREE SERPL-MCNC: <0.2 PG/ML (ref 6.6–18.1)

## 2024-06-14 ENCOUNTER — OFFICE VISIT (OUTPATIENT)
Dept: UROLOGY | Age: 69
End: 2024-06-14
Payer: COMMERCIAL

## 2024-06-14 DIAGNOSIS — N52.35 ERECTILE DYSFUNCTION FOLLOWING RADIATION THERAPY: ICD-10-CM

## 2024-06-14 DIAGNOSIS — N40.1 BENIGN PROSTATIC HYPERPLASIA WITH URINARY FREQUENCY: ICD-10-CM

## 2024-06-14 DIAGNOSIS — E29.1 HYPOGONADISM IN MALE: Primary | ICD-10-CM

## 2024-06-14 DIAGNOSIS — R35.0 BENIGN PROSTATIC HYPERPLASIA WITH URINARY FREQUENCY: ICD-10-CM

## 2024-06-14 PROCEDURE — 99214 OFFICE O/P EST MOD 30 MIN: CPT | Performed by: UROLOGY

## 2024-06-14 PROCEDURE — 1123F ACP DISCUSS/DSCN MKR DOCD: CPT | Performed by: UROLOGY

## 2024-06-14 RX ORDER — SILDENAFIL CITRATE 20 MG/1
20-100 TABLET ORAL PRN
Qty: 50 TABLET | Refills: 6 | Status: CANCELLED | OUTPATIENT
Start: 2024-06-14

## 2024-06-14 RX ORDER — TESTOSTERONE CYPIONATE 200 MG/ML
200 INJECTION, SOLUTION INTRAMUSCULAR
Qty: 10 EACH | Refills: 5 | Status: SHIPPED | OUTPATIENT
Start: 2024-06-14 | End: 2025-06-14

## 2024-06-14 RX ORDER — TADALAFIL 20 MG/1
20 TABLET ORAL DAILY PRN
Qty: 15 TABLET | Refills: 5 | Status: SHIPPED | OUTPATIENT
Start: 2024-06-14

## 2024-06-14 ASSESSMENT — ENCOUNTER SYMPTOMS
NAUSEA: 0
ABDOMINAL PAIN: 0
DIARRHEA: 0
BACK PAIN: 0
COUGH: 0
WHEEZING: 0
HEARTBURN: 0
EYE DISCHARGE: 0
BLOOD IN STOOL: 0
SKIN LESIONS: 0
SHORTNESS OF BREATH: 0
INDIGESTION: 0
EYE PAIN: 0
CONSTIPATION: 0
VOMITING: 0

## 2024-06-14 NOTE — PROGRESS NOTES
ED.  Sildenafil did not help.     I have spent 30 minutes today reviewing previous notes, test results and face to face with the patient as well as documenting.      Juancarlos Floyd M.D.    [facility]  [place] [id number]  Phone: [phone]  Fax: [phone]

## 2024-06-17 ENCOUNTER — OFFICE VISIT (OUTPATIENT)
Dept: ORTHOPEDIC SURGERY | Age: 69
End: 2024-06-17
Payer: COMMERCIAL

## 2024-06-17 DIAGNOSIS — M46.1 SACROILIITIS (HCC): Primary | ICD-10-CM

## 2024-06-17 DIAGNOSIS — M54.50 LUMBAR BACK PAIN: ICD-10-CM

## 2024-06-17 DIAGNOSIS — M79.10 MYALGIA: ICD-10-CM

## 2024-06-17 PROCEDURE — 1123F ACP DISCUSS/DSCN MKR DOCD: CPT | Performed by: PHYSICAL MEDICINE & REHABILITATION

## 2024-06-17 PROCEDURE — 99213 OFFICE O/P EST LOW 20 MIN: CPT | Performed by: PHYSICAL MEDICINE & REHABILITATION

## 2024-06-17 NOTE — PROGRESS NOTES
Date:  06/17/24     HPI:  I am seeing Ace Castillo in evalution/folowup.  I saw him most recently in January.  He has pain in the lower lumbar paraspinal areas for which he has had nice responses to nonfluoroscopically guided trigger point injections.  Injections from November helped but we tried facet joint injections under fluoroscopy about 3 months ago and he does not feel those are any better.  He still complains of pain in that area but also having symptoms in the left middle and upper buttock area.  Nonradiating pain.  No particular problems in similar fashion on the right.      He takes tramadol once or twice a day with some benefit, no tolerability issues.  He does not need refills today.    He does complain of some falls and balance issues.    ROS: As above he is on anticoagulant therapy for DVT/PE.    PE: Alert and cooperative.  Good strength lower extremities.  Presents with a cane.  Has tenderness in the lower lumbar paraspinal area as well as the left upper buttock area.  This is somewhat near the sacroiliac joint.  Gaenslen's and Jasmyne testing reproduce some of the pain.  Thigh thrust and compression testing was equivocal.    Assessment/Plan:       PREDOMINANTLY MUSCULOSKELETAL LUMBOSACRAL  SPINE PAIN.  He is still having his more midline pain I think is due to facet arthropathy but does mention some pain in the left buttock area.  This could represent sacroiliac joint mediated pain.  Will have him come in for trigger point injections into the lower lumbar areas also consider a left sacroiliac injection.  I do not think we need further imaging at this time we can reevaluate as we go.  Continue tramadol.  When he returns for his injections, we will schedule 6 months appointment for continuity of care with regards to the tramadol.    NOREEN THAKUR JR, MD

## 2024-06-21 ENCOUNTER — NURSE ONLY (OUTPATIENT)
Dept: UROLOGY | Age: 69
End: 2024-06-21

## 2024-06-21 DIAGNOSIS — E29.1 HYPOGONADISM IN MALE: Primary | ICD-10-CM

## 2024-06-21 RX ORDER — TESTOSTERONE CYPIONATE 200 MG/ML
200 INJECTION, SOLUTION INTRAMUSCULAR ONCE
Status: COMPLETED | OUTPATIENT
Start: 2024-06-21 | End: 2024-06-21

## 2024-06-21 RX ADMIN — TESTOSTERONE CYPIONATE 200 MG: 200 INJECTION, SOLUTION INTRAMUSCULAR at 09:44

## 2024-06-21 NOTE — PROGRESS NOTES
Per Dr. Floyd's 6/14/24 office note, Depo  200 mg IM injection given LUOQ. Pt tolerated well. Pt will return in two weeks for next injection. Pt supplied.

## 2024-06-27 ENCOUNTER — OFFICE VISIT (OUTPATIENT)
Dept: ORTHOPEDIC SURGERY | Age: 69
End: 2024-06-27
Payer: COMMERCIAL

## 2024-06-27 DIAGNOSIS — M46.1 SACROILIITIS (HCC): Primary | ICD-10-CM

## 2024-06-27 DIAGNOSIS — M79.10 MYALGIA: ICD-10-CM

## 2024-06-27 DIAGNOSIS — M54.50 LUMBAR BACK PAIN: ICD-10-CM

## 2024-06-27 PROCEDURE — 20552 NJX 1/MLT TRIGGER POINT 1/2: CPT | Performed by: PHYSICAL MEDICINE & REHABILITATION

## 2024-06-27 PROCEDURE — 27096 INJECT SACROILIAC JOINT: CPT | Performed by: PHYSICAL MEDICINE & REHABILITATION

## 2024-06-27 RX ORDER — TRIAMCINOLONE ACETONIDE 40 MG/ML
220 INJECTION, SUSPENSION INTRA-ARTICULAR; INTRAMUSCULAR ONCE
Status: COMPLETED | OUTPATIENT
Start: 2024-06-27 | End: 2024-06-27

## 2024-06-27 RX ADMIN — TRIAMCINOLONE ACETONIDE 220 MG: 40 INJECTION, SUSPENSION INTRA-ARTICULAR; INTRAMUSCULAR at 17:01

## 2024-06-27 NOTE — PROGRESS NOTES
Date: 06/27/24   Name: Ace Castillo    Pre-Procedural Diagnosis:    Diagnosis Orders   1. Sacroiliitis (HCC)  XR INJECTION SI JT W WO ARTHROGRAM      2. Myalgia        3. Lumbar back pain        Reviewed prior lumbar spine radiographs that reveal 5 non rib-bearing lumbar vertebrae.  He may have a partially lumbarized S1 segment.      Procedure: Sacroiliac (SI) Injection with trigger point injections    Precautions: [facility] Precautions spine injections: None.  Patient denies any prior sensitivity to steroid, local anesthetic, contrast dye, iodine or shellfish.    The procedure was discussed at length with the patient and informed consent was signed. The patient was placed in a prone position on the fluoroscopy table and the skin was prepped and draped in a routine sterile fashion. The areas to be injected were each anesthetized with approximately 3 cc of 1% Lidocaine. Initially a 22-gauge 3.5 inch inch spinal needle was carefully advanced under fluoroscopic guidance to the left sacroiliac joint space. At this time 0.25 cc of omnipaque administered.. Once proper placement was confirmed, 1 cc of 0.25% Marcaine and 80 mg of Kenalog were injected through the spinal needle.     After informed oral consent, patient was prepped per routine. The bilateral lower lumbar paraspinal areas were injected. 60 mg of Kenalog with 1 cc of 0.25% Marcaine injected at each site. Patient tolerated well. Band aid(s) applied.      Fluoroscopic guidance was used intermittently over a 10-minute period to insure proper needle placement and patient safety. A hard copy of the fluoroscopic  images has been placed in the patient's chart. The patient was monitored after the procedure and discharged home without complication.     A total of two muscles/sites injected today for trigger point charge.    A total of 2 cc of Kenalog were administered during this procedure.    Resume Meds:  N/A  Remains on Eliquis.    L GAVI THAKUR JR, MD  06/27/24

## 2024-07-05 ENCOUNTER — NURSE ONLY (OUTPATIENT)
Dept: UROLOGY | Age: 69
End: 2024-07-05
Payer: COMMERCIAL

## 2024-07-05 DIAGNOSIS — E29.1 HYPOGONADISM IN MALE: Primary | ICD-10-CM

## 2024-07-05 PROCEDURE — 96372 THER/PROPH/DIAG INJ SC/IM: CPT | Performed by: UROLOGY

## 2024-07-05 RX ORDER — TESTOSTERONE CYPIONATE 200 MG/ML
200 INJECTION, SOLUTION INTRAMUSCULAR ONCE
Status: COMPLETED | OUTPATIENT
Start: 2024-07-05 | End: 2024-07-05

## 2024-07-05 RX ADMIN — TESTOSTERONE CYPIONATE 200 MG: 200 INJECTION, SOLUTION INTRAMUSCULAR at 12:36

## 2024-07-05 NOTE — PROGRESS NOTES
Per 's OV note from 06/14/2024 patient due to receive 200mg testosterone injection every 2 weeks.   Patient received injection IM RUOQ today without incident.   Testosterone brought to office from pharmacy by patient.

## 2024-07-18 NOTE — PROGRESS NOTES
Per 's OV note from 06/14/2024 patient due to receive 200mg testosterone injection every 2 weeks.   Patient received injection IM LUOQ today without incident.   Testosterone brought to office from pharmacy by patient.

## 2024-07-19 ENCOUNTER — NURSE ONLY (OUTPATIENT)
Dept: UROLOGY | Age: 69
End: 2024-07-19

## 2024-07-19 DIAGNOSIS — E29.1 HYPOGONADISM IN MALE: Primary | ICD-10-CM

## 2024-07-19 RX ORDER — TESTOSTERONE CYPIONATE 200 MG/ML
200 INJECTION, SOLUTION INTRAMUSCULAR ONCE
Status: COMPLETED | OUTPATIENT
Start: 2024-07-19 | End: 2024-07-19

## 2024-07-19 RX ADMIN — TESTOSTERONE CYPIONATE 200 MG: 200 INJECTION, SOLUTION INTRAMUSCULAR at 10:26

## 2024-07-29 ENCOUNTER — NURSE ONLY (OUTPATIENT)
Dept: UROLOGY | Age: 69
End: 2024-07-29
Payer: COMMERCIAL

## 2024-07-29 DIAGNOSIS — E29.1 HYPOGONADISM IN MALE: Primary | ICD-10-CM

## 2024-07-29 PROCEDURE — 96372 THER/PROPH/DIAG INJ SC/IM: CPT | Performed by: UROLOGY

## 2024-07-29 RX ORDER — TESTOSTERONE CYPIONATE 200 MG/ML
200 INJECTION, SOLUTION INTRAMUSCULAR ONCE
Status: COMPLETED | OUTPATIENT
Start: 2024-07-29 | End: 2024-07-29

## 2024-07-29 RX ADMIN — TESTOSTERONE CYPIONATE 200 MG: 200 INJECTION, SOLUTION INTRAMUSCULAR at 10:32

## 2024-08-12 ENCOUNTER — NURSE ONLY (OUTPATIENT)
Dept: UROLOGY | Age: 69
End: 2024-08-12
Payer: COMMERCIAL

## 2024-08-12 DIAGNOSIS — E29.1 HYPOGONADISM IN MALE: Primary | ICD-10-CM

## 2024-08-12 PROCEDURE — 96372 THER/PROPH/DIAG INJ SC/IM: CPT | Performed by: UROLOGY

## 2024-08-12 RX ORDER — TESTOSTERONE CYPIONATE 200 MG/ML
200 INJECTION, SOLUTION INTRAMUSCULAR ONCE
Status: COMPLETED | OUTPATIENT
Start: 2024-08-12 | End: 2024-08-12

## 2024-08-12 RX ADMIN — TESTOSTERONE CYPIONATE 200 MG: 200 INJECTION, SOLUTION INTRAMUSCULAR at 12:56

## 2024-08-28 ENCOUNTER — NURSE ONLY (OUTPATIENT)
Dept: UROLOGY | Age: 69
End: 2024-08-28
Payer: COMMERCIAL

## 2024-08-28 DIAGNOSIS — E29.1 HYPOGONADISM IN MALE: Primary | ICD-10-CM

## 2024-08-28 PROCEDURE — 96372 THER/PROPH/DIAG INJ SC/IM: CPT | Performed by: UROLOGY

## 2024-08-28 RX ORDER — TESTOSTERONE CYPIONATE 200 MG/ML
200 INJECTION, SOLUTION INTRAMUSCULAR
Qty: 10 EACH | Refills: 5 | Status: SHIPPED | OUTPATIENT
Start: 2024-08-28 | End: 2025-08-28

## 2024-08-28 RX ORDER — TESTOSTERONE CYPIONATE 200 MG/ML
200 INJECTION, SOLUTION INTRAMUSCULAR ONCE
Status: COMPLETED | OUTPATIENT
Start: 2024-08-28 | End: 2024-08-28

## 2024-08-28 RX ADMIN — TESTOSTERONE CYPIONATE 200 MG: 200 INJECTION, SOLUTION INTRAMUSCULAR at 15:17

## 2024-09-04 ENCOUNTER — LAB (OUTPATIENT)
Dept: UROLOGY | Age: 69
End: 2024-09-04

## 2024-09-04 DIAGNOSIS — N40.1 BENIGN PROSTATIC HYPERPLASIA WITH URINARY FREQUENCY: ICD-10-CM

## 2024-09-04 DIAGNOSIS — E29.1 HYPOGONADISM IN MALE: ICD-10-CM

## 2024-09-04 DIAGNOSIS — R35.0 BENIGN PROSTATIC HYPERPLASIA WITH URINARY FREQUENCY: ICD-10-CM

## 2024-09-04 LAB
ALBUMIN SERPL-MCNC: 3.3 G/DL (ref 3.2–4.6)
ALBUMIN/GLOB SERPL: 0.9 (ref 1–1.9)
ALP SERPL-CCNC: 62 U/L (ref 40–129)
ALT SERPL-CCNC: 9 U/L (ref 12–65)
AST SERPL-CCNC: 27 U/L (ref 15–37)
BILIRUB DIRECT SERPL-MCNC: <0.2 MG/DL (ref 0–0.4)
BILIRUB SERPL-MCNC: 0.5 MG/DL (ref 0–1.2)
ERYTHROCYTE [DISTWIDTH] IN BLOOD BY AUTOMATED COUNT: 15 % (ref 11.9–14.6)
GLOBULIN SER CALC-MCNC: 3.9 G/DL (ref 2.3–3.5)
HCT VFR BLD AUTO: 49.7 % (ref 41.1–50.3)
HGB BLD-MCNC: 14.9 G/DL (ref 13.6–17.2)
MCH RBC QN AUTO: 28.8 PG (ref 26.1–32.9)
MCHC RBC AUTO-ENTMCNC: 30 G/DL (ref 31.4–35)
MCV RBC AUTO: 96.1 FL (ref 82–102)
NRBC # BLD: 0 K/UL (ref 0–0.2)
PLATELET # BLD AUTO: 326 K/UL (ref 150–450)
PMV BLD AUTO: 10.8 FL (ref 9.4–12.3)
PROT SERPL-MCNC: 7.1 G/DL (ref 6.3–8.2)
PSA SERPL-MCNC: 1 NG/ML (ref 0–4)
RBC # BLD AUTO: 5.17 M/UL (ref 4.23–5.6)
WBC # BLD AUTO: 11.3 K/UL (ref 4.3–11.1)

## 2024-09-05 LAB — TESTOST SERPL-MCNC: 1430 NG/DL (ref 264–916)

## 2024-09-06 LAB — TESTOST FREE SERPL-MCNC: 16.1 PG/ML (ref 6.6–18.1)

## 2024-09-13 ENCOUNTER — OFFICE VISIT (OUTPATIENT)
Dept: UROLOGY | Age: 69
End: 2024-09-13
Payer: COMMERCIAL

## 2024-09-13 DIAGNOSIS — N40.0 BENIGN PROSTATIC HYPERPLASIA WITHOUT LOWER URINARY TRACT SYMPTOMS: ICD-10-CM

## 2024-09-13 DIAGNOSIS — E29.1 HYPOGONADISM IN MALE: Primary | ICD-10-CM

## 2024-09-13 DIAGNOSIS — N52.01 ERECTILE DYSFUNCTION DUE TO ARTERIAL INSUFFICIENCY: ICD-10-CM

## 2024-09-13 PROCEDURE — 1123F ACP DISCUSS/DSCN MKR DOCD: CPT | Performed by: UROLOGY

## 2024-09-13 PROCEDURE — 99214 OFFICE O/P EST MOD 30 MIN: CPT | Performed by: UROLOGY

## 2024-09-13 RX ORDER — TESTOSTERONE CYPIONATE 200 MG/ML
200 INJECTION, SOLUTION INTRAMUSCULAR
Qty: 10 EACH | Refills: 5 | Status: SHIPPED | OUTPATIENT
Start: 2024-09-13 | End: 2025-09-13

## 2024-09-13 ASSESSMENT — ENCOUNTER SYMPTOMS
EYE PAIN: 0
VOMITING: 0
DIARRHEA: 0
INDIGESTION: 0
COUGH: 0
BACK PAIN: 0
HEARTBURN: 0
ABDOMINAL PAIN: 0
NAUSEA: 0
WHEEZING: 0
BLOOD IN STOOL: 0
SKIN LESIONS: 0
CONSTIPATION: 0
EYE DISCHARGE: 0
SHORTNESS OF BREATH: 0

## 2024-09-23 ENCOUNTER — TELEPHONE (OUTPATIENT)
Dept: UROLOGY | Age: 69
End: 2024-09-23

## 2024-09-23 NOTE — TELEPHONE ENCOUNTER
Patient left voicemail wanting to reschedule missed appointment. Called patient to schedule unable to leave voicemail due to it being full

## 2024-10-11 ENCOUNTER — NURSE ONLY (OUTPATIENT)
Dept: UROLOGY | Age: 69
End: 2024-10-11
Payer: COMMERCIAL

## 2024-10-11 DIAGNOSIS — E29.1 HYPOGONADISM IN MALE: Primary | ICD-10-CM

## 2024-10-11 PROCEDURE — 96372 THER/PROPH/DIAG INJ SC/IM: CPT | Performed by: UROLOGY

## 2024-10-11 RX ORDER — TESTOSTERONE CYPIONATE 200 MG/ML
200 INJECTION, SOLUTION INTRAMUSCULAR ONCE
Status: COMPLETED | OUTPATIENT
Start: 2024-10-11 | End: 2024-10-11

## 2024-10-11 RX ADMIN — TESTOSTERONE CYPIONATE 200 MG: 200 INJECTION, SOLUTION INTRAMUSCULAR at 12:05

## 2024-10-21 DIAGNOSIS — M54.16 LUMBAR RADICULOPATHY: Primary | ICD-10-CM

## 2024-10-21 RX ORDER — TRAMADOL HYDROCHLORIDE 50 MG/1
50 TABLET ORAL 2 TIMES DAILY PRN
Qty: 60 TABLET | Refills: 0 | Status: SHIPPED | OUTPATIENT
Start: 2024-10-21 | End: 2024-11-20

## 2024-11-11 ENCOUNTER — NURSE ONLY (OUTPATIENT)
Dept: UROLOGY | Age: 69
End: 2024-11-11
Payer: COMMERCIAL

## 2024-11-11 DIAGNOSIS — E29.1 HYPOGONADISM IN MALE: Primary | ICD-10-CM

## 2024-11-11 PROCEDURE — 96372 THER/PROPH/DIAG INJ SC/IM: CPT | Performed by: UROLOGY

## 2024-11-11 RX ORDER — TESTOSTERONE CYPIONATE 200 MG/ML
200 INJECTION, SOLUTION INTRAMUSCULAR ONCE
Status: COMPLETED | OUTPATIENT
Start: 2024-11-11 | End: 2024-11-11

## 2024-11-11 RX ADMIN — TESTOSTERONE CYPIONATE 200 MG: 200 INJECTION, SOLUTION INTRAMUSCULAR at 14:09

## 2024-11-21 ENCOUNTER — OFFICE VISIT (OUTPATIENT)
Dept: ORTHOPEDIC SURGERY | Age: 69
End: 2024-11-21
Payer: COMMERCIAL

## 2024-11-21 ENCOUNTER — TELEPHONE (OUTPATIENT)
Dept: ORTHOPEDIC SURGERY | Age: 69
End: 2024-11-21

## 2024-11-21 DIAGNOSIS — M47.816 SPONDYLOSIS OF LUMBAR REGION WITHOUT MYELOPATHY OR RADICULOPATHY: ICD-10-CM

## 2024-11-21 DIAGNOSIS — M46.1 SACROILIITIS (HCC): ICD-10-CM

## 2024-11-21 DIAGNOSIS — M54.50 LUMBAR BACK PAIN: ICD-10-CM

## 2024-11-21 DIAGNOSIS — M54.16 LUMBAR RADICULOPATHY: Primary | ICD-10-CM

## 2024-11-21 PROCEDURE — 99213 OFFICE O/P EST LOW 20 MIN: CPT | Performed by: PHYSICAL MEDICINE & REHABILITATION

## 2024-11-21 PROCEDURE — 1123F ACP DISCUSS/DSCN MKR DOCD: CPT | Performed by: PHYSICAL MEDICINE & REHABILITATION

## 2024-11-21 NOTE — PROGRESS NOTES
Date:  11/21/24     HPI:  I am seeing Ace Castillo in evalution/folowup.  I saw him recently 5 months ago.  He has pain the lower lumbar paraspinal areas for which trigger point injection may have some benefit but now is having more pain in the upper buttock area on the left.  In the past a left L4 selective nerve root block had some benefit and then sacroiliac injection more recently only worked about a month.  His complaints are unchanged.  Do not get a history of significant radiating pain at this time.  He does have gait abnormality for which he uses a cane, has a leg length discrepancy.  We did discuss spine imaging about 2 years ago with MRI.  For some reason it never got done.  He does not recall that he ever did and I do not have the records showing any reports generated.    ROS: As above.    PE: Alert and cooperative.  Has leg length discrepancy, presents with a cane.  Has tenderness to lower lumbar paraspinal areas but main the left upper buttock area.  Good range of motion of the hips.    Assessment/Plan:       PREDOMINANTLY MUSCULOSKELETAL LUMBOSACRAL  SPINE PAIN.  Mostly left buttock pain.  Has some response to selective nerve root block in the past but more recently has been more difficult to get a response, especially with sacroiliac injection.  We could consider repeat nerve root block or translaminar EMMANUEL but I will need imaging first.  I do not feel comfortable performing more injections without a better look at his lumbar spine.  Discussed at length with the patient stressed he needs to get this study completed for I can proceed further.  I will get back in touch with him once I have this information via telephone.        NOREEN THAKUR JR, MD

## 2024-11-21 NOTE — TELEPHONE ENCOUNTER
MRI LUMBAR SPINE APPROVAL     Your request is approved!     Request #[id number]  Submitted on u 11/21/2024 02:00:54 pm EST  MEMBER  Ace Castillo  1955 (69 year old M)  PCP Tyrell Mansfield NPI [id number]  PLAN    [employer] ID: [id number]  Enrolled 4/1/2024 -   REQUESTING PROVIDER / FACILITY  Luis Rob  NPI: [id number]  [facility]   [address] [place] [address]  [phone] Phone  [phone] Fax  TIN: [phone]  SERVICING PROVIDER / FACILITY  Luis Rob  NPI: [id number]  [address] [address] [phone] Phone  [phone] Fax  TIN: [phone]   REASON FOR CARE  M54.50 - Low back pain, unspecified  TYPE OF CARE  95475 Approved  An MRI (magnetic resonance imaging) of the interior of the lower spine (spinal canal). A contrast agent may be given to enhance the images.  2024-12-02 - 2025-01-02 1 Units  CONTACT  Sarahy  [phone] Phone  [phone] Fax   If you need any help with this request, please call the [facility] team at [phone].  Clinical Documentation    [email or web address]  Add Medical Documentation  Upload any relevant clinical documentation needed for a determination.  Clinical Notes  Add Note

## 2024-11-25 ENCOUNTER — NURSE ONLY (OUTPATIENT)
Dept: UROLOGY | Age: 69
End: 2024-11-25
Payer: COMMERCIAL

## 2024-11-25 DIAGNOSIS — E29.1 HYPOGONADISM IN MALE: Primary | ICD-10-CM

## 2024-11-25 PROCEDURE — 96372 THER/PROPH/DIAG INJ SC/IM: CPT | Performed by: UROLOGY

## 2024-11-25 RX ORDER — TESTOSTERONE CYPIONATE 200 MG/ML
200 INJECTION, SOLUTION INTRAMUSCULAR ONCE
Status: COMPLETED | OUTPATIENT
Start: 2024-11-25 | End: 2024-11-25

## 2024-11-25 RX ADMIN — TESTOSTERONE CYPIONATE 200 MG: 200 INJECTION, SOLUTION INTRAMUSCULAR at 09:46

## 2024-12-09 ENCOUNTER — NURSE ONLY (OUTPATIENT)
Dept: UROLOGY | Age: 69
End: 2024-12-09
Payer: COMMERCIAL

## 2024-12-09 DIAGNOSIS — E29.1 HYPOGONADISM IN MALE: Primary | ICD-10-CM

## 2024-12-09 PROCEDURE — 96372 THER/PROPH/DIAG INJ SC/IM: CPT | Performed by: UROLOGY

## 2024-12-09 RX ORDER — TESTOSTERONE CYPIONATE 200 MG/ML
200 INJECTION, SOLUTION INTRAMUSCULAR ONCE
Status: COMPLETED | OUTPATIENT
Start: 2024-12-09 | End: 2024-12-09

## 2024-12-09 RX ADMIN — TESTOSTERONE CYPIONATE 200 MG: 200 INJECTION, SOLUTION INTRAMUSCULAR at 10:33

## 2024-12-09 NOTE — PROGRESS NOTES
Per Dr Floyd's note from 06/14/2024, patient due to receive 200 mg IM injection every 2 weeks. Patient received injection IM in [facility] [place] without incident.      Patient brought testosterone from pharmacy.

## 2024-12-13 ENCOUNTER — CLINICAL DOCUMENTATION (OUTPATIENT)
Dept: ORTHOPEDIC SURGERY | Age: 69
End: 2024-12-13

## 2024-12-13 ENCOUNTER — TELEPHONE (OUTPATIENT)
Dept: ORTHOPEDIC SURGERY | Age: 69
End: 2024-12-13

## 2024-12-13 DIAGNOSIS — M54.16 LUMBAR RADICULOPATHY: Primary | ICD-10-CM

## 2024-12-13 NOTE — PROGRESS NOTES
I reviewed MRI lumbar spine results with patient today.  Separate note.  This does reveal severe spinal stenosis at the L3-L4 and L4-L5 level.  This without dramatic change from prior studies does show some progression, more so with neuroforaminal narrowing.  He is complaining of a lot of pain in the left buttock area.  Non-fluoroscopically guided trigger point injections have helped some of the pain he has had in the lower lumbar areas but again now it is more to the buttock area.  Some testing provocative for sacroiliac joint mediated pain was suggestive of that problem but unfortunately sacroiliac injection did not help.  He has some gait abnormality from a leg length discrepancy and does use a cane for balance.    His pain scale is 8/10.  He has trouble doing any type of lumbar spine exercise because of balance issues and general debility.  He has been on over-the-counter medications, also tramadol without full improvement.  The pain causes trouble standing, walking, doing things around house, bathing, cleaning, dressing.    He still have significant pain and I would recommend a translaminar lumbar EMMANUEL at the L4-L5 level..  There was some difficulty accessing the epidural space in the past but selective nerve root blocks did not offer a better response.  This will be noted when he comes in for such injection.  This would address his issues with regards to his significant lumbar spinal stenosis.  The patient is aware there could be a spine surgical issue down the road I do not think that is the primary consideration right now.  Certainly if injections lose benefit, that be more of a discussion.  Once we can get injections approved, we will schedule him and do our due diligence on getting approval to come off of his anticoagulant therapy for 3 days prior to a translaminar lumbar EMMANUEL.

## 2024-12-13 NOTE — TELEPHONE ENCOUNTER
Late MRI lumbar spine results to patient.  He has significant degenerative changes, significant spinal stenosis at the L3-L4 and L5-S1 levels.  His buttock pain did not respond to a sacroiliac injection.  I will consider a translaminar lumbar EMMANUEL.  Will try to get this approved.  He will need to come off his anticoagulant therapy 3 days before hand.  I will get back in touch with him once we have insurance approval and proceed from there.

## 2024-12-13 NOTE — TELEPHONE ENCOUNTER
He is waiting to get results of his MRI. He says it has been a couple of weeks and that [facility] was going to call him.

## 2024-12-23 ENCOUNTER — NURSE ONLY (OUTPATIENT)
Dept: UROLOGY | Age: 69
End: 2024-12-23
Payer: COMMERCIAL

## 2024-12-23 DIAGNOSIS — E29.1 HYPOGONADISM IN MALE: Primary | ICD-10-CM

## 2024-12-23 DIAGNOSIS — E29.1 HYPOGONADISM IN MALE: ICD-10-CM

## 2024-12-23 PROCEDURE — 96372 THER/PROPH/DIAG INJ SC/IM: CPT | Performed by: UROLOGY

## 2024-12-23 RX ORDER — TESTOSTERONE CYPIONATE 200 MG/ML
200 INJECTION, SOLUTION INTRAMUSCULAR ONCE
Status: COMPLETED | OUTPATIENT
Start: 2024-12-23 | End: 2024-12-23

## 2024-12-23 RX ADMIN — TESTOSTERONE CYPIONATE 200 MG: 200 INJECTION, SOLUTION INTRAMUSCULAR at 11:19

## 2024-12-23 NOTE — PROGRESS NOTES
Per Dr Floyd's note from 06/14/2024, patient due to receive 200 mg IM injection every 2 weeks. Patient received injection IM in [facility] today without incident.

## 2024-12-30 RX ORDER — TESTOSTERONE CYPIONATE 200 MG/ML
200 INJECTION, SOLUTION INTRAMUSCULAR
Qty: 10 EACH | Refills: 5 | Status: SHIPPED | OUTPATIENT
Start: 2024-12-30 | End: 2025-12-30

## 2025-01-06 DIAGNOSIS — M54.16 LUMBAR RADICULOPATHY: Primary | ICD-10-CM

## 2025-01-06 RX ORDER — TRAMADOL HYDROCHLORIDE 50 MG/1
50 TABLET ORAL 2 TIMES DAILY PRN
Qty: 60 TABLET | Refills: 0 | Status: SHIPPED | OUTPATIENT
Start: 2025-01-06 | End: 2025-02-05

## 2025-01-22 ENCOUNTER — TELEPHONE (OUTPATIENT)
Dept: PULMONOLOGY | Age: 70
End: 2025-01-22

## 2025-01-22 NOTE — TELEPHONE ENCOUNTER
Spoke with the patient and explained that he is overdue for an appointment with Dr. Long and  CT.  Patient was currently driving and was not able to locate a pen to write down appointment times and number for Radiology.  I explained that I will call the patient again tomorrow (Morning) per patient's request.  No further questions or concerns were asked at this time. // Jeanette QUINTERO

## 2025-01-22 NOTE — TELEPHONE ENCOUNTER
PROVIDER FEEDBACK LOOP CALLED 3X     Patient:Ace Castillo  : 1955  Referring Provider: NAY YOON  Referral Type:  Imaging     Procedures:  81695 (CPT®) - CT CHEST WO CONTRAST  Date Service Ordered 2024        We were unable to reach Ace Castillo to schedule the test ordered by your office after 3 outreach attempts via either text, email and/or phone call.  Please call/follow up with your patient to explain the significance of the ordered test and direct the patient to call Central Scheduling to schedule the test at their earliest convenience.     Please complete one of the following actions from Quick Actions buttons:     Route to Provider:  Route message to ordering provider to seek next steps in care plan.     Telephone Encounter:  Telephone encounter will open.  Call patient to explain significance of ordered test and direct patient to call Central Scheduling to schedule test then Document details of call.     Open Referral:  Review referral notes or details if needed.     Close Referral:  Referral will open.  Document in Notes section of referral why the referral is being closed.  Examples of referral closure:  Patient had test done outside of of an office in the [facility], Patient refuses test, Patient no longer having symptoms, Unable to reach patient.  Only close the referral if you are sure the test will not proceed.     Thank you     Pre-Access Scheduling Team

## 2025-01-24 ENCOUNTER — NURSE ONLY (OUTPATIENT)
Dept: UROLOGY | Age: 70
End: 2025-01-24
Payer: MEDICARE

## 2025-01-24 DIAGNOSIS — E29.1 HYPOGONADISM IN MALE: Primary | ICD-10-CM

## 2025-01-24 PROCEDURE — 96372 THER/PROPH/DIAG INJ SC/IM: CPT | Performed by: UROLOGY

## 2025-01-24 RX ORDER — TESTOSTERONE CYPIONATE 200 MG/ML
200 INJECTION, SOLUTION INTRAMUSCULAR ONCE
Status: COMPLETED | OUTPATIENT
Start: 2025-01-24 | End: 2025-01-24

## 2025-01-24 RX ADMIN — TESTOSTERONE CYPIONATE 200 MG: 200 INJECTION, SOLUTION INTRAMUSCULAR at 10:56

## 2025-01-24 NOTE — TELEPHONE ENCOUNTER
Spoke with the patient and he agreed to see Dr. Long on 02/03/2025 @ 1:15 pm.  I also provided the patient the number to Radiology scheduling and he stated that he will give them a call to get the CT scan scheduled. // Jeanette QUINTERO

## 2025-02-07 ENCOUNTER — NURSE ONLY (OUTPATIENT)
Dept: UROLOGY | Age: 70
End: 2025-02-07
Payer: MEDICARE

## 2025-02-07 DIAGNOSIS — E29.1 HYPOGONADISM IN MALE: Primary | ICD-10-CM

## 2025-02-07 PROCEDURE — 96372 THER/PROPH/DIAG INJ SC/IM: CPT | Performed by: UROLOGY

## 2025-02-07 RX ORDER — TESTOSTERONE CYPIONATE 200 MG/ML
200 INJECTION, SOLUTION INTRAMUSCULAR ONCE
Status: COMPLETED | OUTPATIENT
Start: 2025-02-07 | End: 2025-02-07

## 2025-02-07 RX ADMIN — TESTOSTERONE CYPIONATE 200 MG: 200 INJECTION, SOLUTION INTRAMUSCULAR at 11:05

## 2025-02-21 ENCOUNTER — NURSE ONLY (OUTPATIENT)
Dept: UROLOGY | Age: 70
End: 2025-02-21
Payer: MEDICARE

## 2025-02-21 DIAGNOSIS — E29.1 HYPOGONADISM IN MALE: Primary | ICD-10-CM

## 2025-02-21 PROCEDURE — 96372 THER/PROPH/DIAG INJ SC/IM: CPT | Performed by: UROLOGY

## 2025-02-21 RX ORDER — TESTOSTERONE CYPIONATE 200 MG/ML
200 INJECTION, SOLUTION INTRAMUSCULAR ONCE
Status: COMPLETED | OUTPATIENT
Start: 2025-02-21 | End: 2025-02-21

## 2025-02-21 RX ADMIN — TESTOSTERONE CYPIONATE 200 MG: 200 INJECTION, SOLUTION INTRAMUSCULAR at 12:15

## 2025-03-17 ENCOUNTER — NURSE ONLY (OUTPATIENT)
Dept: UROLOGY | Age: 70
End: 2025-03-17
Payer: MEDICARE

## 2025-03-17 DIAGNOSIS — E29.1 HYPOGONADISM IN MALE: Primary | ICD-10-CM

## 2025-03-17 PROCEDURE — 96372 THER/PROPH/DIAG INJ SC/IM: CPT | Performed by: UROLOGY

## 2025-03-17 RX ORDER — TESTOSTERONE CYPIONATE 200 MG/ML
200 INJECTION, SOLUTION INTRAMUSCULAR ONCE
Status: COMPLETED | OUTPATIENT
Start: 2025-03-17 | End: 2025-03-17

## 2025-03-17 RX ADMIN — TESTOSTERONE CYPIONATE 200 MG: 200 INJECTION, SOLUTION INTRAMUSCULAR at 10:52

## 2025-04-03 ENCOUNTER — OFFICE VISIT (OUTPATIENT)
Dept: FAMILY MEDICINE CLINIC | Facility: CLINIC | Age: 70
End: 2025-04-03
Payer: MEDICARE

## 2025-04-03 VITALS
SYSTOLIC BLOOD PRESSURE: 134 MMHG | BODY MASS INDEX: 28.18 KG/M2 | RESPIRATION RATE: 16 BRPM | HEIGHT: 67 IN | DIASTOLIC BLOOD PRESSURE: 80 MMHG

## 2025-04-03 DIAGNOSIS — R21 SKIN RASH: Primary | ICD-10-CM

## 2025-04-03 PROCEDURE — 1123F ACP DISCUSS/DSCN MKR DOCD: CPT | Performed by: FAMILY MEDICINE

## 2025-04-03 PROCEDURE — 1126F AMNT PAIN NOTED NONE PRSNT: CPT | Performed by: FAMILY MEDICINE

## 2025-04-03 PROCEDURE — 1159F MED LIST DOCD IN RCRD: CPT | Performed by: FAMILY MEDICINE

## 2025-04-03 PROCEDURE — 3017F COLORECTAL CA SCREEN DOC REV: CPT | Performed by: FAMILY MEDICINE

## 2025-04-03 PROCEDURE — 99213 OFFICE O/P EST LOW 20 MIN: CPT | Performed by: FAMILY MEDICINE

## 2025-04-03 PROCEDURE — 3079F DIAST BP 80-89 MM HG: CPT | Performed by: FAMILY MEDICINE

## 2025-04-03 PROCEDURE — 4004F PT TOBACCO SCREEN RCVD TLK: CPT | Performed by: FAMILY MEDICINE

## 2025-04-03 PROCEDURE — 11104 PUNCH BX SKIN SINGLE LESION: CPT | Performed by: FAMILY MEDICINE

## 2025-04-03 PROCEDURE — 3075F SYST BP GE 130 - 139MM HG: CPT | Performed by: FAMILY MEDICINE

## 2025-04-03 PROCEDURE — G8427 DOCREV CUR MEDS BY ELIG CLIN: HCPCS | Performed by: FAMILY MEDICINE

## 2025-04-03 PROCEDURE — G8417 CALC BMI ABV UP PARAM F/U: HCPCS | Performed by: FAMILY MEDICINE

## 2025-04-03 RX ORDER — TRIAMCINOLONE ACETONIDE 1 MG/G
CREAM TOPICAL
Qty: 80 G | Refills: 1 | Status: SHIPPED | OUTPATIENT
Start: 2025-04-03

## 2025-04-03 RX ORDER — TRAMADOL HYDROCHLORIDE 50 MG/1
TABLET ORAL
COMMUNITY
Start: 2025-03-03

## 2025-04-03 RX ORDER — CLOTRIMAZOLE 1 %
CREAM (GRAM) TOPICAL
Qty: 85 G | Refills: 1 | Status: SHIPPED | OUTPATIENT
Start: 2025-04-03 | End: 2025-04-10

## 2025-04-03 SDOH — ECONOMIC STABILITY: FOOD INSECURITY: WITHIN THE PAST 12 MONTHS, YOU WORRIED THAT YOUR FOOD WOULD RUN OUT BEFORE YOU GOT MONEY TO BUY MORE.: NEVER TRUE

## 2025-04-03 SDOH — ECONOMIC STABILITY: FOOD INSECURITY: WITHIN THE PAST 12 MONTHS, THE FOOD YOU BOUGHT JUST DIDN'T LAST AND YOU DIDN'T HAVE MONEY TO GET MORE.: NEVER TRUE

## 2025-04-03 ASSESSMENT — LIFESTYLE VARIABLES
HOW MANY STANDARD DRINKS CONTAINING ALCOHOL DO YOU HAVE ON A TYPICAL DAY: PATIENT DOES NOT DRINK
HOW OFTEN DO YOU HAVE A DRINK CONTAINING ALCOHOL: NEVER

## 2025-04-03 ASSESSMENT — PATIENT HEALTH QUESTIONNAIRE - PHQ9
SUM OF ALL RESPONSES TO PHQ QUESTIONS 1-9: 0
1. LITTLE INTEREST OR PLEASURE IN DOING THINGS: NOT AT ALL
SUM OF ALL RESPONSES TO PHQ QUESTIONS 1-9: 0
2. FEELING DOWN, DEPRESSED OR HOPELESS: NOT AT ALL
SUM OF ALL RESPONSES TO PHQ QUESTIONS 1-9: 0
SUM OF ALL RESPONSES TO PHQ QUESTIONS 1-9: 0

## 2025-04-03 ASSESSMENT — ENCOUNTER SYMPTOMS: RESPIRATORY NEGATIVE: 1

## 2025-04-04 DIAGNOSIS — R21 SKIN RASH: ICD-10-CM

## 2025-04-04 NOTE — PROGRESS NOTES
PROGRESS NOTE    SUBJECTIVE:   Ace Castillo is a 69 y.o. male seen for a follow up visit regarding   Chief Complaint   Patient presents with    Medicare AWV     Subsequent     Rash     States that he is broke out all over now, but it started on his right ankle. Has been taking OC Benadryl, but states that it hasn't helped. Just helps with the itching.         HPI:  Patient comes in with a rash on both lower ankles, has been there for several weeks, seemingly getting worse.  He thinks this is spreading to other parts of his body.         Reviewed and updated this visit by provider:           Review of Systems   Respiratory: Negative.     Cardiovascular: Negative.           OBJECTIVE:  Vitals:    04/03/25 1506   BP: 134/80   Resp: 16   Height: 1.702 m (5' 7.01\")        Physical Exam   General: Alert and oriented x3, chronically ill-appearing  Neck: No adenopathy, thyromegaly or thyroid nodules  Pulmonary: Normal effort, good airflow, no rales or rhonchi  CVS: Regular rate and rhythm, normal S1, S2, no S3 or S4, no murmurs; no carotid bruits, 2+ pedal pulses  Skin: The right ankle demonstrates thickened skin with scale, erythema, raised border without central clearing.        Medical problems and test results were reviewed with the patient today.     No results found for this or any previous visit (from the past 4 weeks).    No results found for any visits on 04/03/25.     ASSESSMENT and PLAN    1. Skin rash     Suspicious for eczema with associated tinea.    After antiseptic prep with Betadine and local anesthesia with lidocaine with epinephrine, 3 mL punch biopsy was taken without complication.  Wound was cauterized with silver nitrate, no excessive bleeding or bruising.  Wound care instructions provided.    Prescriptions for clotrimazole and triamcinolone provided.    No follow-ups on file.       Tyrell Mansfield MD

## 2025-04-14 ENCOUNTER — TELEPHONE (OUTPATIENT)
Dept: FAMILY MEDICINE CLINIC | Facility: CLINIC | Age: 70
End: 2025-04-14

## 2025-04-14 NOTE — TELEPHONE ENCOUNTER
----- Message from KRISTOFER HOFFMAN MA sent at 4/14/2025 10:05 AM EDT -----  Regarding: wants result  Pt wants recent path results.

## 2025-04-15 ENCOUNTER — TELEPHONE (OUTPATIENT)
Dept: UROLOGY | Age: 70
End: 2025-04-15

## 2025-04-18 ENCOUNTER — CLINICAL SUPPORT (OUTPATIENT)
Dept: UROLOGY | Age: 70
End: 2025-04-18
Payer: MEDICARE

## 2025-04-18 DIAGNOSIS — E29.1 HYPOGONADISM IN MALE: Primary | ICD-10-CM

## 2025-04-18 PROCEDURE — 96372 THER/PROPH/DIAG INJ SC/IM: CPT | Performed by: UROLOGY

## 2025-04-18 RX ORDER — TESTOSTERONE CYPIONATE 200 MG/ML
200 INJECTION, SOLUTION INTRAMUSCULAR ONCE
Status: COMPLETED | OUTPATIENT
Start: 2025-04-18 | End: 2025-04-18

## 2025-04-18 RX ADMIN — TESTOSTERONE CYPIONATE 200 MG: 200 INJECTION, SOLUTION INTRAMUSCULAR at 10:07

## 2025-04-22 ENCOUNTER — OFFICE VISIT (OUTPATIENT)
Dept: FAMILY MEDICINE CLINIC | Facility: CLINIC | Age: 70
End: 2025-04-22
Payer: MEDICARE

## 2025-04-22 VITALS
RESPIRATION RATE: 16 BRPM | HEART RATE: 108 BPM | DIASTOLIC BLOOD PRESSURE: 80 MMHG | OXYGEN SATURATION: 96 % | SYSTOLIC BLOOD PRESSURE: 130 MMHG | HEIGHT: 67 IN | BODY MASS INDEX: 30.76 KG/M2 | WEIGHT: 196 LBS

## 2025-04-22 DIAGNOSIS — L30.8 SPONGIOTIC DERMATITIS: Primary | ICD-10-CM

## 2025-04-22 PROCEDURE — 3079F DIAST BP 80-89 MM HG: CPT | Performed by: PHYSICIAN ASSISTANT

## 2025-04-22 PROCEDURE — 4004F PT TOBACCO SCREEN RCVD TLK: CPT | Performed by: PHYSICIAN ASSISTANT

## 2025-04-22 PROCEDURE — 99213 OFFICE O/P EST LOW 20 MIN: CPT | Performed by: PHYSICIAN ASSISTANT

## 2025-04-22 PROCEDURE — 3075F SYST BP GE 130 - 139MM HG: CPT | Performed by: PHYSICIAN ASSISTANT

## 2025-04-22 PROCEDURE — 1159F MED LIST DOCD IN RCRD: CPT | Performed by: PHYSICIAN ASSISTANT

## 2025-04-22 PROCEDURE — 1123F ACP DISCUSS/DSCN MKR DOCD: CPT | Performed by: PHYSICIAN ASSISTANT

## 2025-04-22 PROCEDURE — G8417 CALC BMI ABV UP PARAM F/U: HCPCS | Performed by: PHYSICIAN ASSISTANT

## 2025-04-22 PROCEDURE — 3017F COLORECTAL CA SCREEN DOC REV: CPT | Performed by: PHYSICIAN ASSISTANT

## 2025-04-22 PROCEDURE — G8427 DOCREV CUR MEDS BY ELIG CLIN: HCPCS | Performed by: PHYSICIAN ASSISTANT

## 2025-04-22 RX ORDER — HYDROCORTISONE 25 MG/G
CREAM TOPICAL
Qty: 20 G | Refills: 1 | Status: SHIPPED | OUTPATIENT
Start: 2025-04-22

## 2025-04-22 NOTE — PROGRESS NOTES
PROGRESS NOTE    SUBJECTIVE:   Ace Castillo is a 69 y.o. male seen for a follow up visit regarding   Chief Complaint   Patient presents with    Results     Discuss path results        HPI:  HPI    Pt presents to follow up on rash and biopsy results, which showed a spongiotic dermatitis and was negative for fungal organisms. Notes the rash has been doing very well with the triamcinolone.     Reviewed and updated this visit by provider:           Review of Systems   Skin:  Positive for rash.          OBJECTIVE:  Vitals:    04/22/25 1435   BP: 130/80   BP Site: Left Upper Arm   Patient Position: Sitting   BP Cuff Size: Medium Adult   Pulse: (!) 108   Resp: 16   SpO2: 96%   Weight: 88.9 kg (196 lb)   Height: 1.702 m (5' 7\")        Physical Exam  Constitutional:       General: He is not in acute distress.     Appearance: He is not toxic-appearing.   HENT:      Head: Normocephalic and atraumatic.   Eyes:      Extraocular Movements: Extraocular movements intact.      Conjunctiva/sclera: Conjunctivae normal.      Pupils: Pupils are equal, round, and reactive to light.   Musculoskeletal:         General: No deformity. Normal range of motion.   Skin:     General: Skin is warm and dry.      Comments: Erythema and scale to bilateral extremities and face   Neurological:      General: No focal deficit present.      Mental Status: He is alert and oriented to person, place, and time.      Gait: Gait normal.   Psychiatric:         Mood and Affect: Mood normal.         Behavior: Behavior normal.         Judgment: Judgment normal.          Medical problems and test results were reviewed with the patient today.     No results found for this or any previous visit (from the past 4 weeks).    No results found for any visits on 04/22/25.     ASSESSMENT and PLAN    1. Spongiotic dermatitis  -     hydrocortisone 2.5 % cream; Apply topically 2 times daily., Disp-20 g, R-1, Normal     Continue triamcinolone on legs; will do hydrocortisone

## 2025-04-23 ENCOUNTER — RESULTS FOLLOW-UP (OUTPATIENT)
Dept: FAMILY MEDICINE CLINIC | Facility: CLINIC | Age: 70
End: 2025-04-23

## 2025-04-28 ENCOUNTER — OFFICE VISIT (OUTPATIENT)
Dept: ORTHOPEDIC SURGERY | Age: 70
End: 2025-04-28
Payer: MEDICARE

## 2025-04-28 DIAGNOSIS — M48.061 SPINAL STENOSIS OF LUMBAR REGION WITHOUT NEUROGENIC CLAUDICATION: ICD-10-CM

## 2025-04-28 DIAGNOSIS — M79.10 MYALGIA: ICD-10-CM

## 2025-04-28 DIAGNOSIS — M54.50 LUMBAR BACK PAIN: Primary | ICD-10-CM

## 2025-04-28 PROCEDURE — 20552 NJX 1/MLT TRIGGER POINT 1/2: CPT | Performed by: PHYSICAL MEDICINE & REHABILITATION

## 2025-04-28 RX ORDER — TRAMADOL HYDROCHLORIDE 50 MG/1
50 TABLET ORAL 2 TIMES DAILY PRN
Qty: 60 TABLET | Refills: 2 | Status: SHIPPED | OUTPATIENT
Start: 2025-04-28 | End: 2025-07-27

## 2025-04-28 RX ORDER — TRIAMCINOLONE ACETONIDE 40 MG/ML
40 INJECTION, SUSPENSION INTRA-ARTICULAR; INTRAMUSCULAR ONCE
Status: COMPLETED | OUTPATIENT
Start: 2025-04-28 | End: 2025-04-28

## 2025-04-28 RX ADMIN — TRIAMCINOLONE ACETONIDE 40 MG: 40 INJECTION, SUSPENSION INTRA-ARTICULAR; INTRAMUSCULAR at 12:20

## 2025-04-28 NOTE — PROGRESS NOTES
Date:  04/28/25      Diagnosis Orders   1. Lumbar back pain  INJECT TRIGGER POINT, 1 OR 2    triamcinolone acetonide (KENALOG-40) injection 40 mg      2. Myalgia  INJECT TRIGGER POINT, 1 OR 2    triamcinolone acetonide (KENALOG-40) injection 40 mg      3. Spinal stenosis of lumbar region without neurogenic claudication            HPI:  I am seeing Ace Castillo in evalution/folowup.  I saw him most recently 5 months ago.  He has pain in the lower lumbar paraspinal areas for the most part right now.  More recently he had pain in the left buttock area and despite our best efforts we were unable to get approval for a translaminar lumbar EMMANUEL.  Fortunately those symptoms have gotten better but he did deal with it for a while so again frustrating.  He has some degree of gait abnormality, has a leg length discrepancy in which his left leg is shorter in the right he does use a cane.    Because of the pain in the left buttock and also some diffuse weakness in the lower extremities, we did obtain an MRI of the lumbar spine reveals severe central spinal stenosis at L3-L4 and L4-5 levels.  I do not get a history of the event of progressive gait disturbance.    She also may consider possibly have sacroiliac joint mediated pain.  He does take tramadol once or twice a day he does need refills today.  There are no tolerability issues.  I have examined his situation with regards to [place]  aware and he received his medication only from me.    He does feel the pain is in lower lumbar paraspinal areas, aggravated by ambulation.    ROS: No new problems    PE: Alert and cooperative.  He presents with a cane.  Has reasonably good strength lower extremities.  No lateralizing findings.  Good range of motion of the hips.  He has tenderness in the lower lumbar paraspinal areas.    Assessment/Plan:     PREDOMINANTLY MUSCULOSKELETAL LUMBOSACRAL SPINE PAIN.  In the context of significant lumbar spinal stenosis, he is having pain more in the

## 2025-05-02 DIAGNOSIS — E29.1 HYPOGONADISM IN MALE: ICD-10-CM

## 2025-05-04 RX ORDER — TESTOSTERONE CYPIONATE 200 MG/ML
200 INJECTION, SOLUTION INTRAMUSCULAR
Qty: 10 EACH | Refills: 5 | Status: SHIPPED | OUTPATIENT
Start: 2025-05-04 | End: 2026-05-04

## 2025-05-06 ENCOUNTER — TELEPHONE (OUTPATIENT)
Dept: UROLOGY | Age: 70
End: 2025-05-06

## 2025-05-06 NOTE — TELEPHONE ENCOUNTER
Fax received from the patient's insurance, [employer] via [employer]. The prior authorization request for the patient's prescription for testosterone cyp has been approved through the end of the year. Patient notified.    Per the website:

## 2025-05-13 ENCOUNTER — TELEPHONE (OUTPATIENT)
Dept: UROLOGY | Age: 70
End: 2025-05-13

## 2025-05-13 NOTE — TELEPHONE ENCOUNTER
Patient has called in wanting to schedule an injection with a nurse. Not sure if user error, but was not able to find an opening

## 2025-05-16 DIAGNOSIS — E78.2 MIXED HYPERLIPIDEMIA: ICD-10-CM

## 2025-05-16 DIAGNOSIS — I10 PRIMARY HYPERTENSION: ICD-10-CM

## 2025-05-19 ENCOUNTER — CLINICAL SUPPORT (OUTPATIENT)
Dept: UROLOGY | Age: 70
End: 2025-05-19
Payer: MEDICARE

## 2025-05-19 DIAGNOSIS — E29.1 HYPOGONADISM IN MALE: Primary | ICD-10-CM

## 2025-05-19 PROCEDURE — 96372 THER/PROPH/DIAG INJ SC/IM: CPT | Performed by: UROLOGY

## 2025-05-19 RX ORDER — TESTOSTERONE CYPIONATE 200 MG/ML
200 INJECTION, SOLUTION INTRAMUSCULAR ONCE
Status: COMPLETED | OUTPATIENT
Start: 2025-05-19 | End: 2025-05-19

## 2025-05-19 RX ADMIN — TESTOSTERONE CYPIONATE 200 MG: 200 INJECTION, SOLUTION INTRAMUSCULAR at 11:06

## 2025-05-19 NOTE — TELEPHONE ENCOUNTER
Last OV: 4/22/25  Next OV: not yet scheduled     atorvastatin (LIPITOR) 80 MG tablet   Last Written: 5/6/24  For: 90 with 3 refills.     carvedilol (COREG) 6.25 MG tablet   Last Written: 5/6/24   For: 180 with 3 refills.

## 2025-05-20 RX ORDER — CARVEDILOL 6.25 MG/1
6.25 TABLET ORAL 2 TIMES DAILY
Qty: 180 TABLET | Refills: 3 | Status: SHIPPED | OUTPATIENT
Start: 2025-05-20

## 2025-05-20 RX ORDER — ATORVASTATIN CALCIUM 80 MG/1
80 TABLET, FILM COATED ORAL NIGHTLY
Qty: 90 TABLET | Refills: 3 | Status: SHIPPED | OUTPATIENT
Start: 2025-05-20

## 2025-06-06 ENCOUNTER — CLINICAL SUPPORT (OUTPATIENT)
Dept: UROLOGY | Age: 70
End: 2025-06-06
Payer: MEDICARE

## 2025-06-06 DIAGNOSIS — E29.1 HYPOGONADISM IN MALE: Primary | ICD-10-CM

## 2025-06-06 PROCEDURE — 96372 THER/PROPH/DIAG INJ SC/IM: CPT | Performed by: UROLOGY

## 2025-06-06 RX ORDER — TESTOSTERONE CYPIONATE 200 MG/ML
200 INJECTION, SOLUTION INTRAMUSCULAR ONCE
Status: COMPLETED | OUTPATIENT
Start: 2025-06-06 | End: 2025-06-06

## 2025-06-06 RX ADMIN — TESTOSTERONE CYPIONATE 200 MG: 200 INJECTION, SOLUTION INTRAMUSCULAR at 10:35

## 2025-06-20 ENCOUNTER — CLINICAL SUPPORT (OUTPATIENT)
Dept: UROLOGY | Age: 70
End: 2025-06-20
Payer: MEDICARE

## 2025-06-20 DIAGNOSIS — E29.1 HYPOGONADISM IN MALE: Primary | ICD-10-CM

## 2025-06-20 PROCEDURE — 96372 THER/PROPH/DIAG INJ SC/IM: CPT | Performed by: UROLOGY

## 2025-06-20 RX ORDER — TESTOSTERONE CYPIONATE 200 MG/ML
200 INJECTION, SOLUTION INTRAMUSCULAR ONCE
Status: COMPLETED | OUTPATIENT
Start: 2025-06-20 | End: 2025-06-20

## 2025-06-20 RX ADMIN — TESTOSTERONE CYPIONATE 200 MG: 200 INJECTION, SOLUTION INTRAMUSCULAR at 10:23

## 2025-07-07 ENCOUNTER — CLINICAL SUPPORT (OUTPATIENT)
Dept: UROLOGY | Age: 70
End: 2025-07-07
Payer: MEDICARE

## 2025-07-07 DIAGNOSIS — E29.1 HYPOGONADISM IN MALE: Primary | ICD-10-CM

## 2025-07-07 PROCEDURE — 96372 THER/PROPH/DIAG INJ SC/IM: CPT | Performed by: UROLOGY

## 2025-07-07 RX ORDER — TESTOSTERONE CYPIONATE 200 MG/ML
200 INJECTION, SOLUTION INTRAMUSCULAR ONCE
Status: COMPLETED | OUTPATIENT
Start: 2025-07-07 | End: 2025-07-07

## 2025-07-07 RX ADMIN — TESTOSTERONE CYPIONATE 200 MG: 200 INJECTION, SOLUTION INTRAMUSCULAR at 09:58

## 2025-07-21 ENCOUNTER — CLINICAL SUPPORT (OUTPATIENT)
Dept: UROLOGY | Age: 70
End: 2025-07-21
Payer: MEDICARE

## 2025-07-21 DIAGNOSIS — E29.1 HYPOGONADISM IN MALE: Primary | ICD-10-CM

## 2025-07-21 PROCEDURE — 96372 THER/PROPH/DIAG INJ SC/IM: CPT | Performed by: UROLOGY

## 2025-07-21 RX ORDER — TESTOSTERONE CYPIONATE 200 MG/ML
200 INJECTION, SOLUTION INTRAMUSCULAR ONCE
Status: COMPLETED | OUTPATIENT
Start: 2025-07-21 | End: 2025-07-21

## 2025-07-21 RX ADMIN — TESTOSTERONE CYPIONATE 200 MG: 200 INJECTION, SOLUTION INTRAMUSCULAR at 10:11

## 2025-08-08 ENCOUNTER — CLINICAL SUPPORT (OUTPATIENT)
Dept: UROLOGY | Age: 70
End: 2025-08-08
Payer: MEDICARE

## 2025-08-08 DIAGNOSIS — E29.1 HYPOGONADISM IN MALE: Primary | ICD-10-CM

## 2025-08-08 PROCEDURE — 96372 THER/PROPH/DIAG INJ SC/IM: CPT | Performed by: UROLOGY

## 2025-08-08 RX ORDER — TESTOSTERONE CYPIONATE 200 MG/ML
200 INJECTION, SOLUTION INTRAMUSCULAR ONCE
Status: COMPLETED | OUTPATIENT
Start: 2025-08-08 | End: 2025-08-08

## 2025-08-08 RX ADMIN — TESTOSTERONE CYPIONATE 200 MG: 200 INJECTION, SOLUTION INTRAMUSCULAR at 11:08

## 2025-08-10 RX ORDER — TESTOSTERONE CYPIONATE 200 MG/ML
200 INJECTION, SOLUTION INTRAMUSCULAR
Qty: 10 EACH | Refills: 5 | Status: SHIPPED | OUTPATIENT
Start: 2025-08-10 | End: 2026-08-10

## 2025-08-20 ENCOUNTER — CLINICAL SUPPORT (OUTPATIENT)
Dept: UROLOGY | Age: 70
End: 2025-08-20
Payer: MEDICARE

## 2025-08-20 DIAGNOSIS — E29.1 HYPOGONADISM IN MALE: Primary | ICD-10-CM

## 2025-08-20 PROCEDURE — 96372 THER/PROPH/DIAG INJ SC/IM: CPT | Performed by: UROLOGY

## 2025-08-20 RX ORDER — TESTOSTERONE CYPIONATE 200 MG/ML
200 INJECTION, SOLUTION INTRAMUSCULAR ONCE
Status: COMPLETED | OUTPATIENT
Start: 2025-08-20 | End: 2025-08-20

## 2025-08-20 RX ADMIN — TESTOSTERONE CYPIONATE 200 MG: 200 INJECTION, SOLUTION INTRAMUSCULAR at 15:57

## 2025-09-03 ENCOUNTER — CLINICAL SUPPORT (OUTPATIENT)
Dept: UROLOGY | Age: 70
End: 2025-09-03
Payer: MEDICARE

## 2025-09-03 ENCOUNTER — LAB (OUTPATIENT)
Dept: UROLOGY | Age: 70
End: 2025-09-03

## 2025-09-03 DIAGNOSIS — E29.1 HYPOGONADISM IN MALE: ICD-10-CM

## 2025-09-03 DIAGNOSIS — N40.0 BENIGN PROSTATIC HYPERPLASIA WITHOUT LOWER URINARY TRACT SYMPTOMS: ICD-10-CM

## 2025-09-03 DIAGNOSIS — E29.1 HYPOGONADISM IN MALE: Primary | ICD-10-CM

## 2025-09-03 LAB
ALBUMIN SERPL-MCNC: 3.4 G/DL (ref 3.2–4.6)
ALBUMIN/GLOB SERPL: 0.9 (ref 1–1.9)
ALP SERPL-CCNC: 59 U/L (ref 40–129)
ALT SERPL-CCNC: 17 U/L (ref 8–55)
AST SERPL-CCNC: 26 U/L (ref 15–37)
BILIRUB DIRECT SERPL-MCNC: 0.2 MG/DL (ref 0–0.3)
BILIRUB SERPL-MCNC: 0.5 MG/DL (ref 0–1.2)
GLOBULIN SER CALC-MCNC: 3.9 G/DL (ref 2.3–3.5)
HCT VFR BLD AUTO: 52.3 % (ref 41.1–50.3)
HGB BLD-MCNC: 16.4 G/DL (ref 13.6–17.2)
PROT SERPL-MCNC: 7.2 G/DL (ref 6.3–8.2)
PSA SERPL-MCNC: 0.9 NG/ML (ref 0–4)

## 2025-09-03 PROCEDURE — 96372 THER/PROPH/DIAG INJ SC/IM: CPT | Performed by: UROLOGY

## 2025-09-03 RX ORDER — TESTOSTERONE CYPIONATE 200 MG/ML
200 INJECTION, SOLUTION INTRAMUSCULAR ONCE
Status: COMPLETED | OUTPATIENT
Start: 2025-09-03 | End: 2025-09-03

## 2025-09-03 RX ADMIN — TESTOSTERONE CYPIONATE 200 MG: 200 INJECTION, SOLUTION INTRAMUSCULAR at 10:04

## 2025-09-04 LAB — TESTOST SERPL-MCNC: 1038 NG/DL (ref 264–916)
